# Patient Record
Sex: MALE | Race: WHITE | Employment: OTHER | URBAN - METROPOLITAN AREA
[De-identification: names, ages, dates, MRNs, and addresses within clinical notes are randomized per-mention and may not be internally consistent; named-entity substitution may affect disease eponyms.]

---

## 2018-03-12 ENCOUNTER — HOSPITAL ENCOUNTER (EMERGENCY)
Age: 68
Discharge: HOME | End: 2018-03-12
Payer: MEDICARE

## 2018-03-12 VITALS
SYSTOLIC BLOOD PRESSURE: 145 MMHG | OXYGEN SATURATION: 95 % | RESPIRATION RATE: 18 BRPM | HEART RATE: 82 BPM | BODY MASS INDEX: 32.08 KG/M2 | DIASTOLIC BLOOD PRESSURE: 88 MMHG | WEIGHT: 230 LBS | TEMPERATURE: 98.6 F

## 2018-03-12 DIAGNOSIS — R06.00 DYSPNEA, UNSPECIFIED TYPE: ICD-10-CM

## 2018-03-12 DIAGNOSIS — M79.89 LEG SWELLING: Primary | ICD-10-CM

## 2018-03-12 PROCEDURE — 99212 OFFICE O/P EST SF 10 MIN: CPT

## 2018-03-12 ASSESSMENT — PAIN SCALES - GENERAL: PAINLEVEL_OUTOF10: 8

## 2018-03-12 ASSESSMENT — PAIN DESCRIPTION - DESCRIPTORS: DESCRIPTORS: CONSTANT

## 2018-03-12 ASSESSMENT — PAIN DESCRIPTION - LOCATION: LOCATION: ABDOMEN

## 2018-03-12 NOTE — ED PROVIDER NOTES
reports that he does not use drugs. Family History: family history is not on file. The patients home medications have been reviewed. Allergies: Desyrel [trazodone]    -------------------------------------------------- RESULTS -------------------------------------------------  All laboratory and radiology results have been personally reviewed by myself   LABS:  No results found for this visit on 03/12/18. RADIOLOGY:  Interpreted by Radiologist.  No orders to display       ------------------------- NURSING NOTES AND VITALS REVIEWED ---------------------------   The nursing notes within the ED encounter and vital signs as below have been reviewed. BP (!) 145/88   Pulse 82   Temp 98.6 °F (37 °C) (Oral)   Resp 18   Wt 230 lb (104.3 kg)   SpO2 95%   BMI 32.08 kg/m²   Oxygen Saturation Interpretation: Normal      ---------------------------------------------------PHYSICAL EXAM--------------------------------------      Constitutional/General: Alert and oriented x3, well appearing, non toxic in NAD  Head: NC/AT  Eyes: PERRL, EOMI  Mouth: Oropharynx clear, handling secretions, no trismus  Neck: Supple, full ROM, no meningeal signs  Pulmonary: Lungs decreased with bilateral expiratory wheezing. No rales, or rhonchi. Not in respiratory distress  Cardiovascular:  Regular rate and rhythm, no murmurs, gallops, or rubs. 2+ distal pulses  Abdomen: Soft, + BS. No distension. Nontender. No palpable rigidity, rebound or guarding  Extremities: Moves all extremities x 4. Bilateral pitting edema. Warm and well perfused  Skin: warm and dry without rash  Neurologic: GCS 15,  Intact. No focal deficits  Psych: Normal Affect      ------------------------------ ED COURSE/MEDICAL DECISION MAKING----------------------  Medications - No data to display      Medical Decision Making:    Neurologic Luis Fernando pitting edema abdominal bloating and shortness of breath.  Patient was advised that he needed to go to the emergency

## 2018-04-25 ENCOUNTER — HOSPITAL ENCOUNTER (OUTPATIENT)
Age: 68
Setting detail: SPECIMEN
Discharge: HOME | End: 2018-04-25
Payer: MEDICARE

## 2018-04-25 PROCEDURE — 87045 FECES CULTURE AEROBIC BACT: CPT

## 2018-04-25 PROCEDURE — 89055 LEUKOCYTE ASSESSMENT FECAL: CPT

## 2018-04-25 PROCEDURE — G0328 FECAL BLOOD SCRN IMMUNOASSAY: HCPCS

## 2018-04-25 PROCEDURE — 82705 FATS/LIPIDS FECES QUAL: CPT

## 2018-04-25 PROCEDURE — 87329 GIARDIA AG IA: CPT

## 2018-04-26 LAB
GIARDIA ANTIGEN STOOL: NORMAL
OCCULT BLOOD SCREENING: NORMAL
WHITE BLOOD CELLS (WBC), STOOL: NORMAL

## 2018-04-27 LAB — CULTURE, STOOL: NORMAL

## 2018-04-29 LAB
FECAL NEUTRAL FAT: NORMAL
FECAL SPLIT FATS: ABNORMAL

## 2019-03-13 ENCOUNTER — ANESTHESIA EVENT (OUTPATIENT)
Dept: OPERATING ROOM | Age: 69
End: 2019-03-13
Payer: MEDICARE

## 2019-03-14 ENCOUNTER — ANESTHESIA (OUTPATIENT)
Dept: OPERATING ROOM | Age: 69
End: 2019-03-14
Payer: MEDICARE

## 2019-03-14 ENCOUNTER — HOSPITAL ENCOUNTER (OUTPATIENT)
Age: 69
Setting detail: OUTPATIENT SURGERY
Discharge: HOME | End: 2019-03-14
Attending: PODIATRIST | Admitting: PODIATRIST
Payer: MEDICARE

## 2019-03-14 VITALS
SYSTOLIC BLOOD PRESSURE: 92 MMHG | TEMPERATURE: 98.6 F | RESPIRATION RATE: 11 BRPM | DIASTOLIC BLOOD PRESSURE: 58 MMHG | OXYGEN SATURATION: 97 %

## 2019-03-14 VITALS
HEIGHT: 70 IN | DIASTOLIC BLOOD PRESSURE: 71 MMHG | WEIGHT: 228 LBS | TEMPERATURE: 98 F | RESPIRATION RATE: 16 BRPM | SYSTOLIC BLOOD PRESSURE: 137 MMHG | BODY MASS INDEX: 32.64 KG/M2 | OXYGEN SATURATION: 98 % | HEART RATE: 50 BPM

## 2019-03-14 DIAGNOSIS — M20.21 HALLUX RIGIDUS OF RIGHT FOOT: Primary | ICD-10-CM

## 2019-03-14 DIAGNOSIS — R52 PAIN: ICD-10-CM

## 2019-03-14 PROCEDURE — C1776 JOINT DEVICE (IMPLANTABLE): HCPCS | Performed by: PODIATRIST

## 2019-03-14 PROCEDURE — 2709999900 HC NON-CHARGEABLE SUPPLY: Performed by: PODIATRIST

## 2019-03-14 PROCEDURE — 2720000010 HC SURG SUPPLY STERILE: Performed by: PODIATRIST

## 2019-03-14 PROCEDURE — 3600000004 HC SURGERY LEVEL 4 BASE: Performed by: PODIATRIST

## 2019-03-14 PROCEDURE — 7100000011 HC PHASE II RECOVERY - ADDTL 15 MIN: Performed by: PODIATRIST

## 2019-03-14 PROCEDURE — 2500000003 HC RX 250 WO HCPCS: Performed by: NURSE ANESTHETIST, CERTIFIED REGISTERED

## 2019-03-14 PROCEDURE — 3600000014 HC SURGERY LEVEL 4 ADDTL 15MIN: Performed by: PODIATRIST

## 2019-03-14 PROCEDURE — 6360000002 HC RX W HCPCS: Performed by: PODIATRIST

## 2019-03-14 PROCEDURE — 3700000000 HC ANESTHESIA ATTENDED CARE: Performed by: PODIATRIST

## 2019-03-14 PROCEDURE — 2580000003 HC RX 258: Performed by: ANESTHESIOLOGY

## 2019-03-14 PROCEDURE — 2580000003 HC RX 258: Performed by: PODIATRIST

## 2019-03-14 PROCEDURE — 3700000001 HC ADD 15 MINUTES (ANESTHESIA): Performed by: PODIATRIST

## 2019-03-14 PROCEDURE — 2500000003 HC RX 250 WO HCPCS: Performed by: PODIATRIST

## 2019-03-14 PROCEDURE — 6360000002 HC RX W HCPCS: Performed by: NURSE ANESTHETIST, CERTIFIED REGISTERED

## 2019-03-14 PROCEDURE — 7100000010 HC PHASE II RECOVERY - FIRST 15 MIN: Performed by: PODIATRIST

## 2019-03-14 DEVICE — IMPLANTABLE DEVICE
Type: IMPLANTABLE DEVICE | Status: FUNCTIONAL
Brand: LPT

## 2019-03-14 RX ORDER — FENTANYL CITRATE 50 UG/ML
INJECTION, SOLUTION INTRAMUSCULAR; INTRAVENOUS PRN
Status: DISCONTINUED | OUTPATIENT
Start: 2019-03-14 | End: 2019-03-14 | Stop reason: SDUPTHER

## 2019-03-14 RX ORDER — OXYCODONE HYDROCHLORIDE AND ACETAMINOPHEN 5; 325 MG/1; MG/1
1 TABLET ORAL EVERY 4 HOURS PRN
Qty: 40 TABLET | Refills: 0 | Status: SHIPPED | OUTPATIENT
Start: 2019-03-14 | End: 2019-03-21

## 2019-03-14 RX ORDER — SODIUM CHLORIDE, SODIUM LACTATE, POTASSIUM CHLORIDE, CALCIUM CHLORIDE 600; 310; 30; 20 MG/100ML; MG/100ML; MG/100ML; MG/100ML
INJECTION, SOLUTION INTRAVENOUS CONTINUOUS
Status: DISCONTINUED | OUTPATIENT
Start: 2019-03-14 | End: 2019-03-14 | Stop reason: HOSPADM

## 2019-03-14 RX ORDER — PROPOFOL 10 MG/ML
INJECTION, EMULSION INTRAVENOUS CONTINUOUS PRN
Status: DISCONTINUED | OUTPATIENT
Start: 2019-03-14 | End: 2019-03-14 | Stop reason: SDUPTHER

## 2019-03-14 RX ORDER — MIDAZOLAM HYDROCHLORIDE 1 MG/ML
INJECTION INTRAMUSCULAR; INTRAVENOUS PRN
Status: DISCONTINUED | OUTPATIENT
Start: 2019-03-14 | End: 2019-03-14 | Stop reason: SDUPTHER

## 2019-03-14 RX ORDER — LIDOCAINE HYDROCHLORIDE 20 MG/ML
INJECTION, SOLUTION INFILTRATION; PERINEURAL PRN
Status: DISCONTINUED | OUTPATIENT
Start: 2019-03-14 | End: 2019-03-14 | Stop reason: SDUPTHER

## 2019-03-14 RX ADMIN — LIDOCAINE HYDROCHLORIDE 25 MG: 20 INJECTION, SOLUTION INFILTRATION; PERINEURAL at 11:19

## 2019-03-14 RX ADMIN — FENTANYL CITRATE 50 MCG: 50 INJECTION, SOLUTION INTRAMUSCULAR; INTRAVENOUS at 11:19

## 2019-03-14 RX ADMIN — PROPOFOL 125 MCG/KG/MIN: 10 INJECTION, EMULSION INTRAVENOUS at 11:19

## 2019-03-14 RX ADMIN — FENTANYL CITRATE 50 MCG: 50 INJECTION, SOLUTION INTRAMUSCULAR; INTRAVENOUS at 11:22

## 2019-03-14 RX ADMIN — CEFAZOLIN 2 G: 1 INJECTION, POWDER, FOR SOLUTION INTRAMUSCULAR; INTRAVENOUS at 11:12

## 2019-03-14 RX ADMIN — MIDAZOLAM 2 MG: 1 INJECTION INTRAMUSCULAR; INTRAVENOUS at 11:16

## 2019-03-14 RX ADMIN — SODIUM CHLORIDE, POTASSIUM CHLORIDE, SODIUM LACTATE AND CALCIUM CHLORIDE: 600; 310; 30; 20 INJECTION, SOLUTION INTRAVENOUS at 10:50

## 2019-03-14 ASSESSMENT — PULMONARY FUNCTION TESTS
PIF_VALUE: 0

## 2019-03-14 ASSESSMENT — PAIN SCALES - GENERAL
PAINLEVEL_OUTOF10: 0

## 2019-03-14 ASSESSMENT — PAIN - FUNCTIONAL ASSESSMENT: PAIN_FUNCTIONAL_ASSESSMENT: 0-10

## 2022-06-09 NOTE — PROGRESS NOTES
Name_______________________________________Printed:____________________  Date and time of surgery__6/29 0730______________________Arrival Time:_0600_______________   1. The instructions given regarding when and if a patient needs to stop oral intake prior to surgery varies. Follow the specific instructions you were given                  __x_Nothing to eat or to drink after Midnight the night before.                   ____Carbo loading or ERAS instructions will be given to select patients-if you have been given those instructions -please do the following                           The evening before your surgery after dinner before midnight drink 40 ounces of gatorade. If you are diabetic use sugar free. The morning of surgery drink 40 ounces of water. This needs to be finished 3 hours prior to your surgery start time. 2. Take the following pills with a small sip of water on the morning of surgery__ methadone prilosec_________________________________________________                  Do not take blood pressure medications ending in pril or sartan the faiza prior to surgery or the morning of surgery_   3. Aspirin, Ibuprofen, Advil, Naproxen, Vitamin E and other Anti-inflammatory products and supplements should be stopped for 5 -7days before surgery or as directed by your physician. 4. Check with your Doctor regarding stopping Plavix, Coumadin,Eliquis, Lovenox,Effient,Pradaxa,Xarelto, Fragmin or other blood thinners and follow their instructions. 5. Do not smoke, and do not drink any alcoholic beverages 24 hours prior to surgery. This includes NA Beer. Refrain from the usage of any recreational drugs. 6. You may brush your teeth and gargle the morning of surgery. DO NOT SWALLOW WATER   7. You MUST make arrangements for a responsible adult to stay on site while you are here and take you home after your surgery. You will not be allowed to leave alone or drive yourself home.   It is strongly suggested someone stay with you the first 24 hrs. Your surgery will be cancelled if you do not have a ride home. 8. A parent/legal guardian must accompany a child scheduled for surgery and plan to stay at the hospital until the child is discharged. Please do not bring other children with you. 9. Please wear simple, loose fitting clothing to the hospital.  Kimberley Stearns not bring valuables (money, credit cards, checkbooks, etc.) Do not wear any makeup (including no eye makeup) or nail polish on your fingers or toes. 10. DO NOT wear any jewelry or piercings on day of surgery. All body piercing jewelry must be removed. 11. If you have ___dentures, they will be removed before going to the OR; we will provide you a container. If you wear ___contact lenses or ___glasses, they will be removed; please bring a case for them. 12. Please see your family doctor/pediatrician for a history & physical and/or concerning medications. Bring any test results/reports from your physician's office. PCP__________________Phone___________H&P Appt. Date________             13 If you  have a Living Will and Durable Power of  for Healthcare, please bring in a copy. 15. Notify your Surgeon if you develop any illness between now and surgery  time, cough, cold, fever, sore throat, nausea, vomiting, etc.  Please notify your surgeon if you experience dizziness, shortness of breath or blurred vision between now & the time of your surgery             15. DO NOT shave your operative site 96 hours prior to surgery. For face & neck surgery, men may use an electric razor 48 hours prior to surgery. 16. Shower the night before or morning of surgery using an antibacterial soap or as you have been instructed. 17. To provide excellent care visitors will be limited to one in the room at any given time. 18.  Please bring picture ID and insurance card.              19.  Visit our web site for additional information:  [email or web address]              20.During flu season no children under the age of 15 are permitted in the hospital for the safety of all patients. 21. If you take a long acting insulin in the evening only  take half of your usual  dose the night  before your procedure              22. If you use a c-pap please bring DOS if staying overnight,             23.For your convenience [address] [place] [address] has a pharmacy on site to fill your prescriptions. 24. If you use oxygen and have a portable tank please bring it  with you the DOS             25. Bring a complete list of all your medications with name and dose include any supplements. 26. Other__________________________________________   *Please call pre admission testing if you any further questions   [address] Air  [phone]   [address] [place] [address]       VISITOR POLICY(subject to change)    Current policy is 2 visitors per patient. No children. A mask is required. Visiting hours are 8a-8p. Overnight visitors will be at the discretion of the nurse. All above information reviewed with patient in person or by phone. Patient verbalizes understanding. All questions and concerns addressed.                                                                                                  Patient/Rep____________________                                                                                                                   Per phone/pt                 PRE OP INSTRUCTIONS

## 2022-06-09 NOTE — PROGRESS NOTES
Patient called in- I started to go over pre-op instructions and get his medical history. Patient became increasingly agitated and belligerent and accusatory. I asked the patient to please calm down I was only trying to help him get ready for surgery He continued and I told him unless he could speak to me in a civil tone I was going to hang up and contact DR Degroot office.  I contacted Dr Alesha Redding office and let them know what had taken place -she states she will contact DR Castrejon and inform him-I asked her to call me back and let me know how to proceed

## 2022-06-09 NOTE — PROGRESS NOTES
Sent virtual class email to [email or web address]. Asked by Chris Ventura to call patient to answer questions about upcoming surgery. Called and left message to return call.       Left contact information    Doreen Klein  Orthopedic Nurse Navigator   [phone]

## 2022-06-14 NOTE — PROGRESS NOTES
Spoke with patient about upcoming surgery. Discussed virtual video requirement. Patient inquired about ecf placement after surgery. Patient lives in [facility]. Son lives nearby but has a multilevel home and is unable to stay with him. Patient has insurance coverage thru [facility] and [place]. Mr. Deepti Casey, reports no preference to staying in [place] or [facility] but would like to stay in a quality facility. List of local facilities covered by [facility] has been sent to patients email. [email or web address] My contact information given. Jakob Llamas  Orthopedic Nurse Navigator  [phone]  [email or web address]

## 2022-06-20 NOTE — PROGRESS NOTES
Patient sent link to watch Joint Education Class  Emailed joint education materials to patient about medication education, helpful tips, pre-surgical checklist, and pre-op showering instructions. Received an e-mail confirmation of patient viewing video. Pre-test and post-test done. Patient is aware that may contact me for any questions. My work email address and phone number given. Pre-test:5/5  Post-test:5/5    Sent patient answer key and explanation of correct answers. DOS: 6/20  Dr Ruiz Shafer Nurse Navigator  [phone]  [email or web address]

## 2022-06-28 ENCOUNTER — ANESTHESIA EVENT (OUTPATIENT)
Dept: OPERATING ROOM | Age: 72
DRG: 470 | End: 2022-06-28
Payer: MEDICARE

## 2022-06-28 NOTE — PROGRESS NOTES
Resent [facility] medicare insurance list to patient's email per patient request.  [email or web address]

## 2022-06-28 NOTE — PROGRESS NOTES
ORTHOPAEDIC NURSE NAVIGATOR SUMMARY NOTE    Surgery Details  Anticipated Date of Surgery: 7/29/2022  Surgery: right knee  Surgeon:  Daksha Honeycutt,  [place] Pre-Op Education: yes Virtual complete  If pt did not complete either, why not? N/A    PCP  PCP: No primary care provider on file. Phone #: None    Date of PCP Visit for H&P: 4/26 by Dr. Daksha Honeycutt  Any Noted Concerns from PCP prior to surgery:  no  If Yes, what concerns?:    Review of Past Medical History Reveals History of:    Patient Medical Hx:  Past Medical History:   Diagnosis Date    Anxiety     Depression     GERD (gastroesophageal reflux disease)     PTSD (post-traumatic stress disorder)      Patients Surgical Hx:   Past Surgical History:   Procedure Laterality Date    ARTHRODESIS Right 3/14/2019    RIGHT MPJ IMPLANT, RIGHT FOOT performed by Jen Mclain DPM at [address]      x5    KNEE SURGERY Bilateral     x4    OTHER SURGICAL HISTORY Right 03/14/2019    MPJ implant     ROTATOR CUFF REPAIR        Patients Social Hx:  Social History     Tobacco History     Smoking Status  Current Every Day Smoker Smoking Frequency  1 pack/day for 48 years (50 pk yrs) Smoking Tobacco Type  Cigarettes    Smokeless Tobacco Use  Never Used    Tobacco Comment  quit [id number]          Alcohol History     Alcohol Use Status  Yes Comment  social          Drug Use     Drug Use Status  No          Sexual Activity     Sexually Active  Not Asked              Alcohol use:     Alcohol Use:     Frequency of Alcohol Consumption: Not on file    Average Number of Drinks: Not on file    Frequency of Binge Drinking: Not on file     Home Medications:  Prior to Admission medications    Medication Sig Start Date End Date Taking?  Authorizing Provider   diclofenac sodium 1 % GEL Apply 4 g topically 2 times daily as needed     Historical Provider, MD   omeprazole (PRILOSEC) 20 MG delayed release capsule Take 20 mg by mouth Daily     Historical Provider, MD methadone (DOLOPHINE) 10 MG tablet Take 10 mg by mouth daily. Historical Provider, MD   vitamin D (CHOLECALCIFEROL) 1000 units TABS tablet Take 1,000 Units by mouth 3 times daily    Historical Provider, MD   ascorbic acid (VITAMIN C) 500 MG tablet Take 500 mg by mouth daily    Historical Provider, MD        Medication Contract and Consent for Opioid Use Documents Filed      No documents found               Pain Management Program:  yes    Lab Values:   Hgb/Hct:   Hemoglobin (g/dL)   Date Value   07/28/2017 14.0   /  Hematocrit (%)   Date Value   07/28/2017 42.6      HgbA1C:    No results found for: LABA1C   Albumin:    Lab Results   Component Value Date    LABALBU 4.2 07/28/2017      BUN/Cr:   BUN (mg/dL)   Date Value   07/28/2017 11   /  CREATININE (mg/dL)   Date Value   07/28/2017 0.7       Coronary Artery Disease/HTN/CHF History: No      Diabetes History: No   Most Recent HgbA1C:   No results found for: LABA1C     Pulmonary: COPD/emphysema/Asthma  Use of home oxygen: no    Tobacco Use      Smoking status: Current Every Day Smoker        Packs/day: 1.00        Years: 48.00        Pack years: 48        Types: Cigarettes      Smokeless tobacco: Never Used      Tobacco comment: quit [id number]    Referred for Smoking Cessation: No, quit 6 months ago  STOP-BANG SCREEN RISK FACTORS:Age > 48  Male      DVT Risk Stratification:  Unknown   History of Blood Clots:no      BMI Greater than 40 at time of scheduling?: No   BMI:    BMI Readings from Last 1 Encounters:   06/09/22 30.85 kg/m²       Pre-HAB  Prehab Ordered: NA  Attended Pre-Hab Program: NA      Discharge Disposition Information:   Patient insurance on file: Medicare, [facility], [facility]   Anticipated Discharge Disposition:  SNF    Who will be with patient at home following discharge? Son lives near by but unable to care for him. Pt agrees to SAME DAY SURGERY: No   L[facility], desires to go to SNF. No preference for [place] or [place] referrals.     Social Determinants of Health     Tobacco Use:     Smoking Tobacco Use: Not on file    Smokeless Tobacco Use: Not on file   Alcohol Use:     Frequency of Alcohol Consumption: Not on file    Average Number of Drinks: Not on file    Frequency of Binge Drinking: Not on file   Financial Resource Strain:     Difficulty of Paying Living Expenses: Not on file   Food Insecurity:     Worried About Running Out of Food in the Last Year: Not on file    [place]eto of Food in the Last Year: Not on file   Transportation Needs:     Lack of Transportation (Medical): Not on file    Lack of Transportation (Non-Medical): Not on file   Physical Activity:     Days of Exercise per Week: Not on file    Minutes of Exercise per Session: Not on file   Stress:     Feeling of Stress : Not on file   Social Connections:     Frequency of Communication with Friends and Family: Not on file    Frequency of Social Gatherings with Friends and Family: Not on file    Attends Alevism Services: Not on file    Active Member of [address] [place] [address] or Organizations: Not on file    Attends Club or Organization Meetings: Not on file    Marital Status: Not on file   Intimate Partner Violence:     Fear of Current or Ex-Partner: Not on file    Emotionally Abused: Not on file    Physically Abused: Not on file    Sexually Abused: Not on file   Depression:     PHQ-2 Score: Not on file   Housing Stability:     Unable to Pay for Housing in the Last Year: Not on file    Number of [place] in the Last Year: Not on file    Unstable Housing in the Last Year: Not on file          Zaria Mejia RN   6/28/2022  Orthopedic Nurse [address] [place] [address]  [phone]  [email or web address]

## 2022-06-29 ENCOUNTER — APPOINTMENT (OUTPATIENT)
Dept: GENERAL RADIOLOGY | Age: 72
DRG: 470 | End: 2022-06-29
Attending: ORTHOPAEDIC SURGERY
Payer: MEDICARE

## 2022-06-29 ENCOUNTER — ANESTHESIA (OUTPATIENT)
Dept: OPERATING ROOM | Age: 72
DRG: 470 | End: 2022-06-29
Payer: MEDICARE

## 2022-06-29 ENCOUNTER — HOSPITAL ENCOUNTER (INPATIENT)
Age: 72
LOS: 2 days | Discharge: NURSING, REHAB OR LONG-TERM CARE | DRG: 470 | End: 2022-07-01
Attending: ORTHOPAEDIC SURGERY | Admitting: ORTHOPAEDIC SURGERY
Payer: MEDICARE

## 2022-06-29 PROBLEM — M54.9 CHRONIC BACK PAIN: Status: ACTIVE | Noted: 2022-06-29

## 2022-06-29 PROBLEM — F43.10 POST TRAUMATIC STRESS DISORDER (PTSD): Status: ACTIVE | Noted: 2017-04-06

## 2022-06-29 PROBLEM — E66.3 OVERWEIGHT: Status: ACTIVE | Noted: 2022-06-29

## 2022-06-29 PROBLEM — D48.5 NEOPLASM OF UNCERTAIN BEHAVIOR OF SKIN: Status: ACTIVE | Noted: 2022-06-29

## 2022-06-29 PROBLEM — S90.129A CONTUSION OF TOE: Status: ACTIVE | Noted: 2022-06-29

## 2022-06-29 PROBLEM — R31.9 HEMATURIA SYNDROME: Status: ACTIVE | Noted: 2022-06-29

## 2022-06-29 PROBLEM — R49.0 DYSPHONIA: Status: ACTIVE | Noted: 2022-06-29

## 2022-06-29 PROBLEM — E55.9 VITAMIN D DEFICIENCY: Status: ACTIVE | Noted: 2022-06-29

## 2022-06-29 PROBLEM — R10.13 EPIGASTRIC PAIN: Status: ACTIVE | Noted: 2022-06-29

## 2022-06-29 PROBLEM — J44.9 CHRONIC OBSTRUCTIVE PULMONARY DISEASE (HCC): Status: ACTIVE | Noted: 2022-06-29

## 2022-06-29 PROBLEM — M25.519 ARTHRALGIA OF SHOULDER: Status: ACTIVE | Noted: 2022-06-29

## 2022-06-29 PROBLEM — R06.00 DYSPNEA, UNSPECIFIED: Status: ACTIVE | Noted: 2022-06-29

## 2022-06-29 PROBLEM — K02.7 DENTAL ROOT CARIES: Status: ACTIVE | Noted: 2022-06-29

## 2022-06-29 PROBLEM — M17.11 PRIMARY OSTEOARTHRITIS OF RIGHT KNEE: Status: ACTIVE | Noted: 2022-06-29

## 2022-06-29 PROBLEM — K59.03 THERAPEUTIC OPIOID INDUCED CONSTIPATION: Status: ACTIVE | Noted: 2022-06-29

## 2022-06-29 PROBLEM — T40.2X5A THERAPEUTIC OPIOID INDUCED CONSTIPATION: Status: ACTIVE | Noted: 2022-06-29

## 2022-06-29 PROBLEM — K02.53 DENTAL CARIES ON PIT AND FISSURE SURFACE PENETRATING INTO PULP: Status: ACTIVE | Noted: 2022-06-29

## 2022-06-29 PROBLEM — F11.20 OPIOID DEPENDENCE (HCC): Status: ACTIVE | Noted: 2022-06-29

## 2022-06-29 PROBLEM — F60.3 BORDERLINE PERSONALITY DISORDER (HCC): Status: ACTIVE | Noted: 2022-06-29

## 2022-06-29 PROBLEM — F17.200 NICOTINE DEPENDENCE: Status: ACTIVE | Noted: 2022-06-29

## 2022-06-29 PROBLEM — M17.10 PRIMARY LOCALIZED OSTEOARTHROSIS, LOWER LEG: Status: ACTIVE | Noted: 2022-06-29

## 2022-06-29 PROBLEM — R52 PAIN, UNSPECIFIED: Status: ACTIVE | Noted: 2022-06-29

## 2022-06-29 PROBLEM — F31.60 BIPOLAR AFFECTIVE DISORDER, CURRENT EPISODE MIXED (HCC): Status: ACTIVE | Noted: 2022-06-29

## 2022-06-29 PROBLEM — I25.9 CHRONIC ISCHEMIC HEART DISEASE: Status: ACTIVE | Noted: 2022-06-29

## 2022-06-29 PROBLEM — M62.81 MUSCLE WEAKNESS (GENERALIZED): Status: ACTIVE | Noted: 2022-06-29

## 2022-06-29 PROBLEM — M75.100 ROTATOR CUFF SYNDROME: Status: ACTIVE | Noted: 2022-06-29

## 2022-06-29 PROBLEM — K21.9 GASTROESOPHAGEAL REFLUX DISEASE: Status: ACTIVE | Noted: 2022-06-29

## 2022-06-29 PROBLEM — R45.851 SUICIDAL THOUGHTS: Status: ACTIVE | Noted: 2022-06-29

## 2022-06-29 PROBLEM — K58.9 IRRITABLE BOWEL SYNDROME: Status: ACTIVE | Noted: 2022-06-29

## 2022-06-29 PROBLEM — F10.11 NONDEPENDENT ALCOHOL ABUSE, IN REMISSION: Status: ACTIVE | Noted: 2022-06-29

## 2022-06-29 PROBLEM — F33.1 MODERATE EPISODE OF RECURRENT MAJOR DEPRESSIVE DISORDER (HCC): Status: ACTIVE | Noted: 2022-06-29

## 2022-06-29 PROBLEM — M54.50 LOW BACK PAIN: Status: ACTIVE | Noted: 2022-06-29

## 2022-06-29 PROBLEM — G89.29 CHRONIC BACK PAIN: Status: ACTIVE | Noted: 2022-06-29

## 2022-06-29 PROBLEM — M25.569 PAIN IN UNSPECIFIED KNEE: Status: ACTIVE | Noted: 2022-06-29

## 2022-06-29 PROBLEM — F33.9 RECURRENT MAJOR DEPRESSIVE DISORDER (HCC): Status: ACTIVE | Noted: 2022-06-29

## 2022-06-29 PROBLEM — M15.9 GENERALIZED OSTEOARTHRITIS: Status: ACTIVE | Noted: 2022-06-29

## 2022-06-29 PROBLEM — F17.200 TOBACCO USE DISORDER: Status: ACTIVE | Noted: 2022-06-29

## 2022-06-29 PROBLEM — M54.2 NECK PAIN: Status: ACTIVE | Noted: 2022-06-29

## 2022-06-29 PROBLEM — E78.5 OTHER AND UNSPECIFIED HYPERLIPIDEMIA: Status: ACTIVE | Noted: 2022-06-29

## 2022-06-29 PROBLEM — G56.01 CARPAL TUNNEL SYNDROME, RIGHT UPPER LIMB: Status: ACTIVE | Noted: 2022-06-29

## 2022-06-29 PROBLEM — Z59.00 HOMELESS SINGLE PERSON: Status: ACTIVE | Noted: 2022-06-29

## 2022-06-29 PROBLEM — L25.9 CONTACT DERMATITIS AND OTHER ECZEMA: Status: ACTIVE | Noted: 2022-06-29

## 2022-06-29 PROBLEM — K02.63 DENTAL CARIES ON SMOOTH SURFACE PENETRATING INTO PULP: Status: ACTIVE | Noted: 2022-06-29

## 2022-06-29 PROBLEM — S06.9XAA INTRACRANIAL INJURY OF OTHER AND UNSPECIFIED NATURE: Status: ACTIVE | Noted: 2022-06-29

## 2022-06-29 PROBLEM — H91.8X9 OTHER SPECIFIED FORMS OF HEARING LOSS: Status: ACTIVE | Noted: 2022-06-29

## 2022-06-29 PROBLEM — K80.20 CHOLELITHIASIS: Status: ACTIVE | Noted: 2022-06-29

## 2022-06-29 PROBLEM — F13.20 SEDATIVE HYPNOTIC OR ANXIOLYTIC DEPENDENCE (HCC): Status: ACTIVE | Noted: 2022-06-29

## 2022-06-29 PROBLEM — R94.31 PROLONGED QT INTERVAL: Status: ACTIVE | Noted: 2022-06-29

## 2022-06-29 PROBLEM — H90.3 SENSORINEURAL HEARING LOSS, BILATERAL: Status: ACTIVE | Noted: 2022-06-29

## 2022-06-29 PROBLEM — M17.0 PRIMARY OSTEOARTHRITIS OF BOTH KNEES: Status: ACTIVE | Noted: 2017-04-06

## 2022-06-29 PROBLEM — R69 OTHER ILL-DEFINED AND UNKNOWN CAUSES OF MORBIDITY AND MORTALITY: Status: ACTIVE | Noted: 2022-06-29

## 2022-06-29 PROBLEM — N20.0 CALCULUS OF KIDNEY: Status: ACTIVE | Noted: 2022-06-29

## 2022-06-29 PROBLEM — F43.10 POSTTRAUMATIC STRESS DISORDER: Status: ACTIVE | Noted: 2022-06-29

## 2022-06-29 PROBLEM — I25.10 ARTERIOSCLEROSIS OF CORONARY ARTERY: Status: ACTIVE | Noted: 2022-06-29

## 2022-06-29 PROBLEM — F41.8 DEPRESSION WITH ANXIETY: Status: ACTIVE | Noted: 2022-06-29

## 2022-06-29 PROBLEM — J18.9 PNEUMONIA, UNSPECIFIED ORGANISM: Status: ACTIVE | Noted: 2022-06-29

## 2022-06-29 LAB
ABO/RH: NORMAL
ABO/RH: NORMAL
ANTIBODY SCREEN: NORMAL
GLUCOSE BLD-MCNC: 103 MG/DL (ref 70–99)
GLUCOSE BLD-MCNC: 135 MG/DL (ref 70–99)
PERFORMED ON: ABNORMAL
PERFORMED ON: ABNORMAL

## 2022-06-29 PROCEDURE — 2500000003 HC RX 250 WO HCPCS: Performed by: NURSE ANESTHETIST, CERTIFIED REGISTERED

## 2022-06-29 PROCEDURE — 2500000003 HC RX 250 WO HCPCS: Performed by: ANESTHESIOLOGY

## 2022-06-29 PROCEDURE — 0SRC0J9 REPLACEMENT OF RIGHT KNEE JOINT WITH SYNTHETIC SUBSTITUTE, CEMENTED, OPEN APPROACH: ICD-10-PCS | Performed by: ORTHOPAEDIC SURGERY

## 2022-06-29 PROCEDURE — 6360000002 HC RX W HCPCS: Performed by: ORTHOPAEDIC SURGERY

## 2022-06-29 PROCEDURE — 2580000003 HC RX 258: Performed by: ORTHOPAEDIC SURGERY

## 2022-06-29 PROCEDURE — 97165 OT EVAL LOW COMPLEX 30 MIN: CPT

## 2022-06-29 PROCEDURE — 97161 PT EVAL LOW COMPLEX 20 MIN: CPT

## 2022-06-29 PROCEDURE — 76942 ECHO GUIDE FOR BIOPSY: CPT | Performed by: ANESTHESIOLOGY

## 2022-06-29 PROCEDURE — C1776 JOINT DEVICE (IMPLANTABLE): HCPCS | Performed by: ORTHOPAEDIC SURGERY

## 2022-06-29 PROCEDURE — 6370000000 HC RX 637 (ALT 250 FOR IP): Performed by: ORTHOPAEDIC SURGERY

## 2022-06-29 PROCEDURE — 6370000000 HC RX 637 (ALT 250 FOR IP): Performed by: ANESTHESIOLOGY

## 2022-06-29 PROCEDURE — 7100000000 HC PACU RECOVERY - FIRST 15 MIN: Performed by: ORTHOPAEDIC SURGERY

## 2022-06-29 PROCEDURE — 3700000001 HC ADD 15 MINUTES (ANESTHESIA): Performed by: ORTHOPAEDIC SURGERY

## 2022-06-29 PROCEDURE — 2720000010 HC SURG SUPPLY STERILE: Performed by: ORTHOPAEDIC SURGERY

## 2022-06-29 PROCEDURE — 3600000005 HC SURGERY LEVEL 5 BASE: Performed by: ORTHOPAEDIC SURGERY

## 2022-06-29 PROCEDURE — 86900 BLOOD TYPING SEROLOGIC ABO: CPT

## 2022-06-29 PROCEDURE — 73560 X-RAY EXAM OF KNEE 1 OR 2: CPT

## 2022-06-29 PROCEDURE — C1713 ANCHOR/SCREW BN/BN,TIS/BN: HCPCS | Performed by: ORTHOPAEDIC SURGERY

## 2022-06-29 PROCEDURE — 7100000001 HC PACU RECOVERY - ADDTL 15 MIN: Performed by: ORTHOPAEDIC SURGERY

## 2022-06-29 PROCEDURE — 8E0Y0CZ ROBOTIC ASSISTED PROCEDURE OF LOWER EXTREMITY, OPEN APPROACH: ICD-10-PCS | Performed by: ORTHOPAEDIC SURGERY

## 2022-06-29 PROCEDURE — 3700000000 HC ANESTHESIA ATTENDED CARE: Performed by: ORTHOPAEDIC SURGERY

## 2022-06-29 PROCEDURE — 6360000002 HC RX W HCPCS: Performed by: ANESTHESIOLOGY

## 2022-06-29 PROCEDURE — 2500000003 HC RX 250 WO HCPCS: Performed by: ORTHOPAEDIC SURGERY

## 2022-06-29 PROCEDURE — 94150 VITAL CAPACITY TEST: CPT

## 2022-06-29 PROCEDURE — 6360000002 HC RX W HCPCS: Performed by: NURSE ANESTHETIST, CERTIFIED REGISTERED

## 2022-06-29 PROCEDURE — 3600000015 HC SURGERY LEVEL 5 ADDTL 15MIN: Performed by: ORTHOPAEDIC SURGERY

## 2022-06-29 PROCEDURE — A4217 STERILE WATER/SALINE, 500 ML: HCPCS | Performed by: ORTHOPAEDIC SURGERY

## 2022-06-29 PROCEDURE — 2709999900 HC NON-CHARGEABLE SUPPLY: Performed by: ORTHOPAEDIC SURGERY

## 2022-06-29 PROCEDURE — 86850 RBC ANTIBODY SCREEN: CPT

## 2022-06-29 PROCEDURE — 97530 THERAPEUTIC ACTIVITIES: CPT

## 2022-06-29 PROCEDURE — 64447 NJX AA&/STRD FEMORAL NRV IMG: CPT | Performed by: ANESTHESIOLOGY

## 2022-06-29 PROCEDURE — 86901 BLOOD TYPING SEROLOGIC RH(D): CPT

## 2022-06-29 PROCEDURE — 1200000000 HC SEMI PRIVATE

## 2022-06-29 DEVICE — CEMENT BNE 40GM HI VISC RADPQ FOR REV SURG: Type: IMPLANTABLE DEVICE | Site: KNEE | Status: FUNCTIONAL

## 2022-06-29 DEVICE — JOURNEY II BCS FEMORAL OXINIUM                                    RIGHT SIZE 8
Type: IMPLANTABLE DEVICE | Site: KNEE | Status: FUNCTIONAL
Brand: JOURNEY

## 2022-06-29 DEVICE — JOURNEY TIBIAL BASEPLATE NONPOROUS                                    RIGHT SIZE 7
Type: IMPLANTABLE DEVICE | Site: KNEE | Status: FUNCTIONAL
Brand: JOURNEY

## 2022-06-29 DEVICE — JOURNEY II BCS XLPE ARTICULAR                                    INSERT SIZE 7-8 RIGHT 9MM
Type: IMPLANTABLE DEVICE | Site: KNEE | Status: FUNCTIONAL
Brand: JOURNEY

## 2022-06-29 DEVICE — JOURNEY BCS PATELLA  RESURFACING                                    ROUND 35 MM  STD
Type: IMPLANTABLE DEVICE | Site: PATELLA | Status: FUNCTIONAL
Brand: JOURNEY

## 2022-06-29 RX ORDER — SODIUM CHLORIDE 9 MG/ML
INJECTION, SOLUTION INTRAVENOUS PRN
Status: DISCONTINUED | OUTPATIENT
Start: 2022-06-29 | End: 2022-06-29

## 2022-06-29 RX ORDER — MEPERIDINE HYDROCHLORIDE 25 MG/ML
12.5 INJECTION INTRAMUSCULAR; INTRAVENOUS; SUBCUTANEOUS EVERY 5 MIN PRN
Status: DISCONTINUED | OUTPATIENT
Start: 2022-06-29 | End: 2022-07-01 | Stop reason: HOSPADM

## 2022-06-29 RX ORDER — SODIUM CHLORIDE, SODIUM LACTATE, POTASSIUM CHLORIDE, CALCIUM CHLORIDE 600; 310; 30; 20 MG/100ML; MG/100ML; MG/100ML; MG/100ML
INJECTION, SOLUTION INTRAVENOUS CONTINUOUS
Status: DISCONTINUED | OUTPATIENT
Start: 2022-06-29 | End: 2022-06-29

## 2022-06-29 RX ORDER — VITAMIN B COMPLEX
1000 TABLET ORAL 3 TIMES DAILY
Status: DISCONTINUED | OUTPATIENT
Start: 2022-06-29 | End: 2022-07-01 | Stop reason: HOSPADM

## 2022-06-29 RX ORDER — HYDROMORPHONE HCL 110MG/55ML
PATIENT CONTROLLED ANALGESIA SYRINGE INTRAVENOUS PRN
Status: DISCONTINUED | OUTPATIENT
Start: 2022-06-29 | End: 2022-06-29 | Stop reason: SDUPTHER

## 2022-06-29 RX ORDER — ACETAMINOPHEN 325 MG/1
650 TABLET ORAL EVERY 6 HOURS SCHEDULED
Status: DISCONTINUED | OUTPATIENT
Start: 2022-06-29 | End: 2022-07-01 | Stop reason: HOSPADM

## 2022-06-29 RX ORDER — BUPIVACAINE HYDROCHLORIDE AND EPINEPHRINE 2.5; 5 MG/ML; UG/ML
INJECTION, SOLUTION EPIDURAL; INFILTRATION; INTRACAUDAL; PERINEURAL
Status: COMPLETED | OUTPATIENT
Start: 2022-06-29 | End: 2022-06-29

## 2022-06-29 RX ORDER — DEXMEDETOMIDINE HYDROCHLORIDE 100 UG/ML
INJECTION, SOLUTION INTRAVENOUS PRN
Status: DISCONTINUED | OUTPATIENT
Start: 2022-06-29 | End: 2022-06-29 | Stop reason: SDUPTHER

## 2022-06-29 RX ORDER — BUPIVACAINE HYDROCHLORIDE 2.5 MG/ML
INJECTION, SOLUTION INFILTRATION; PERINEURAL
Status: COMPLETED | OUTPATIENT
Start: 2022-06-29 | End: 2022-06-29

## 2022-06-29 RX ORDER — ONDANSETRON 2 MG/ML
4 INJECTION INTRAMUSCULAR; INTRAVENOUS
Status: ACTIVE | OUTPATIENT
Start: 2022-06-29 | End: 2022-06-29

## 2022-06-29 RX ORDER — LIDOCAINE HYDROCHLORIDE 20 MG/ML
INJECTION, SOLUTION EPIDURAL; INFILTRATION; INTRACAUDAL; PERINEURAL PRN
Status: DISCONTINUED | OUTPATIENT
Start: 2022-06-29 | End: 2022-06-29 | Stop reason: SDUPTHER

## 2022-06-29 RX ORDER — KETAMINE HCL IN NACL, ISO-OSM 100MG/10ML
SYRINGE (ML) INJECTION PRN
Status: DISCONTINUED | OUTPATIENT
Start: 2022-06-29 | End: 2022-06-29 | Stop reason: SDUPTHER

## 2022-06-29 RX ORDER — SODIUM PHOSPHATE, DIBASIC AND SODIUM PHOSPHATE, MONOBASIC 7; 19 G/133ML; G/133ML
1 ENEMA RECTAL DAILY PRN
Status: DISCONTINUED | OUTPATIENT
Start: 2022-06-29 | End: 2022-07-01 | Stop reason: HOSPADM

## 2022-06-29 RX ORDER — SUCCINYLCHOLINE CHLORIDE 20 MG/ML
INJECTION INTRAMUSCULAR; INTRAVENOUS PRN
Status: DISCONTINUED | OUTPATIENT
Start: 2022-06-29 | End: 2022-06-29 | Stop reason: SDUPTHER

## 2022-06-29 RX ORDER — SODIUM CHLORIDE 0.9 % (FLUSH) 0.9 %
5-40 SYRINGE (ML) INJECTION EVERY 12 HOURS SCHEDULED
Status: DISCONTINUED | OUTPATIENT
Start: 2022-06-29 | End: 2022-07-01 | Stop reason: HOSPADM

## 2022-06-29 RX ORDER — ONDANSETRON 2 MG/ML
4 INJECTION INTRAMUSCULAR; INTRAVENOUS EVERY 6 HOURS PRN
Status: DISCONTINUED | OUTPATIENT
Start: 2022-06-29 | End: 2022-07-01 | Stop reason: HOSPADM

## 2022-06-29 RX ORDER — OMEPRAZOLE 10 MG/1
20 CAPSULE, DELAYED RELEASE ORAL DAILY
Status: DISCONTINUED | OUTPATIENT
Start: 2022-06-29 | End: 2022-06-29 | Stop reason: CLARIF

## 2022-06-29 RX ORDER — HYDROMORPHONE HYDROCHLORIDE 1 MG/ML
0.5 INJECTION, SOLUTION INTRAMUSCULAR; INTRAVENOUS; SUBCUTANEOUS
Status: DISCONTINUED | OUTPATIENT
Start: 2022-06-29 | End: 2022-07-01 | Stop reason: HOSPADM

## 2022-06-29 RX ORDER — HYDRALAZINE HYDROCHLORIDE 20 MG/ML
10 INJECTION INTRAMUSCULAR; INTRAVENOUS
Status: DISCONTINUED | OUTPATIENT
Start: 2022-06-29 | End: 2022-07-01 | Stop reason: HOSPADM

## 2022-06-29 RX ORDER — SODIUM CHLORIDE 0.9 % (FLUSH) 0.9 %
5-40 SYRINGE (ML) INJECTION EVERY 12 HOURS SCHEDULED
Status: DISCONTINUED | OUTPATIENT
Start: 2022-06-29 | End: 2022-06-29

## 2022-06-29 RX ORDER — SODIUM CHLORIDE 0.9 % (FLUSH) 0.9 %
5-40 SYRINGE (ML) INJECTION PRN
Status: DISCONTINUED | OUTPATIENT
Start: 2022-06-29 | End: 2022-06-29

## 2022-06-29 RX ORDER — LIDOCAINE HYDROCHLORIDE 10 MG/ML
1 INJECTION, SOLUTION EPIDURAL; INFILTRATION; INTRACAUDAL; PERINEURAL
Status: DISCONTINUED | OUTPATIENT
Start: 2022-06-29 | End: 2022-06-29

## 2022-06-29 RX ORDER — HYDROMORPHONE HYDROCHLORIDE 2 MG/1
1 TABLET ORAL EVERY 4 HOURS PRN
Status: DISCONTINUED | OUTPATIENT
Start: 2022-06-29 | End: 2022-07-01 | Stop reason: HOSPADM

## 2022-06-29 RX ORDER — HYDROMORPHONE HYDROCHLORIDE 1 MG/ML
0.25 INJECTION, SOLUTION INTRAMUSCULAR; INTRAVENOUS; SUBCUTANEOUS
Status: DISCONTINUED | OUTPATIENT
Start: 2022-06-29 | End: 2022-07-01 | Stop reason: HOSPADM

## 2022-06-29 RX ORDER — DEXAMETHASONE SODIUM PHOSPHATE 4 MG/ML
INJECTION, SOLUTION INTRA-ARTICULAR; INTRALESIONAL; INTRAMUSCULAR; INTRAVENOUS; SOFT TISSUE PRN
Status: DISCONTINUED | OUTPATIENT
Start: 2022-06-29 | End: 2022-06-29 | Stop reason: SDUPTHER

## 2022-06-29 RX ORDER — CELECOXIB 200 MG/1
400 CAPSULE ORAL ONCE
Status: COMPLETED | OUTPATIENT
Start: 2022-06-29 | End: 2022-06-29

## 2022-06-29 RX ORDER — ROCURONIUM BROMIDE 10 MG/ML
INJECTION, SOLUTION INTRAVENOUS PRN
Status: DISCONTINUED | OUTPATIENT
Start: 2022-06-29 | End: 2022-06-29 | Stop reason: SDUPTHER

## 2022-06-29 RX ORDER — MAGNESIUM SULFATE HEPTAHYDRATE 500 MG/ML
INJECTION, SOLUTION INTRAMUSCULAR; INTRAVENOUS PRN
Status: DISCONTINUED | OUTPATIENT
Start: 2022-06-29 | End: 2022-06-29 | Stop reason: SDUPTHER

## 2022-06-29 RX ORDER — LIDOCAINE HYDROCHLORIDE 10 MG/ML
0.5 INJECTION, SOLUTION EPIDURAL; INFILTRATION; INTRACAUDAL; PERINEURAL ONCE
Status: DISCONTINUED | OUTPATIENT
Start: 2022-06-29 | End: 2022-06-29

## 2022-06-29 RX ORDER — BUPIVACAINE HYDROCHLORIDE 5 MG/ML
INJECTION, SOLUTION EPIDURAL; INTRACAUDAL
Status: COMPLETED | OUTPATIENT
Start: 2022-06-29 | End: 2022-06-29

## 2022-06-29 RX ORDER — FENTANYL CITRATE 50 UG/ML
INJECTION, SOLUTION INTRAMUSCULAR; INTRAVENOUS PRN
Status: DISCONTINUED | OUTPATIENT
Start: 2022-06-29 | End: 2022-06-29 | Stop reason: SDUPTHER

## 2022-06-29 RX ORDER — EPHEDRINE SULFATE/0.9% NACL/PF 50 MG/5 ML
SYRINGE (ML) INTRAVENOUS PRN
Status: DISCONTINUED | OUTPATIENT
Start: 2022-06-29 | End: 2022-06-29 | Stop reason: SDUPTHER

## 2022-06-29 RX ORDER — HYDROMORPHONE HYDROCHLORIDE 2 MG/1
2 TABLET ORAL EVERY 4 HOURS PRN
Status: DISCONTINUED | OUTPATIENT
Start: 2022-06-29 | End: 2022-07-01 | Stop reason: HOSPADM

## 2022-06-29 RX ORDER — MIDAZOLAM HYDROCHLORIDE 1 MG/ML
INJECTION INTRAMUSCULAR; INTRAVENOUS PRN
Status: DISCONTINUED | OUTPATIENT
Start: 2022-06-29 | End: 2022-06-29 | Stop reason: SDUPTHER

## 2022-06-29 RX ORDER — PANTOPRAZOLE SODIUM 20 MG/1
20 TABLET, DELAYED RELEASE ORAL DAILY
COMMUNITY

## 2022-06-29 RX ORDER — PROPOFOL 10 MG/ML
INJECTION, EMULSION INTRAVENOUS PRN
Status: DISCONTINUED | OUTPATIENT
Start: 2022-06-29 | End: 2022-06-29 | Stop reason: SDUPTHER

## 2022-06-29 RX ORDER — METHADONE HYDROCHLORIDE 10 MG/1
20 TABLET ORAL DAILY
Status: DISCONTINUED | OUTPATIENT
Start: 2022-06-29 | End: 2022-07-01 | Stop reason: HOSPADM

## 2022-06-29 RX ORDER — ENOXAPARIN SODIUM 100 MG/ML
40 INJECTION SUBCUTANEOUS DAILY
Status: DISCONTINUED | OUTPATIENT
Start: 2022-06-30 | End: 2022-07-01 | Stop reason: HOSPADM

## 2022-06-29 RX ORDER — MELOXICAM 7.5 MG/1
3.75 TABLET ORAL DAILY
Status: DISCONTINUED | OUTPATIENT
Start: 2022-06-29 | End: 2022-06-30

## 2022-06-29 RX ORDER — SODIUM CHLORIDE 0.9 % (FLUSH) 0.9 %
5-40 SYRINGE (ML) INJECTION PRN
Status: DISCONTINUED | OUTPATIENT
Start: 2022-06-29 | End: 2022-07-01 | Stop reason: HOSPADM

## 2022-06-29 RX ORDER — HYDROMORPHONE HCL 110MG/55ML
0.5 PATIENT CONTROLLED ANALGESIA SYRINGE INTRAVENOUS EVERY 5 MIN PRN
Status: DISCONTINUED | OUTPATIENT
Start: 2022-06-29 | End: 2022-07-01 | Stop reason: HOSPADM

## 2022-06-29 RX ORDER — CELECOXIB 200 MG/1
200 CAPSULE ORAL ONCE
Status: DISCONTINUED | OUTPATIENT
Start: 2022-06-29 | End: 2022-06-29

## 2022-06-29 RX ORDER — ONDANSETRON 4 MG/1
4 TABLET, ORALLY DISINTEGRATING ORAL EVERY 8 HOURS PRN
Status: DISCONTINUED | OUTPATIENT
Start: 2022-06-29 | End: 2022-07-01 | Stop reason: HOSPADM

## 2022-06-29 RX ORDER — ACETAMINOPHEN 325 MG/1
650 TABLET ORAL ONCE
Status: COMPLETED | OUTPATIENT
Start: 2022-06-29 | End: 2022-06-29

## 2022-06-29 RX ORDER — SODIUM CHLORIDE 9 MG/ML
INJECTION, SOLUTION INTRAVENOUS PRN
Status: DISCONTINUED | OUTPATIENT
Start: 2022-06-29 | End: 2022-07-01 | Stop reason: HOSPADM

## 2022-06-29 RX ORDER — ONDANSETRON 2 MG/ML
INJECTION INTRAMUSCULAR; INTRAVENOUS PRN
Status: DISCONTINUED | OUTPATIENT
Start: 2022-06-29 | End: 2022-06-29 | Stop reason: SDUPTHER

## 2022-06-29 RX ORDER — PANTOPRAZOLE SODIUM 40 MG/1
40 TABLET, DELAYED RELEASE ORAL
Status: DISCONTINUED | OUTPATIENT
Start: 2022-06-30 | End: 2022-07-01 | Stop reason: HOSPADM

## 2022-06-29 RX ORDER — LABETALOL HYDROCHLORIDE 5 MG/ML
10 INJECTION, SOLUTION INTRAVENOUS
Status: DISCONTINUED | OUTPATIENT
Start: 2022-06-29 | End: 2022-07-01 | Stop reason: HOSPADM

## 2022-06-29 RX ORDER — ASCORBIC ACID 500 MG
500 TABLET ORAL DAILY
Status: DISCONTINUED | OUTPATIENT
Start: 2022-06-30 | End: 2022-07-01 | Stop reason: HOSPADM

## 2022-06-29 RX ORDER — HYDROXYZINE HYDROCHLORIDE 10 MG/1
10 TABLET, FILM COATED ORAL EVERY 8 HOURS PRN
Status: DISCONTINUED | OUTPATIENT
Start: 2022-06-29 | End: 2022-07-01 | Stop reason: HOSPADM

## 2022-06-29 RX ORDER — OXYCODONE HYDROCHLORIDE 5 MG/1
5 TABLET ORAL
Status: COMPLETED | OUTPATIENT
Start: 2022-06-29 | End: 2022-06-29

## 2022-06-29 RX ORDER — SODIUM CHLORIDE 9 MG/ML
INJECTION, SOLUTION INTRAVENOUS CONTINUOUS
Status: DISCONTINUED | OUTPATIENT
Start: 2022-06-29 | End: 2022-06-30

## 2022-06-29 RX ADMIN — SODIUM CHLORIDE: 9 INJECTION, SOLUTION INTRAVENOUS at 13:04

## 2022-06-29 RX ADMIN — HYDROMORPHONE HYDROCHLORIDE 0.2 MG: 2 INJECTION, SOLUTION INTRAMUSCULAR; INTRAVENOUS; SUBCUTANEOUS at 09:31

## 2022-06-29 RX ADMIN — TRANEXAMIC ACID 1000 MG: 1 INJECTION, SOLUTION INTRAVENOUS at 07:15

## 2022-06-29 RX ADMIN — Medication 10 MG: at 07:54

## 2022-06-29 RX ADMIN — DEXAMETHASONE SODIUM PHOSPHATE 10 MG: 4 INJECTION, SOLUTION INTRAMUSCULAR; INTRAVENOUS at 07:41

## 2022-06-29 RX ADMIN — ACETAMINOPHEN 650 MG: 325 TABLET ORAL at 17:26

## 2022-06-29 RX ADMIN — FENTANYL CITRATE 50 MCG: 50 INJECTION, SOLUTION INTRAMUSCULAR; INTRAVENOUS at 07:34

## 2022-06-29 RX ADMIN — DEXMEDETOMIDINE HYDROCHLORIDE 5 MCG: 100 INJECTION, SOLUTION INTRAVENOUS at 10:02

## 2022-06-29 RX ADMIN — SODIUM CHLORIDE: 9 INJECTION, SOLUTION INTRAVENOUS at 21:09

## 2022-06-29 RX ADMIN — OXYCODONE 5 MG: 5 TABLET ORAL at 11:09

## 2022-06-29 RX ADMIN — HYDROMORPHONE HYDROCHLORIDE 0.5 MG: 2 INJECTION, SOLUTION INTRAMUSCULAR; INTRAVENOUS; SUBCUTANEOUS at 10:52

## 2022-06-29 RX ADMIN — ROCURONIUM BROMIDE 10 MG: 10 INJECTION, SOLUTION INTRAVENOUS at 07:37

## 2022-06-29 RX ADMIN — CEFAZOLIN 2000 MG: 2 INJECTION, POWDER, FOR SOLUTION INTRAMUSCULAR; INTRAVENOUS at 23:09

## 2022-06-29 RX ADMIN — PROPOFOL 200 MG: 10 INJECTION, EMULSION INTRAVENOUS at 07:37

## 2022-06-29 RX ADMIN — ROCURONIUM BROMIDE 40 MG: 10 INJECTION, SOLUTION INTRAVENOUS at 07:54

## 2022-06-29 RX ADMIN — Medication 10 MG: at 08:47

## 2022-06-29 RX ADMIN — SUGAMMADEX 200 MG: 100 INJECTION, SOLUTION INTRAVENOUS at 10:04

## 2022-06-29 RX ADMIN — SODIUM CHLORIDE, POTASSIUM CHLORIDE, SODIUM LACTATE AND CALCIUM CHLORIDE: 600; 310; 30; 20 INJECTION, SOLUTION INTRAVENOUS at 07:11

## 2022-06-29 RX ADMIN — HYDROMORPHONE HYDROCHLORIDE 0.5 MG: 2 INJECTION, SOLUTION INTRAMUSCULAR; INTRAVENOUS; SUBCUTANEOUS at 17:25

## 2022-06-29 RX ADMIN — ACETAMINOPHEN 650 MG: 325 TABLET ORAL at 12:49

## 2022-06-29 RX ADMIN — FENTANYL CITRATE 50 MCG: 50 INJECTION, SOLUTION INTRAMUSCULAR; INTRAVENOUS at 09:01

## 2022-06-29 RX ADMIN — MAGNESIUM SULFATE HEPTAHYDRATE 1 G: 500 INJECTION, SOLUTION INTRAMUSCULAR; INTRAVENOUS at 07:34

## 2022-06-29 RX ADMIN — Medication 1000 UNITS: at 20:48

## 2022-06-29 RX ADMIN — LIDOCAINE HYDROCHLORIDE 100 MG: 20 INJECTION, SOLUTION EPIDURAL; INFILTRATION; INTRACAUDAL; PERINEURAL at 07:36

## 2022-06-29 RX ADMIN — DEXMEDETOMIDINE HYDROCHLORIDE 5 MCG: 100 INJECTION, SOLUTION INTRAVENOUS at 08:14

## 2022-06-29 RX ADMIN — Medication 1000 UNITS: at 12:49

## 2022-06-29 RX ADMIN — Medication 10 MG: at 07:48

## 2022-06-29 RX ADMIN — ONDANSETRON 4 MG: 2 INJECTION INTRAMUSCULAR; INTRAVENOUS at 07:41

## 2022-06-29 RX ADMIN — BISACODYL 5 MG: 5 TABLET, COATED ORAL at 12:49

## 2022-06-29 RX ADMIN — CEFAZOLIN 2000 MG: 2 INJECTION, POWDER, FOR SOLUTION INTRAMUSCULAR; INTRAVENOUS at 14:27

## 2022-06-29 RX ADMIN — MELOXICAM 3.75 MG: 7.5 TABLET ORAL at 12:49

## 2022-06-29 RX ADMIN — HYDROMORPHONE HYDROCHLORIDE 0.5 MG: 2 INJECTION, SOLUTION INTRAMUSCULAR; INTRAVENOUS; SUBCUTANEOUS at 10:59

## 2022-06-29 RX ADMIN — Medication 30 MG: at 07:46

## 2022-06-29 RX ADMIN — DEXMEDETOMIDINE HYDROCHLORIDE 5 MCG: 100 INJECTION, SOLUTION INTRAVENOUS at 09:09

## 2022-06-29 RX ADMIN — METHADONE HYDROCHLORIDE 20 MG: 10 TABLET ORAL at 16:31

## 2022-06-29 RX ADMIN — Medication 10 MG: at 09:44

## 2022-06-29 RX ADMIN — BUPIVACAINE HYDROCHLORIDE 20 ML: 5 INJECTION, SOLUTION EPIDURAL; INTRACAUDAL at 07:07

## 2022-06-29 RX ADMIN — MIDAZOLAM 2 MG: 1 INJECTION INTRAMUSCULAR; INTRAVENOUS at 07:31

## 2022-06-29 RX ADMIN — HYDROMORPHONE HYDROCHLORIDE 0.2 MG: 2 INJECTION, SOLUTION INTRAMUSCULAR; INTRAVENOUS; SUBCUTANEOUS at 09:37

## 2022-06-29 RX ADMIN — TRANEXAMIC ACID 1000 MG: 1 INJECTION, SOLUTION INTRAVENOUS at 09:49

## 2022-06-29 RX ADMIN — SUCCINYLCHOLINE CHLORIDE 140 MG: 20 INJECTION, SOLUTION INTRAMUSCULAR; INTRAVENOUS at 07:37

## 2022-06-29 RX ADMIN — CEFAZOLIN 2000 MG: 2 INJECTION, POWDER, FOR SOLUTION INTRAMUSCULAR; INTRAVENOUS at 07:29

## 2022-06-29 RX ADMIN — ACETAMINOPHEN 650 MG: 325 TABLET ORAL at 06:54

## 2022-06-29 RX ADMIN — BUPIVACAINE HYDROCHLORIDE 20 ML: 2.5 INJECTION, SOLUTION INFILTRATION; PERINEURAL at 07:04

## 2022-06-29 RX ADMIN — HYDROMORPHONE HYDROCHLORIDE 2 MG: 2 TABLET ORAL at 21:08

## 2022-06-29 RX ADMIN — CELECOXIB 400 MG: 200 CAPSULE ORAL at 06:54

## 2022-06-29 ASSESSMENT — PAIN SCALES - WONG BAKER
WONGBAKER_NUMERICALRESPONSE: 0
WONGBAKER_NUMERICALRESPONSE: 0

## 2022-06-29 ASSESSMENT — PAIN - FUNCTIONAL ASSESSMENT
PAIN_FUNCTIONAL_ASSESSMENT: ACTIVITIES ARE NOT PREVENTED
PAIN_FUNCTIONAL_ASSESSMENT: 0-10

## 2022-06-29 ASSESSMENT — PAIN SCALES - GENERAL
PAINLEVEL_OUTOF10: 7
PAINLEVEL_OUTOF10: 5
PAINLEVEL_OUTOF10: 10
PAINLEVEL_OUTOF10: 8
PAINLEVEL_OUTOF10: 7
PAINLEVEL_OUTOF10: 10
PAINLEVEL_OUTOF10: 8
PAINLEVEL_OUTOF10: 7

## 2022-06-29 ASSESSMENT — PAIN DESCRIPTION - LOCATION
LOCATION: KNEE
LOCATION: KNEE
LOCATION: LEG
LOCATION: KNEE

## 2022-06-29 ASSESSMENT — PAIN DESCRIPTION - ORIENTATION
ORIENTATION: RIGHT

## 2022-06-29 ASSESSMENT — PAIN DESCRIPTION - PAIN TYPE: TYPE: SURGICAL PAIN

## 2022-06-29 ASSESSMENT — PAIN DESCRIPTION - DESCRIPTORS
DESCRIPTORS: PATIENT UNABLE TO DESCRIBE
DESCRIPTORS: ACHING
DESCRIPTORS: ACHING
DESCRIPTORS: PATIENT UNABLE TO DESCRIBE
DESCRIPTORS: ACHING
DESCRIPTORS: PATIENT UNABLE TO DESCRIBE

## 2022-06-29 ASSESSMENT — PAIN DESCRIPTION - ONSET: ONSET: ON-GOING

## 2022-06-29 ASSESSMENT — LIFESTYLE VARIABLES: SMOKING_STATUS: 1

## 2022-06-29 ASSESSMENT — PAIN DESCRIPTION - FREQUENCY: FREQUENCY: CONTINUOUS

## 2022-06-29 NOTE — ANESTHESIA PRE PROCEDURE
Department of Anesthesiology  Preprocedure Note       Name:  Weston Mustafa   Age:  67 y.o.  :  1950                                          MRN:  [id number]         Date:  2022      Surgeon: Summer Sherman):  Karthikeyan Frankel MD    Procedure: Procedure(s):  RIGHT ROBOTIC ASSISTED TOTAL KNEE ARTHROPLASTY -SMITH & NEPHEW STANDARD/ADVANCED    Medications prior to admission:   Prior to Admission medications    Medication Sig Start Date End Date Taking? Authorizing Provider   diclofenac sodium 1 % GEL Apply 4 g topically 2 times daily as needed     Historical Provider, MD   omeprazole (PRILOSEC) 20 MG delayed release capsule Take 20 mg by mouth Daily     Historical Provider, MD   methadone (DOLOPHINE) 10 MG tablet Take 10 mg by mouth daily.      Historical Provider, MD   vitamin D (CHOLECALCIFEROL) 1000 units TABS tablet Take 1,000 Units by mouth 3 times daily    Historical Provider, MD   ascorbic acid (VITAMIN C) 500 MG tablet Take 500 mg by mouth daily    Historical Provider, MD       Current medications:    Current Facility-Administered Medications   Medication Dose Route Frequency Provider Last Rate Last Admin    lactated ringers infusion   IntraVENous Continuous Karthikeyan Frankel MD        lidocaine PF 1 % injection 0.5 mL  0.5 mL IntraDERmal Once Karthikeyan Frankel MD        ceFAZolin (ANCEF) 2,000 mg in dextrose 5 % 50 mL IVPB (mini-bag)  2,000 mg IntraVENous On Call to [facility] [address], MD        celecoxib (CELEBREX) capsule 400 mg  400 mg Oral Once Karthikeyan Frankel MD        ortho mix injection   Injection On Call Karthikeyan Frankel MD        tranexamic acid (CYKLOKAPRON) 1,000 mg in sodium chloride 0.9 % 60 mL IVPB  1,000 mg IntraVENous On Call to [address] [place] [address] [address], MD        tranexamic acid (CYKLOKAPRON) 1,000 mg in sodium chloride 0.9 % 60 mL IVPB  1,000 mg IntraVENous Once Karthikeyan Frankel MD        celecoxib (CELEBREX) capsule 200 mg  200 mg Oral Once Lashawn Bush MD        acetaminophen (TYLENOL) tablet 650 mg  650 mg Oral Once Lashawn Bush MD           Allergies:  No Known Allergies    Problem List:    Patient Active Problem List   Diagnosis Code    Hallux rigidus of right foot M20.21       Past Medical History:        Diagnosis Date    Anxiety     Depression     GERD (gastroesophageal reflux disease)     PTSD (post-traumatic stress disorder)        Past Surgical History:        Procedure Laterality Date    ARTHRODESIS Right 3/14/2019    RIGHT MPJ IMPLANT, RIGHT FOOT performed by Dhaval Us DPM at [address]      x5    KNEE SURGERY Bilateral     x4    OTHER SURGICAL HISTORY Right 03/14/2019    MPJ implant     ROTATOR CUFF REPAIR         Social History:    Social History     Tobacco Use    Smoking status: Current Every Day Smoker     Packs/day: 1.00     Years: 48.00     Pack years: 48.00     Types: Cigarettes    Smokeless tobacco: Never Used    Tobacco comment: quit [id number]   Substance Use Topics    Alcohol use: Yes     Comment: social                                Ready to quit: Not Answered  Counseling given: Not Answered  Comment: quit [id number]      Vital Signs (Current):   Vitals:    06/09/22 1250   Weight: 215 lb (97.5 kg)   Height: 5' 10\" (1.778 m)                                              BP Readings from Last 3 Encounters:   03/14/19 (!) 92/58   03/14/19 137/71   03/12/18 (!) 145/88       NPO Status: Time of last liquid consumption: 2200                        Time of last solid consumption: 2200                        Date of last liquid consumption: 06/28/22                        Date of last solid food consumption: 06/28/22    BMI:   Wt Readings from Last 3 Encounters:   06/09/22 215 lb (97.5 kg)   03/14/19 228 lb (103.4 kg)   03/12/18 230 lb (104.3 kg)     Body mass index is 30.85 kg/m².     CBC:   Lab Results   Component Value Date    WBC 7.0 07/28/2017    RBC 4.66 07/28/2017    HGB 14.0 07/28/2017    HCT 42.6 07/28/2017    MCV 91.4 07/28/2017    RDW 15.3 07/28/2017     07/28/2017       CMP:   Lab Results   Component Value Date     07/28/2017    K 4.2 07/28/2017     07/28/2017    CO2 21 07/28/2017    BUN 11 07/28/2017    CREATININE 0.7 07/28/2017    GFRAA >60 07/28/2017    LABGLOM >60 07/28/2017    GLUCOSE 99 07/28/2017    PROT 7.1 07/28/2017    CALCIUM 9.0 07/28/2017    BILITOT 0.6 07/28/2017    ALKPHOS 100 07/28/2017    AST 21 07/28/2017    ALT 23 07/28/2017       POC Tests: No results for input(s): POCGLU, POCNA, POCK, POCCL, POCBUN, POCHEMO, POCHCT in the last 72 hours.     Coags: No results found for: PROTIME, INR, APTT    HCG (If Applicable): No results found for: PREGTESTUR, PREGSERUM, HCG, HCGQUANT     ABGs: No results found for: PHART, PO2ART, NWF7LMR, URM5ZYX, BEART, R3OBMVXZ     Type & Screen (If Applicable):  No results found for: LABABO, LABRH    Drug/Infectious Status (If Applicable):  No results found for: HIV, HEPCAB    COVID-19 Screening (If Applicable): No results found for: COVID19        Anesthesia Evaluation  Patient summary reviewed and Nursing notes reviewed no history of anesthetic complications:   Airway: Mallampati: III  TM distance: >3 FB   Neck ROM: full  Mouth opening: > = 3 FB   Dental:          Pulmonary:normal exam  breath sounds clear to auscultation  (+) sleep apnea: on noncompliant,  current smoker    (-) COPD and asthma                           Cardiovascular:    (+) CAD (neg stress nml EF 2020; no recent issues/symptoms/ntg use):, CABG/stent (stent 2008):,     (-) hypertension, past MI, dysrhythmias,  angina and  CHF (echo 2022 EF 60-65 gr 1 dd)    ECG reviewed  Rhythm: regular  Rate: normal  Echocardiogram reviewed  Stress test reviewed                Neuro/Psych:   (+) neuromuscular disease (chronic pain syndrome, chronic narc use, lumbar ddd):, psychiatric history (PTSD):   (-) seizures, TIA and CVA           GI/Hepatic/Renal:   (+) GERD: well controlled,      (-) liver disease and no renal disease       Endo/Other:    (+) : arthritis: OA., .    (-) diabetes mellitus, hypothyroidism, hyperthyroidism               Abdominal:   (+) obese,           Vascular:   + PVD, aortic or cerebral (3/22: Mild dilatation of the aortic root and ascending thoracic aorta measuring up to 4.5 cm transverse), DVT (h/o, no current ac req), . Other Findings:           Anesthesia Plan      general and regional     ASA 3     (Consented for GA and IPACK/adductor canal block, risks/benefits discussed including nerve damage, bleeding, infection, and local anesthetic toxicity, patient understanding and wishes to proceed)  Induction: intravenous. MIPS: Postoperative opioids intended and Prophylactic antiemetics administered. Anesthetic plan and risks discussed with patient. Plan discussed with CRNA.                     Will Marques MD   6/29/2022

## 2022-06-29 NOTE — PROGRESS NOTES
4:06 PM  Patient refused this RN to take his VS. This RN left the patient room immediately he started to be very loud. The Charge nurse stepped in.

## 2022-06-29 NOTE — ANESTHESIA POSTPROCEDURE EVALUATION
Department of Anesthesiology  Postprocedure Note    Patient: Tyesha Topete  MRN: [id number]  YOB: 1950  Date of evaluation: 6/29/2022      Procedure Summary     Date: 06/29/22 Room / Location: [address]    Anesthesia Start: 0730 Anesthesia Stop: 1033    Procedure: RIGHT ROBOTIC ASSISTED TOTAL KNEE ARTHROPLASTY -Rojas Morris & NEPHEW STANDARD/ADVANCED (Right Knee) Diagnosis:       Osteoarthritis of right knee, unspecified osteoarthritis type      (Osteoarthritis of right knee, unspecified osteoarthritis type [M17.11])    Surgeons: Thai Altamirano MD Responsible Provider: Linda Renteria MD    Anesthesia Type: general, regional ASA Status: 3          Anesthesia Type: No value filed.     Billie Phase I: Billie Score: 7    Billie Phase II:        Anesthesia Post Evaluation    Patient location during evaluation: PACU  Patient participation: complete - patient participated  Level of consciousness: awake and alert  Airway patency: patent  Nausea & Vomiting: no vomiting and no nausea  Complications: no  Cardiovascular status: hemodynamically stable  Respiratory status: acceptable  Hydration status: stable

## 2022-06-29 NOTE — PROGRESS NOTES
Pt arrived from OR to PACU bay 10. Reported received from [facility] [address] RN/CRNA staff. Pt  arousable to voice. Surgical incisions dressings in place to right knee, dressing in place with knee immobilizer. Pt on 6L simple mask, NSR, and VSS. Will continue to monitor.

## 2022-06-29 NOTE — PROGRESS NOTES
Admission and shift assessment completed. Neuro WNL. Reports pain 7/10 to RLE. Meds administered. The care plan and education has been reviewed and mutually agreed upon with the patient. Standard safety measures in place. Will continue to monitor patient.

## 2022-06-29 NOTE — PROGRESS NOTES
Department of Orthopedic Surgery  Joint Service  Attending Progress Note        Subjective:  No complaints but just waking up from surgery in the PACU    Vitals  VITALS:  BP (!) 153/87   Pulse 61   Temp 98.2 °F (36.8 °C) (Oral)   Resp 12   Ht 5' 9.5\" (1.765 m)   Wt 221 lb 12.8 oz (100.6 kg)   SpO2 92%   BMI 32.28 kg/m²     PHYSICAL EXAM:    Orientation:  Awakens to voice, NAD and aware his surgery is complete    Right Lower Extremity    Incision:  dressing in place, clean, dry and intact    Lower Extremity Motor :   Quadriceps:  2/5  Extensor hallucis:  4/5  Dorsiflexion:  4/5  Plantarflexion:  4/5    Lower Extremity Sensory:  Tibial Nerve:  intact  Superficial Peroneal Nerve:  intact  Deep Peroneal Nerve:  intact    Pulses:    Dorsalis Pedis:  2    Brace:  Knee immobilizer in place    LABS:    HgB:    Lab Results   Component Value Date    HGB 14.0 07/28/2017     ASSESSMENT AND PLAN:    Post operative day 0 status post right total knee arthroplasty with robotic assistance    1:  Weight bearing as tolerated  2:  Deep venous thrombosis prophylaxis - Lovenox to start in am  3:  physical therapy / OT  4:  D/C Plan:   Will benefit from acute rehab due to lack of social support  5:  Pain Control:  Resume his methadone, dilaudid for breakthrough pain

## 2022-06-29 NOTE — ANESTHESIA PROCEDURE NOTES
Peripheral Block    Patient location during procedure: pre-op  Reason for block: post-op pain management and at surgeon's request  Start time: 6/29/2022 7:04 AM  End time: 6/29/2022 7:05 AM  Staffing  Performed: anesthesiologist   Anesthesiologist: Lucia Hoover MD  Preanesthetic Checklist  Completed: patient identified, IV checked, site marked, risks and benefits discussed, surgical/procedural consents, equipment checked, pre-op evaluation, timeout performed, anesthesia consent given, oxygen available and monitors applied/VS acknowledged  Peripheral Block   Patient position: supine  Prep: ChloraPrep  Provider prep: mask and sterile gloves  Patient monitoring: cardiac monitor, continuous pulse ox, frequent blood pressure checks, IV access, oxygen and responsive to questions  Block type: iPacks  Laterality: right  Injection technique: single-shot  Guidance: ultrasound guided  Local infiltration: lidocaine  Infiltration strength: 1 %  Local infiltration: lidocaine  Dose: 1 mL    Needle   Needle type: insulated echogenic nerve stimulator needle   Needle gauge: 20 G  Needle localization: ultrasound guidance and anatomical landmarks  Needle length: 10 cm  Assessment   Injection assessment: negative aspiration for heme, no paresthesia on injection, local visualized surrounding nerve on ultrasound and no intravascular symptoms  Paresthesia pain: none  Slow fractionated injection: yes  Hemodynamics: stable  Real-time US image taken/store: yes  Outcomes: patient tolerated procedure well and uncomplicated    Additional Notes  U/S [id number].  (1) Under ultrasound guidance, a 20 gauge needle was inserted and placed in close proximity to the [facility] AVILA nerve.  (2) Ultrasound was also used to visualize the spread of the anesthetic in close proximity to the nerve being blocked.  (3) The nerve appeared anatomically normal, and (4 there were no apparent abnormal pathological findings on the image that were readily visible and related to the nerve being blocked. (5) A permanent ultrasound image was saved in the patient's record.           Medications Administered  Bupivacaine (MARCAINE) injection 0.25%, 20 mL

## 2022-06-29 NOTE — PROGRESS NOTES
This RN, Dr. Uziel Louis anesthesia tech at patient bedside. Pt timeout performed. Pt placed on pulse oximeter and supp O2 at 6L via nasal cannula for comfort.

## 2022-06-29 NOTE — CONSULTS
Hospital Medicine  Consult History & Physical        Chief Complaint:   Right knee pain    Date of Service: Pt seen/examined in consultation on 6/29/2022    History Of Present Illness:      67 y.o. male who we are asked to see/evaluate by Margie Martin MD for medical management of methadone dependent, PTSD, GERD, coronary disease response angioplasty knee arthritis came in for elective right knee arthroplasty. Postoperatively patient is reasonably doing well. Has some pain at the surgical site. No chest pain or shortness of breath or nausea or vomiting. Past Medical History:        Diagnosis Date    Anxiety     CAD (coronary artery disease)     Degenerative arthritis of knee, bilateral     Depression     GERD (gastroesophageal reflux disease)     Opioid dependence with opioid-induced mood disorder (HCC)     PTSD (post-traumatic stress disorder)        Past Surgical History:        Procedure Laterality Date    ARTHRODESIS Right 3/14/2019    RIGHT MPJ IMPLANT, RIGHT FOOT performed by Scott Ash DPM at [address]      x5    KNEE SURGERY Bilateral     x4    OTHER SURGICAL HISTORY Right 03/14/2019    MPJ implant     ROTATOR CUFF REPAIR      TOTAL KNEE ARTHROPLASTY Right 6/29/2022    RIGHT ROBOTIC ASSISTED TOTAL KNEE ARTHROPLASTY -[employer] & NEPHEW STANDARD/ADVANCED performed by Margie Martin MD at \[facility]\""       Medications Prior to Admission:    Prior to Admission medications    Medication Sig Start Date End Date Taking? Authorizing Provider   pantoprazole (PROTONIX) 20 MG tablet Take 20 mg by mouth daily Pt unsure of dose   Yes Historical Provider, MD   diclofenac sodium 1 % GEL Apply 4 g topically 2 times daily as needed     Historical Provider, MD   omeprazole (PRILOSEC) 20 MG delayed release capsule Take 20 mg by mouth Daily     Historical Provider, MD   methadone (DOLOPHINE) 10 MG tablet Take 10 mg by mouth daily.      Historical Provider, MD vitamin D (CHOLECALCIFEROL) 1000 units TABS tablet Take 1,000 Units by mouth 3 times daily    Historical Provider, MD   ascorbic acid (VITAMIN C) 500 MG tablet Take 500 mg by mouth daily    Historical Provider, MD       Allergies:  Patient has no known allergies. Social History:      The patient currently lives at home    TOBACCO:   reports that he has been smoking cigarettes. He has a 48.00 pack-year smoking history. He has never used smokeless tobacco.  ETOH:   reports current alcohol use. Family History:      Reviewed in detail and negative for DM, CAD, Cancer, CVA. Positive as follows:    History reviewed. No pertinent family history. REVIEW OF SYSTEMS COMPLETED:   Pertinent positives as noted in the HPI. All other systems reviewed and negative. PHYSICAL EXAM PERFORMED:  BP (!) 153/87   Pulse 61   Temp 98.2 °F (36.8 °C) (Oral)   Resp 12   Ht 5' 9.5\" (1.765 m)   Wt 221 lb 12.8 oz (100.6 kg)   SpO2 92%   BMI 32.28 kg/m²   General appearance: No apparent distress, appears stated age and cooperative. HEENT: Normal cephalic, atraumatic without obvious deformity. Pupils equal, round, and reactive to light. Extra ocular muscles intact. Conjunctivae/corneas clear. Neck: Supple, with full range of motion. No jugular venous distention. Trachea midline. Respiratory:  Normal respiratory effort. Clear to auscultation, bilaterally without Rales/Wheezes/Rhonchi. Cardiovascular: Regular rate and rhythm with normal S1/S2 without murmurs, rubs or gallops. Abdomen: Soft, non-tender, non-distended with normal bowel sounds. Musculoskeletal: Right knee surgical site noted. Skin: Skin color, texture, turgor normal.  No rashes or lesions. Neurologic:  Neurovascularly intact without any focal sensory/motor deficits.  Cranial nerves: II-XII intact, grossly non-focal.  Psychiatric: Alert and oriented, thought content appropriate, normal insight  Capillary Refill: Brisk,3 seconds, normal   Peripheral Pulses: +2 palpable, equal bilaterally     Labs:     No results for input(s): WBC, HGB, HCT, PLT in the last 72 hours. No results for input(s): NA, K, CL, CO2, BUN, CREATININE, CALCIUM, PHOS in the last 72 hours. Invalid input(s): MAGNES  No results for input(s): AST, ALT, BILIDIR, BILITOT, ALKPHOS in the last 72 hours. No results for input(s): INR in the last 72 hours. No results for input(s): Cherre Gash in the last 72 hours. Urinalysis:    Lab Results   Component Value Date    NITRU Negative 07/28/2017    BLOODU Negative 07/28/2017    SPECGRAV 1.020 07/28/2017    GLUCOSEU Negative 07/28/2017       Radiology: I have reviewed the radiology reports with the following interpretation:     XR KNEE RIGHT (1-2 VIEWS)   Preliminary Result   Status post right knee arthroplasty. EKG:  I have reviewed the EKG with the following interpretation:    @ekgread@      ASSESSMENT:    Active Hospital Problems    Diagnosis Date Noted    Primary osteoarthritis of right knee [M17.11] 06/29/2022     Priority: Medium       PLAN:    Right knee osteoarthritis status post right knee arthroplasty: Continue postop care per Ortho    GERD: PPI    Chronic methadone dependence: Continue methadone    CAD:?  Aspirin? Metoprolol? Statin. will check with the patient tomorrow. DVT Prophylaxis: Lovenox  Diet: ADULT DIET;  Regular  Code Status: Full Code    PT/OT Eval Status: Active and ongoing    Dispo -per primary    Thank you for the consultation, will follow up as needed    Electronically signed by Tita Knapp MD on 6/29/22 at 2:50 PM EDT

## 2022-06-29 NOTE — PROGRESS NOTES
Pt stable and able to be transferred from PACU to room 4467. A&O , VSS. [id number] called and notified that pt is being transferred out of PACU and to room.

## 2022-06-29 NOTE — PROGRESS NOTES
Pt transferred to room 4462 at this time. A&O with no signs of distress. . Report given to [facility]. V/u and denies questions or further needs at this time.

## 2022-06-29 NOTE — BRIEF OP NOTE
Brief Postoperative Note      Patient: Lynnette Maradiaga  YOB: 1950  MRN: [id number]    Date of Procedure: 6/29/2022    Pre-Op Diagnosis: Osteoarthritis of right knee, unspecified osteoarthritis type [M17.11]    Post-Op Diagnosis: Same       Procedure(s):  RIGHT ROBOTIC ASSISTED TOTAL KNEE ARTHROPLASTY -[facility] & NEPH STANDARD/ADVANCED    Surgeon(s):  Brenton Guerra MD    Assistant:  Surgical Assistant: Yecenia Figueroa    Anesthesia: General    Estimated Blood Loss (mL): 218 mL    Complications: None    Specimens:   * No specimens in log *    Implants:  Implant Name Type Inv. Item Serial No.  Lot No. LRB No. Used Action   CEMENT BNE 40GM HI VISC RADPQ FOR REV SURG - [id number]  CEMENT BNE 40GM HI VISC RADPQ FOR REV SURG  [employer]- [id number] Right 1 Implanted   CEMENT BNE 40GM HI VISC RADPQ FOR REV SURG - [id number]  CEMENT BNE 40GM HI VISC RADPQ FOR REV SURG  [employer]- [id number] Right 1 Implanted   COMPONENT PAT [id number] THK9MM STD KNEE RND NP AIRAM BICRUCIATE - [id number]  COMPONENT PAT [id number] THK9MM STD KNEE RND NP AIRAM BICRUCIATE  [facility] AND NEPH ORTHOPAEDICS- [id number] Right 1 Implanted   COMPONENT FEM SZ 8 R KNEE OXINIUM BI CRUCE STBL JOURBuford II - [id number]  COMPONENT FEM SZ 8 R KNEE OXINIUM BI CRUCE STBL Blase [facility] AND NEPH ORTHOPAEDICS- [id number] Right 1 Implanted   BASEPLATE TIB SZ 7 [id number] ML81MM R KNEE NP AIRAM CHINEDUNEY - [id number]  BASEPLATE TIB SZ 7 [id number] ML81MM R KNEE NP [facility] AND NEPH Genaro Hwang [id number] Right 1 Implanted         Drains: * No LDAs found *    Findings: severe osteoarthritis with 15 degree flexion deformity and 5 degrees of varus corrected with navio robotic assistance.     Electronically signed by Chastity Jain MD on 6/29/2022 at 10:13 AM

## 2022-06-29 NOTE — PROGRESS NOTES
Incentive Spirometry education and demonstration completed by Respiratory Therapy Yes      Response to education: Excellent     Teaching Time: 15 minutes    Minimum Predicted Vital Capacity - 730 mL. Patient's Actual Vital Capacity - 1200 mL. Turning over to Nursing for routine follow-up Yes.     Comments: IS goal met    Electronically signed by Luc Cueto RCP on 6/29/2022 at 4:02 PM

## 2022-06-29 NOTE — CARE COORDINATION
Discharge Planning Assessment  Risk of Readmission Score: 4%    RN discharge planner met with patient to discuss reason for admission, current living situation, and potential needs at the time of discharge. Demographics/Insurance verified Yes    Current type of dwelling: The patient resides in a two story home with four uneven steps to enter. The bedroom is located on the second floor. The patient states he is unable to stay on the first floor. Patient from F/ confirmed with: N/A    Living arrangements: Son    Level of function/Support: independent    PCP: none    Last Visit to PCP: N/A    DME: tub/shower    Active with any community resources/agencies/skilled home care: N/A    Medication compliance issues: The patient states he is compliant with his medications but has no PCP. Financial issues that could impact healthcare: none    Tentative discharge plan: PT/OT is recommending ARU, ARU vs SNF, discharge plan TBD.     Transportation at the time of discharge: TBD    FRANKLIN Villalba RN    [facility]  Phone: [phone]

## 2022-06-29 NOTE — PROGRESS NOTES
[facility]   Phone: [phone]    Physical Therapy    [x] Initial Evaluation            [] Daily Treatment Note         [] Discharge Summary      Patient: Telma Power   : 1950   MRN: [id number]   Date of Service:  2022  Admitting Diagnosis: Primary osteoarthritis of right knee  Current Admission Summary: RIGHT ROBOTIC ASSISTED TOTAL KNEE ARTHROPLASTY   Past Medical History:  has a past medical history of Anxiety, CAD (coronary artery disease), Degenerative arthritis of knee, bilateral, Depression, GERD (gastroesophageal reflux disease), Opioid dependence with opioid-induced mood disorder ([facility] [address] and PTSD (post-traumatic stress disorder). Past Surgical History:  has a past surgical history that includes knee surgery (Bilateral); back surgery; Rotator cuff repair; other surgical history (Right, 2019); arthrodesis (Right, 3/14/2019); and Total knee arthroplasty (Right, 2022). Discharge Recommendations: Telma Power scored a 18/24 on the AM-PAC short mobility form. Current research shows that an AM-PAC score of 17 or less is typically not associated with a discharge to the patient's home setting. Based on the patient's AM-PAC score and their current functional mobility deficits, it is recommended that the patient have 5-7 sessions per week of Physical Therapy at d/c to increase the patient's independence. At this time, this patient demonstrates complex nursing, medical, and rehabilitative needs, and would benefit from intensive rehabilitation services upon discharge from the Inpatient setting. This patient demonstrates the ability to participate in and benefit from an intensive therapy program with a coordinated interdisciplinary team approach to foster frequent, structured, and documented communication among disciplines, who will work together to establish, prioritize, and achieve treatment goals.  Please see assessment section for further patient specific details. If patient discharges prior to next session this note will serve as a discharge summary. Please see below for the latest assessment towards goals. DME Required For Discharge: rolling walker  Precautions/Restrictions: high fall risk  Weight Bearing Restrictions: weight bearing as tolerated   [x] Right Lower Extremity   Required Braces/Orthotics: no braces required  - able perform SLR without difficulty - knee immobilizer discontinued   [] Right  [] Left  Positional Restrictions:no positional restrictions    Pre-Admission Information   Lives With: son (states recently moved to son's house, reports he was living in [place] recently)   Type of Home: house  Home Layout: two level, bedroom/bathroom upstairs - states he has been crawling up/down steps. Home Access: 4 step to enter without rails    Bathroom Layout: tub/shower unit  - has a board over tub so he can sit  Toilet Height: standard height  Bathroom Equipment: none  Home Equipment: single point cane  Transfer Assistance: Independent without use of device  Ambulation Assistance:Independent without use of device  ADL Assistance: independent with all ADL's  IADL Assistance: independent with homemaking tasks  Active :        [x] Yes  [] No  Hand Dominance: [] Left  [] Right  Current Employment: retired. Hobbies:   Recent Falls: reports 2 \"bad\" falls in the last 6 months, states fell down steps to gain entrance into home - states 1 fall resulted in torn rotator cuff. States he is unable to go to son's home as they have 8-12 inch steps without rails to gain entrance into home. Also reports poor relationship with daughter-in-law, stating \"I would rather go to hotel\"     -Examination   Vision: '  Vision Gross Assessment: WFL  Hearing:   [place] [facility] [place]  Sensation:   reports numbness and tingling in (R) UE - R hand - states sensation never came back following carpal tunnel surgery.  LE WNL  ROM:   (B) LE AROM WFL, R knee 0-90 degrees  Strength:   (B) LE strength grossly WFL  Decision Making: low complexity  Clinical Presentation: stable      Subjective  General: PT supine in bed upon arrival. Doesn't state any pain at rest, agreeable to PT/OT. Pain: Patient does not rate upon questioning  Pain Interventions: not applicable       Functional Mobility  Bed Mobility  Supine to Sit: stand by assistance  Sit to Supine: stand by assistance  Comments: good control of RLE, increased VC  Transfers  Sit to stand transfer: contact guard assistance  Stand to sit transfer: contact guard assistance  Stand pivot transfer: contact guard assistance - transferred chair to bed without use of RW despite multiple cues stating \" can get myself over there\"  Comments: very impulsive to get up, requires increased VC   Ambulation  Surface:level surface  Assistive Device: rolling walker  Assistance: stand by assistance  Distance: 15' in room  Gait Mechanics: wide JORDYN, decreased step length, poor control of RW  Comments:    Stair Mobility  Stair mobility not completed on this date. Comments:  Wheelchair Mobility:  No w/c mobility completed on this date.   Comments:  Balance  Static Sitting Balance: good: independent with functional balance in unsupported position  Dynamic Sitting Balance: fair (+): maintains balance at SBA/supervision without use of UE support  Static Standing Balance: fair (+): maintains balance at SBA/supervision without use of UE support  Dynamic Standing Balance: fair (-): maintains balance at RW with use of UE support  Comments:    Other Therapeutic Interventions  ADLS completed - see OT note   Functional Outcomes  AM-PAC Inpatient Mobility Raw Score : 18              Cognition  Overall Cognitive Status: Impaired  Following Commands: follows one step commands with repetition, follows one step commands with increased time  Attention Span: difficulty dividing attention  Safety Judgement: decreased awareness of need for assistance, decreased awareness of need for safety  Problem Solving: able to problem solve independently  Insights: decreased awareness of deficits  Initiation: requires cues for some  Sequencing: requires cues for some  Orientation:    alert and oriented x 4  Command Following:   [facility]  Barriers To Learning: cognition  Patient Education: patient educated on goals, PT role and benefits, plan of care, precautions, weight-bearing education, HEP, general safety, functional mobility training, discharge recommendations, max education provided regarding importance of not propping pillow under knee  Learning Assessment:  patient will require reinforcement due to cognitive deficits    Assessment  Activity Tolerance: Pt limited due to self limiting beliefs. Difficult with divided attention, decreased safety awareness, decreased strength   Impairments Requiring Therapeutic Intervention: decreased functional mobility, decreased ROM, decreased strength, decreased safety awareness, decreased cognition, decreased endurance, decreased balance, increased pain  Prognosis: fair  Clinical Assessment: Pt presents post R TKA. Pt with decreased attention to tasks and easily distracted. Pt is very talkative and quick to get agitated. Due to impulsivity, out of room functional mobility held. Pt has decreased strength, ROM, and balance and will benefit from continued skilled therapy to improve these deficits.    Safety Interventions: patient left in bed, bed alarm in place, call light within reach, gait belt, patient at risk for falls and nurse notified    Plan  Frequency: 7 x/week BID tx  Current Treatment Recommendations: strengthening, balance training, functional mobility training, transfer training, gait training, stair training, endurance training and patient/caregiver education    Goals  Patient Goals: to return home   Short Term Goals:  Time Frame: by dc  Patient will complete bed mobility at modified independent   Patient will complete transfers at modified independent   Patient will ambulate 100 ft with use of rolling walker at stand by assistance  Patient will ascend/descend 3 stairs with (B) handrail at contact guard assistance    Therapy Session Time     Second Session Therapy Time:   Individual Concurrent Group Co-treatment   Time In       1333   Time Out       1426   Minutes       53     Timed Code Treatment Minutes:  38    Total Treatment Minutes:  [address], [facility]   Electronically Signed By: Irineo Connolly PT  Therapist observed and directed the above evaluation.  Thanks, Yesenia Lawson, DPT [id number]

## 2022-06-29 NOTE — OP NOTE
the office notes. He has been informed of the risks / benefits / rehab and potential complications of total knee arthroplasty. He is prepared to proceed and consent verified. All of his questions were answered to his satisfaction    Description:   Adolphus Oppenheim was identified in the preoperative holding area and the correct site of his right knee was marked. Consent was verified and all questions answered to the his and present family's satisfaction. He was brought to the operating room, spinal administered and he was placed on the operative table in supine position. A non-sterile tourniquet was applied to the right thigh and set for 300 mmHg. Surgical time-out was called to verify necessary data and the extremity was prepped and draped in standard sterile fashion. An Esmarch bandage was used to exsanguinate the limb and the tourniquet was inflated. A midline incision was created with a #10 blade. Subcutaneous tissue is dissected with electrocautery and the extensor retinaculum was identified. A median parapatellar arthrotomy was performed. Appropriate soft tissue releases were then completed and osteophytes along the border of the tibia and  femoral condyle were removed. A portion of the synovium was also excised as well as the anterior portions of the menisci. A portion of the fat pad was also removed anteriorly with electrocautery. The patella was everted and its thickness measured 26 mm. A freehand resection was completed with 15 mm of residual bone. Femoral and tibial tracking pins were placed with the trackers for the computer guidance using the [facility] robotic platform. Joint range of motion, hip center of rotation and registration for alignment and mapping of the articular surface were completed. Using the computerized planning software, positioning of the components were then optimized.   Soft tissue releases and joint laxity were completed to develop the best possible plan for the patient and the best sizing/positioning of the implant. The surgical plan was then finalized. The knee was brought into flexion and retractors positioned to protect the collateral ligaments. The distal femoral resection was then burred as per technique and the alignment holes for the 5 in 1 cutting block were verified and placed. The holes for the tibial guide were also burred using the computer guidance system with the robotic handpiece. Femoral 5 and 1 cutting block was placed with its orientation verified using the computer guidance angela wing and also visually verified. The 5-in-1 cuts were completed. Cut surfaces were then removed using a curved osteotome. The remaining meniscal remnants were also excised. Retractors were positioned including a posterior retractor to protect the neurovascular structures. The tibial cutting guide was applied to the proximal tibia and pinned in position using the assistance of the navigational software for verification of alignment. The resection depth and alignment were verified visually. The proximal tibial resection was completed. Osteotome and a clamp were used to remove the proximal tibial articular surface. The cuts were then checked with a flexion and extension gap block showing good balance and stability. The distal femoral trial was placed and the posterior stabilize portion was milled as per technique. The tibia was sized with a tibial baseplate to a size 7 and a trial articulating polyethylene surface measuring 9 mm thick was inserted. The patella was trialed for a size 35 mm button and the drill holes were made for the pegs. The trial patella was placed and showed excellent tracking.   Measurements with neutral as well as varus and valgus stress were checked using the [address] showing excellent restoration of limb alignment and balance of the flexion and extension gaps throughout range of motion with less than 1 to 2 mm of laxity throughout the range. The tibial component rotation was marked. The distal femoral trial was removed and the tibial baseplate was then pinned into position and drilled and punched as per technique. 60 mL of a mixture of orthopaedic analgesic mixture diluted in saline was carefully infiltrated into the posterior capsule and medial / lateral gutter regions with a 22 gauge needle. Care was taken to ensure removal of posterior femoral osteophytes as well. The cut surfaces of the bone were cleansed first with dilute chlorhexidine followed by pulsatile lavage. The cement was mixed on the back table as per technique. Final components were then cemented in position in a stepwise fashion with excess cement removed at each step. Knee was held in full extension with the trial polyethylene inserted and the patella was clamped into position. The cement was allowed to cure. A solution of dilute chlorhexidine was placed in the wound and allowed to sit for 2-3 minutes to aid in bacterial control. It was then removed with suction and the entire joint was again cleansed with pulsatile lavage. The knee was then taken out of extension position and any excess cement was carefully removed with a quarter-inch osteotome. The knee continued show excellent tracking with good range of motion and stability to varus and valgus stress. The trial polyethylene was removed. Care was taken to ensure all posterior cement was removed and the knee was thoroughly irrigated with pulsatile lavage. The final 9 mm polyethylene surface was then snapped into position without difficulty and again showed similar stability, range of motion, and patellar tracking as trials. The tourniquet was then deflated and hemostasis was achieved. 30 mL of orthopedic analgesia mixture diluted in saline was carefully infiltrated into the distal femur periosteum and anterior capsular tissue. The joint was closed with running #2 Stratfix.   Skin was closed with interrupted 2-0 Vicryl and running 3-0 Monocryl. Dermabond and Prineo dressing were applied and allowed to dry. The dressing was covered with silver impregnated Therabond. The limb was wrapped with sterile cast padding at the knee and from the foot to the thigh with an Ace bandage. A cryocuff was also applied over the Ace wrap. The limb was placed in an immobilizer to be utilized with ambulation until adequate quad function returns. All needle and sponge counts matched the initial count per circulating and scrub personnel x2 prior to ending the case and the patient was awakened and taken to the recovery room in stable condition with brisk capillary refill to the operative limb and having tolerated the procedure well. Electronically signed by:      Santos Castrejon MD, [address] [place] [address]  Board Certified Orthopaedic Surgeon    [facility]    [address]   [address]    [phone]    Fax [phone]    22  10:15 AM      Electronically signed by Latia Polanco MD on 2022 at 10:14 AM

## 2022-06-29 NOTE — ANESTHESIA PROCEDURE NOTES
Peripheral Block    Patient location during procedure: pre-op  Reason for block: post-op pain management and at surgeon's request  Start time: 6/29/2022 7:07 AM  End time: 6/29/2022 7:09 AM  Staffing  Performed: anesthesiologist   Anesthesiologist: Jose L Krishna MD  Preanesthetic Checklist  Completed: patient identified, IV checked, site marked, risks and benefits discussed, surgical/procedural consents, equipment checked, pre-op evaluation, timeout performed, anesthesia consent given, oxygen available and monitors applied/VS acknowledged  Peripheral Block   Patient position: left lateral decubitus  Prep: ChloraPrep  Provider prep: mask and sterile gloves  Patient monitoring: cardiac monitor, continuous pulse ox, frequent blood pressure checks, IV access, responsive to questions and oxygen  Block type: Femoral  Adductor canal  Laterality: right  Injection technique: single-shot  Guidance: ultrasound guided  Local infiltration: lidocaine  Infiltration strength: 1 %  Local infiltration: lidocaine  Dose: 1 mL    Needle   Needle type: insulated echogenic nerve stimulator needle   Needle gauge: 20 G  Needle localization: ultrasound guidance and anatomical landmarks  Needle length: 10 cm  Assessment   Injection assessment: negative aspiration for heme, no paresthesia on injection, local visualized surrounding nerve on ultrasound, no intravascular symptoms and low pressure verified by pressure monitor  Paresthesia pain: none  Slow fractionated injection: yes  Hemodynamics: stable  Real-time US image taken/store: yes  Outcomes: uncomplicated and patient tolerated procedure well    Additional Notes  U/S 17648.  (1) Under ultrasound guidance, a 20 gauge needle was inserted and placed in close proximity to the [facility] nerve.  (2) Ultrasound was also used to visualize the spread of the anesthetic in close proximity to the nerve being blocked.  (3) The nerve appeared anatomically normal, and (4 there were no apparent abnormal

## 2022-06-29 NOTE — PROGRESS NOTES
[facility] Department   Phone: [phone]    Occupational Therapy    [x] Initial Evaluation            [] Daily Treatment Note         [] Discharge Summary      Patient: Trae Foote   : 1950   MRN: [id number]   Date of Service:  2022    Admitting Diagnosis:  Primary osteoarthritis of right knee  Current Admission Summary: RIGHT ROBOTIC ASSISTED TOTAL KNEE ARTHROPLASTY   Past Medical History:  has a past medical history of Anxiety, CAD (coronary artery disease), Degenerative arthritis of knee, bilateral, Depression, GERD (gastroesophageal reflux disease), Opioid dependence with opioid-induced mood disorder ([facility] [address] and PTSD (post-traumatic stress disorder). Past Surgical History:  has a past surgical history that includes knee surgery (Bilateral); back surgery; Rotator cuff repair; other surgical history (Right, 2019); arthrodesis (Right, 3/14/2019); and Total knee arthroplasty (Right, 2022). Discharge Recommendations: Trae Foote scored a 17/24 on the AM-PAC ADL Inpatient form. Current research shows that an AM-PAC score of 17 or less is typically not associated with a discharge to the patient's home setting. Based on the patient's AM-PAC score and their current ADL deficits, it is recommended that the patient have 5-7 sessions per week of Occupational Therapy at d/c to increase the patient's independence. At this time, this patient demonstrates complex nursing, medical, and rehabilitative needs, and would benefit from intensive rehabilitation services upon discharge from the Inpatient setting. This patient demonstrates the ability to participate in and benefit from an intensive therapy program with a coordinated interdisciplinary team approach to foster frequent, structured, and documented communication among disciplines, who will work together to establish, prioritize, and achieve treatment goals.  Please see assessment section for further -3    Decision Making: medium complexity  Clinical Presentation: stable      Subjective  General: Pt presenting supine in bed upon arrival. He is verbose, and misogynistic toward female staff. Pt perseverates on his complicated medical/surgical history and requires maximum redirection. Pt also requires maximum education for surgical protocols and positioning and remains resistive. Additionally, he is extremely impulsive with poor safety awareness, and has poor insight into his strengths and limitations. Pain: Patient does not rate upon questioning  Pain Interventions: not applicable        Activities of Daily Living  Basic Activities of Daily Living  General Comments: Pt declines ADLs this session  Instrumental Activities of Daily Living  No IADL completed on this date. Functional Mobility  Bed Mobility  Supine to Sit: stand by assistance  Sit to Supine: stand by assistance  Comments:  Transfers  Sit to stand transfer:contact guard assistance  Stand to sit transfer: contact guard assistance  Stand step transfer: contact guard assistance  Toilet transfer: contact guard assistance. Mobility technique: ambulating. Equipment utilized: standard toilet, grab bars  Comments: poor safety awareness, impulsive and requires max cues for redirection to tasks with increased education in need for RW during all mobility  Functional Mobility:  Standing Balance: contact guard assistance. Functional Mobility: rolling walker. contact guard assistance, . Comment Pt ambulated 15ft in room (5ft initially with no DME) to/from bathroom and to recliner. Please see PT notes for further details regarding gait quality. .  Activity: to/from bathroom    Other Therapeutic Interventions    Functional Outcomes  AM-PAC Inpatient Daily Activity Raw Score: 17    Cognition  WFL   --please see subjective section for details regarding poor safety awareness. Suspect this is personality/behaviorally driven.    Orientation:    alert and oriented x 4  Command Following:   [facility]     Education  Barriers To Learning: none  Patient Education: patient educated on goals, OT role and benefits, plan of care, precautions, proper use of assistive device/equipment, transfer training, discharge recommendations, Increased time required for all education secondary to pt argumentative tendencies with need for constant redirection to tasks/activities. Learning Assessment:  patient resistive towards education topics within session, would benefit from continued education    Assessment  Activity Tolerance: Pt tolerated treatment well  Impairments Requiring Therapeutic Intervention: decreased functional mobility, decreased ADL status, decreased ROM, decreased strength, decreased safety awareness, decreased endurance, decreased balance, increased pain  Prognosis: good  Clinical Assessment: Pt presenting below his functional baseline with the above deficits associated with R TKR. He is limited by weakness, decreased balance/stability, and decreased endurance. Additionally, pt with difficult behaviors and poor insight into his functional deficits. Furthermore, he is resistant to therapy education topics. He also reports he \"has nowhere else to go\" after discharge since his son's home is not conducive to his needs. He states, \"I would rather just go to a hotel\". Continued OT indicated in order to maximize safety and independence with all occupational pursuits.    Safety Interventions: patient left in bed, bed alarm in place, call light within reach, gait belt and nurse notified    Plan  Frequency: 7 x/week  Current Treatment Recommendations: strengthening, ROM, balance training, functional mobility training, transfer training, gait training, stair training, endurance training, modalities, ADL/self-care training, home management training and safety education    Goals  Patient Goals: \"I want to get both my legs back so I can be 25years old again\"   Short Term Goals:  Time Frame: Discharge  Patient will complete upper body ADL at Independent   Patient will complete lower body ADL at Independent   Patient will complete toileting at Independent   Patient will complete grooming at Independent   Patient will complete functional transfers at [facility]   Time In    1333   Time Out    1426   Minutes    53       Timed Code Treatment Minutes: 38 Minutes  Total Treatment Minutes:  53 minutes       Electronically Signed By: DALE Rouse OTR/L  [id number]

## 2022-06-29 NOTE — H&P
Department of Orthopedic Surgery  Attending Pre-operative History and Physical        DIAGNOSIS:  Right knee osteoarthritis    INDICATION:  Severe right knee pain with activity    PROCEDURE:  Right total knee arthroplasty    CHIEF COMPLAINT:  Right knee pain    History Obtained From:  patient, electronic medical record    HISTORY OF PRESENT ILLNESS:      The patient is a 67 y.o. male who presents with severe bilateral knee arthritis. He presents for elective right knee arthroplasty after several years of attempted conservative treatment at the [facility]. Right knee pain is 10/10 despite methadone for his back pain. His walking distance is limited and he uses a walker at times. He occasionally falls due to his knee pain and at times he is crawling at home. He was medically evaluated at the [facility] including a cardiac evaluation and deemed fit for elective right knee arthroplasty. Past Medical History:        Diagnosis Date    Anxiety     CAD (coronary artery disease)     Degenerative arthritis of knee, bilateral     Depression     GERD (gastroesophageal reflux disease)     Opioid dependence with opioid-induced mood disorder (HCC)     PTSD (post-traumatic stress disorder)      Past Surgical History:        Procedure Laterality Date    ARTHRODESIS Right 3/14/2019    RIGHT MPJ IMPLANT, RIGHT FOOT performed by Michelle Garcia DPM at [address]      x5    KNEE SURGERY Bilateral     x4    OTHER SURGICAL HISTORY Right 03/14/2019    MPJ implant     ROTATOR CUFF REPAIR       Medications Prior to Admission:   Medications Prior to Admission: pantoprazole (PROTONIX) 20 MG tablet, Take 20 mg by mouth daily Pt unsure of dose  diclofenac sodium 1 % GEL, Apply 4 g topically 2 times daily as needed   omeprazole (PRILOSEC) 20 MG delayed release capsule, Take 20 mg by mouth Daily   methadone (DOLOPHINE) 10 MG tablet, Take 10 mg by mouth daily.    vitamin D (CHOLECALCIFEROL) 1000 units TABS tablet, Take 1,000 Units by mouth 3 times daily  ascorbic acid (VITAMIN C) 500 MG tablet, Take 500 mg by mouth daily  Allergies:  Patient has no known allergies. History of allergic reaction to anesthesia:  No    Social History:   TOBACCO:   reports that he has been smoking cigarettes. He has a 48.00 pack-year smoking history. He has never used smokeless tobacco.  ETOH:   reports current alcohol use. DRUGS:   reports no history of drug use. Family History:   History reviewed. No pertinent family history. REVIEW OF SYSTEMS:  Denies HA, fever, chills, CP, SOA, GI or  issues. +PTSD and + chronic opioid dependency      PHYSICAL EXAM:     VITALS:  BP (!) 152/92   Pulse 54   Temp 96.8 °F (36 °C) (Temporal)   Resp 14   Ht 5' 9.5\" (1.765 m)   Wt 221 lb 12.8 oz (100.6 kg)   SpO2 96%   BMI 32.28 kg/m²     Eyes:  lids and lashes normal, extra-ocular muscles intact and sclera clear    Head/ENT:  normocepalic, without obvious abnormality, atraumatic    Neck:  supple, symmetrical, trachea midline and skin normal    Heart:  regular rate and rhythm and normal S1 and S2    Lungs:  no increased work of breathing, good air exchange and clear to auscultation    Abdomen:  soft, non-distended and non-tender    Extremities:  No clubbing, cyanosis, or edema    Right knee: Mild effusion and moderate varus deformity, healed scar from prior open meniscectomy medially. Pain and crepitation with ROM 8 - 110. No gross ligamentous instability. ASSESSMENT AND PLAN:    1. Patient is a 67 y.o. male with above specified procedure planned with anesthesia    2. Procedure options, risks and benefits reviewed with patient. Patient expresses understanding. We discussed the option of proceeding onto elective right total knee arthroplasty with robotic assistance. I reviewed with him the nature the procedure as well as the risks and advantages of joint replacement.   We reviewed the risks of surgery and he understands that they include but are not

## 2022-06-30 LAB
ANION GAP SERPL CALCULATED.3IONS-SCNC: 7 MMOL/L (ref 3–16)
BUN BLDV-MCNC: 16 MG/DL (ref 7–20)
CALCIUM SERPL-MCNC: 8.1 MG/DL (ref 8.3–10.6)
CHLORIDE BLD-SCNC: 103 MMOL/L (ref 99–110)
CO2: 24 MMOL/L (ref 21–32)
CREAT SERPL-MCNC: 0.6 MG/DL (ref 0.8–1.3)
GFR AFRICAN AMERICAN: >60
GFR NON-AFRICAN AMERICAN: >60
GLUCOSE BLD-MCNC: 92 MG/DL (ref 70–99)
HCT VFR BLD CALC: 28.4 % (ref 40.5–52.5)
HEMOGLOBIN: 9.2 G/DL (ref 13.5–17.5)
MCH RBC QN AUTO: 28.6 PG (ref 26–34)
MCHC RBC AUTO-ENTMCNC: 32.5 G/DL (ref 31–36)
MCV RBC AUTO: 88.1 FL (ref 80–100)
PDW BLD-RTO: 15.9 % (ref 12.4–15.4)
PLATELET # BLD: 169 K/UL (ref 135–450)
PMV BLD AUTO: 9 FL (ref 5–10.5)
POTASSIUM SERPL-SCNC: 4.3 MMOL/L (ref 3.5–5.1)
RBC # BLD: 3.22 M/UL (ref 4.2–5.9)
SODIUM BLD-SCNC: 134 MMOL/L (ref 136–145)
WBC # BLD: 6.7 K/UL (ref 4–11)

## 2022-06-30 PROCEDURE — 6360000002 HC RX W HCPCS: Performed by: ORTHOPAEDIC SURGERY

## 2022-06-30 PROCEDURE — 97116 GAIT TRAINING THERAPY: CPT

## 2022-06-30 PROCEDURE — 85027 COMPLETE CBC AUTOMATED: CPT

## 2022-06-30 PROCEDURE — 6370000000 HC RX 637 (ALT 250 FOR IP): Performed by: ORTHOPAEDIC SURGERY

## 2022-06-30 PROCEDURE — 1200000000 HC SEMI PRIVATE

## 2022-06-30 PROCEDURE — 80048 BASIC METABOLIC PNL TOTAL CA: CPT

## 2022-06-30 PROCEDURE — 97110 THERAPEUTIC EXERCISES: CPT

## 2022-06-30 PROCEDURE — 36415 COLL VENOUS BLD VENIPUNCTURE: CPT

## 2022-06-30 PROCEDURE — 2580000003 HC RX 258: Performed by: ORTHOPAEDIC SURGERY

## 2022-06-30 RX ORDER — HYDROXYZINE HYDROCHLORIDE 10 MG/1
10 TABLET, FILM COATED ORAL EVERY 8 HOURS PRN
Status: CANCELLED | OUTPATIENT
Start: 2022-06-30

## 2022-06-30 RX ORDER — ONDANSETRON 4 MG/1
4 TABLET, ORALLY DISINTEGRATING ORAL EVERY 8 HOURS PRN
Status: CANCELLED | OUTPATIENT
Start: 2022-06-30

## 2022-06-30 RX ORDER — HYDRALAZINE HYDROCHLORIDE 25 MG/1
50 TABLET, FILM COATED ORAL EVERY 8 HOURS PRN
Status: CANCELLED | OUTPATIENT
Start: 2022-06-30

## 2022-06-30 RX ORDER — SODIUM CHLORIDE 0.9 % (FLUSH) 0.9 %
5-40 SYRINGE (ML) INJECTION EVERY 12 HOURS SCHEDULED
Status: CANCELLED | OUTPATIENT
Start: 2022-06-30

## 2022-06-30 RX ORDER — TRAZODONE HYDROCHLORIDE 50 MG/1
50 TABLET ORAL NIGHTLY PRN
Status: CANCELLED | OUTPATIENT
Start: 2022-06-30

## 2022-06-30 RX ORDER — HYDROMORPHONE HYDROCHLORIDE 2 MG/1
2 TABLET ORAL EVERY 4 HOURS PRN
Status: CANCELLED | OUTPATIENT
Start: 2022-06-30

## 2022-06-30 RX ORDER — KETOROLAC TROMETHAMINE 15 MG/ML
15 INJECTION, SOLUTION INTRAMUSCULAR; INTRAVENOUS 3 TIMES DAILY
Status: DISCONTINUED | OUTPATIENT
Start: 2022-06-30 | End: 2022-07-01 | Stop reason: HOSPADM

## 2022-06-30 RX ORDER — VITAMIN B COMPLEX
1000 TABLET ORAL 3 TIMES DAILY
Status: CANCELLED | OUTPATIENT
Start: 2022-06-30

## 2022-06-30 RX ORDER — HYDROMORPHONE HYDROCHLORIDE 2 MG/1
1 TABLET ORAL EVERY 4 HOURS PRN
Status: CANCELLED | OUTPATIENT
Start: 2022-06-30

## 2022-06-30 RX ORDER — SODIUM CHLORIDE 9 MG/ML
INJECTION, SOLUTION INTRAVENOUS PRN
Status: CANCELLED | OUTPATIENT
Start: 2022-06-30

## 2022-06-30 RX ORDER — METHADONE HYDROCHLORIDE 10 MG/1
20 TABLET ORAL DAILY
Status: CANCELLED | OUTPATIENT
Start: 2022-06-30

## 2022-06-30 RX ORDER — PANTOPRAZOLE SODIUM 40 MG/1
40 TABLET, DELAYED RELEASE ORAL
Status: CANCELLED | OUTPATIENT
Start: 2022-07-01

## 2022-06-30 RX ORDER — ASCORBIC ACID 500 MG
500 TABLET ORAL DAILY
Status: CANCELLED | OUTPATIENT
Start: 2022-06-30

## 2022-06-30 RX ORDER — SODIUM PHOSPHATE, DIBASIC AND SODIUM PHOSPHATE, MONOBASIC 7; 19 G/133ML; G/133ML
1 ENEMA RECTAL DAILY PRN
Status: CANCELLED | OUTPATIENT
Start: 2022-06-30

## 2022-06-30 RX ORDER — SODIUM CHLORIDE 0.9 % (FLUSH) 0.9 %
5-40 SYRINGE (ML) INJECTION PRN
Status: CANCELLED | OUTPATIENT
Start: 2022-06-30

## 2022-06-30 RX ORDER — KETOROLAC TROMETHAMINE 15 MG/ML
15 INJECTION, SOLUTION INTRAMUSCULAR; INTRAVENOUS 3 TIMES DAILY
Status: CANCELLED | OUTPATIENT
Start: 2022-06-30 | End: 2022-07-02

## 2022-06-30 RX ORDER — ACETAMINOPHEN 325 MG/1
650 TABLET ORAL EVERY 6 HOURS SCHEDULED
Status: CANCELLED | OUTPATIENT
Start: 2022-06-30

## 2022-06-30 RX ORDER — ENOXAPARIN SODIUM 100 MG/ML
40 INJECTION SUBCUTANEOUS DAILY
Status: CANCELLED | OUTPATIENT
Start: 2022-06-30

## 2022-06-30 RX ADMIN — Medication 1000 UNITS: at 08:09

## 2022-06-30 RX ADMIN — KETOROLAC TROMETHAMINE 15 MG: 15 INJECTION, SOLUTION INTRAMUSCULAR; INTRAVENOUS at 21:47

## 2022-06-30 RX ADMIN — ENOXAPARIN SODIUM 40 MG: 100 INJECTION SUBCUTANEOUS at 08:10

## 2022-06-30 RX ADMIN — BISACODYL 5 MG: 5 TABLET, COATED ORAL at 08:10

## 2022-06-30 RX ADMIN — HYDROMORPHONE HYDROCHLORIDE 2 MG: 2 TABLET ORAL at 12:41

## 2022-06-30 RX ADMIN — METHADONE HYDROCHLORIDE 20 MG: 10 TABLET ORAL at 08:10

## 2022-06-30 RX ADMIN — HYDROMORPHONE HYDROCHLORIDE 2 MG: 2 TABLET ORAL at 21:48

## 2022-06-30 RX ADMIN — ACETAMINOPHEN 650 MG: 325 TABLET ORAL at 12:42

## 2022-06-30 RX ADMIN — Medication 1000 UNITS: at 13:57

## 2022-06-30 RX ADMIN — HYDROXYZINE HYDROCHLORIDE 10 MG: 10 TABLET ORAL at 21:48

## 2022-06-30 RX ADMIN — PANTOPRAZOLE SODIUM 40 MG: 40 TABLET, DELAYED RELEASE ORAL at 05:38

## 2022-06-30 RX ADMIN — SODIUM CHLORIDE, PRESERVATIVE FREE 10 ML: 5 INJECTION INTRAVENOUS at 21:47

## 2022-06-30 RX ADMIN — KETOROLAC TROMETHAMINE 15 MG: 15 INJECTION, SOLUTION INTRAMUSCULAR; INTRAVENOUS at 13:57

## 2022-06-30 RX ADMIN — KETOROLAC TROMETHAMINE 15 MG: 15 INJECTION, SOLUTION INTRAMUSCULAR; INTRAVENOUS at 08:18

## 2022-06-30 RX ADMIN — HYDROMORPHONE HYDROCHLORIDE 2 MG: 2 TABLET ORAL at 05:38

## 2022-06-30 RX ADMIN — SODIUM CHLORIDE: 9 INJECTION, SOLUTION INTRAVENOUS at 05:39

## 2022-06-30 RX ADMIN — ACETAMINOPHEN 650 MG: 325 TABLET ORAL at 05:38

## 2022-06-30 RX ADMIN — OXYCODONE HYDROCHLORIDE AND ACETAMINOPHEN 500 MG: 500 TABLET ORAL at 08:10

## 2022-06-30 RX ADMIN — Medication 1000 UNITS: at 21:47

## 2022-06-30 RX ADMIN — ACETAMINOPHEN 650 MG: 325 TABLET ORAL at 17:42

## 2022-06-30 ASSESSMENT — PAIN SCALES - GENERAL
PAINLEVEL_OUTOF10: 7
PAINLEVEL_OUTOF10: 7
PAINLEVEL_OUTOF10: 6
PAINLEVEL_OUTOF10: 8
PAINLEVEL_OUTOF10: 9
PAINLEVEL_OUTOF10: 1
PAINLEVEL_OUTOF10: 6
PAINLEVEL_OUTOF10: 9
PAINLEVEL_OUTOF10: 0

## 2022-06-30 ASSESSMENT — PAIN DESCRIPTION - DESCRIPTORS
DESCRIPTORS: ACHING
DESCRIPTORS: SORE
DESCRIPTORS: ACHING
DESCRIPTORS: SORE
DESCRIPTORS: ACHING
DESCRIPTORS: ACHING

## 2022-06-30 ASSESSMENT — PAIN DESCRIPTION - LOCATION
LOCATION: KNEE;LEG
LOCATION: KNEE
LOCATION: KNEE;LEG
LOCATION: KNEE
LOCATION: KNEE;LEG
LOCATION: KNEE;LEG
LOCATION: KNEE

## 2022-06-30 ASSESSMENT — PAIN DESCRIPTION - ORIENTATION
ORIENTATION: RIGHT

## 2022-06-30 ASSESSMENT — PAIN DESCRIPTION - PAIN TYPE: TYPE: CHRONIC PAIN

## 2022-06-30 NOTE — PROGRESS NOTES
[facility]   Phone: [phone]    Physical Therapy    [] Initial Evaluation            [x] Daily Treatment Note         [] Discharge Summary      Patient: Fanta Rahman   : 1950   MRN: [id number]   Date of Service:  2022  Admitting Diagnosis: Primary osteoarthritis of right knee  Current Admission Summary: RIGHT ROBOTIC ASSISTED TOTAL KNEE ARTHROPLASTY   Past Medical History:  has a past medical history of Anxiety, CAD (coronary artery disease), Degenerative arthritis of knee, bilateral, Depression, GERD (gastroesophageal reflux disease), Opioid dependence with opioid-induced mood disorder ([facility] [address] and PTSD (post-traumatic stress disorder). Past Surgical History:  has a past surgical history that includes knee surgery (Bilateral); back surgery; Rotator cuff repair; other surgical history (Right, 2019); arthrodesis (Right, 3/14/2019); and Total knee arthroplasty (Right, 2022). Discharge Recommendations: Fanta Rahman scored a 19/24 on the AM-PAC short mobility form. Current research shows that an AM-PAC score of 17 or less is typically not associated with a discharge to the patient's home setting. Based on the patient's AM-PAC score and their current functional mobility deficits, it is recommended that the patient have 5-7 sessions per week of Physical Therapy at d/c to increase the patient's independence. At this time, this patient demonstrates complex nursing, medical, and rehabilitative needs, and would benefit from intensive rehabilitation services upon discharge from the Inpatient setting. This patient demonstrates the ability to participate in and benefit from an intensive therapy program with a coordinated interdisciplinary team approach to foster frequent, structured, and documented communication among disciplines, who will work together to establish, prioritize, and achieve treatment goals.  Please see assessment section for further patient specific details. If patient discharges prior to next session this note will serve as a discharge summary. Please see below for the latest assessment towards goals. DME Required For Discharge: rolling walker  Precautions/Restrictions: high fall risk  Weight Bearing Restrictions: weight bearing as tolerated   [x] Right Lower Extremity   Required Braces/Orthotics: no braces required  - able perform SLR without difficulty - knee immobilizer discontinued   [] Right  [] Left  Positional Restrictions:no positional restrictions    Pre-Admission Information   Lives With: son (states recently moved to son's house, reports he was living in [place] recently)   Type of Home: house  Home Layout: two level, bedroom/bathroom upstairs - states he has been crawling up/down steps  Home Access: 4 step to enter without rails    Bathroom Layout: tub/shower unit  - has a board over tub so he can sit  Toilet Height: standard height  Bathroom Equipment: none  Home Equipment: single point cane  Transfer Assistance: Independent without use of device  Ambulation Assistance:Independent without use of device  ADL Assistance: independent with all ADL's  IADL Assistance: independent with homemaking tasks  Active :        [x] Yes  [] No  Hand Dominance: [] Left  [] Right  Current Employment: retired. Hobbies:   Recent Falls: reports 2 \"bad\" falls in the last 6 months, states fell down steps to gain entrance into home - states 1 fall resulted in torn rotator cuff. States he is unable to go to son's home as they have 8-12 inch steps without rails to gain entrance into home. Also reports poor relationship with daughter-in-law, stating \"I would rather go to hotel\"      Subjective  General: Pt seated EOB on arrival, agreeable to participate in PT treatment session. Pain: 7/10.   Location: R knee  Pain Interventions: pain medication in place prior to arrival     Functional Mobility  Bed Mobility  Sit to Supine: stand by assistance  Comments: good control of RLE  Transfers  Sit to stand transfer: stand by assistance  Stand to sit transfer: stand by assistance   Comments: RW utilized for external support, decreased eccentric control on stand to sit transfer, occasional cues required for hand placement  Ambulation  Surface:level surface  Assistive Device: rolling walker  Assistance: stand by assistance  Distance: 100'  Gait Mechanics: increased JORDYN, decreased stance time on R LE leading to shortened L LE step length, antalgic gait, increased reliance on the RW  Comments: Further ambulation deferred this date due to increasing pain in pts R knee   Stair Mobility  Stair mobility not completed on this date. Comments:  Wheelchair Mobility:  No w/c mobility completed on this date. Comments:  Balance  Static Sitting Balance: good: independent with functional balance in unsupported position  Dynamic Sitting Balance: fair (+): maintains balance at SBA/supervision without use of UE support  Static Standing Balance: fair (+): maintains balance at SBA/supervision without use of UE support  Dynamic Standing Balance: fair (-): maintains balance at SBA with use of UE support  Comments:    Other Therapeutic Interventions  Seated heel slides (1 x 10 R with 5 second holds at end ROM)  Supine quad sets (1 x 20 R)    Functional Outcomes  AM-PAC Inpatient Mobility Raw Score : 19               Second Session:  Pt seated EOB on arrival, agreeable to participate in PT treatment session. Pt reports 6/10 pain however pain medication in place prior to PT arrival. Sit <=> stand transfers completed throughout session at [facility]. Pt ambulates 100' + 21' within session using RW at [facility] with same mechanics as first session. Pt asc/dec 4 steps (6 inch) with B handrails, step-to pattern, and SBA. Pt required verbal cueing for which LE to lead with on the steps. Pt also asc/dec a curb step (4 inch) 2x with R hand held assist and SBA.  Pt demonstrated significantly impaired safety awareness this session as noted by declining use of  socks and gait belt. Pt also ambulates without his RW at times and requires verbal cueing to use the RW. Pt returned to his room and was left in his recliner with an ice bag applied to his R knee, call light within reach, and chair alarm on. Cognition  Overall Cognitive Status: Impaired  Following Commands: follows one step commands consistently  Attention Span: difficulty dividing attention  Safety Judgement: decreased awareness of need for assistance, decreased awareness of need for safety  Problem Solving: able to problem solve independently  Insights: decreased awareness of deficits  Initiation: requires cues for some  Sequencing: requires cues for some  Orientation:    alert and oriented x 4  Command Following:   [facility]    Education  Barriers To Learning: cognition  Patient Education: patient educated on goals, PT role and benefits, plan of care, precautions, general safety, functional mobility training  Learning Assessment:  patient verbalizes understanding, would benefit from continued reinforcement    Assessment  Activity Tolerance: Pt tolerated the PT treatment session well however reported increased pain with ambulation, pain medication in place prior to PT arrival  Impairments Requiring Therapeutic Intervention: decreased functional mobility, decreased ROM, decreased strength, decreased safety awareness, decreased cognition, decreased endurance, decreased balance, increased pain  Prognosis: fair  Clinical Assessment: Pt presents today with good response to therapy services. Pt able to increase ambulation distance today without any physical assistance however was limited by R knee pain. Pt continues to demonstrate impaired safety awareness and requires verbal cues for completing safe transfers and ambulation. Due to pts lack of social support, rehab in an inpatient setting is recommended.  Pt will continue to benefit from skilled PT to facilitate return to PLOF and to promote independence.    Safety Interventions: patient left in bed, bed alarm in place, call light within reach and nurse notified- pt declined the use of the gait belt, stating \"that's not necessary\"    Plan  Frequency: 7 x/week BID tx  Current Treatment Recommendations: strengthening, balance training, functional mobility training, transfer training, gait training, stair training, endurance training and patient/caregiver education    Goals  Patient Goals: to return home   Short Term Goals:  Time Frame: by dc  Patient will complete bed mobility at modified independent   Patient will complete transfers at modified independent   Patient will ambulate 100 ft with use of rolling walker at stand by assistance- met 06/30/22  Patient will ascend/descend 3 stairs with (B) handrail at contact guard assistance  Patient will ambulate 100 ft with use of rolling walker at modified independent  *One goal met 6/30/22, ambulation goal updated    Therapy Session Time     First Session Therapy Time:   Individual Concurrent Group Co-treatment   Time In 0853         Time Out 0928         Minutes 35           Timed Code Treatment Minutes: 35 Minutes     Second Session Therapy Time:   Individual Concurrent Group Co-treatment   Time In 1419         Time Out 1454         Minutes 35           Timed Code Treatment Minutes: 35 + 35 Minutes  Total Treatment Minutes: 70 Minutes      Electronically Signed By: Aayush Bustillos, PT  Zofia Friend PT, DPT [id number]

## 2022-06-30 NOTE — DISCHARGE INSTRUCTIONS
medicine wears off. It will eventually subside. Take your prescribed pain medication as directed. If you need more pain medicine, it is your responsibility to call the office during office hours and request them. Use your ice pack for at least 20 minutes every hour after exercise, for swelling and for pain relief.  Your pain pills must last the length of the prescription, typically seven days. Please take the lowest dose possible to manage your pain.  Never drive while taking pain medicine.  Do not take sleep aids while taking narcotic pain medication    Call the doctor for - signs of infection: fever above 101.0 degrees, redness or drainage from the incision. You should look at your incision daily. Pain unrelieved by pain medications, calf pain, tenderness or swelling, questions or problems. Adverse reaction to medication, temperature above 101.0 degrees x2, 3-4 hours apart - any questions. Exercises: Continue your exercises as instructed by physical therapy. You will need to set up an appointment with physical therapy as specified by your doctor. Use your ice pack for 20 minutes after exercise or for additional pain relief. Wear the white EDNA hose for 2 weeks. May remove at night. If they become soiled, wash with soap and water and let them air dry. You will need to set up an appointment with physical therapy as specified by your doctor. If you have required the use of insulin to control your blood sugar after surgery, follow up with your family doctor. Try to walk 5-10 minutes every hour you are awake. Ambulation is very important for your recovery and helps prevent pneumonia, blood clots and joint stiffness/pain. Regarding Blood sugars if Diabetic:  Injection of steroids may cause a temporary increase in your blood sugar. Monitor frequently over the next few days. Contact the physician who manages your diabetes if there is a significant increase above your normal range. No Smoking:  Information regarding smoking cessation and/or risk factors associated with tobacco products given. Smoking cessation assistance can be obtained from your family doctor or by calling [place] @ [phone]    Additional Instructions: Inform your dentist that you had a joint replacement. You may need to be on an antibiotic before any dental work. Continue using your Incentive Spirometer 10 times every hour while awake for the next couple of days after surgery. Constipation: is the most frequent and most uncomfortable side effect of the use of pain medications. If untreated, constipation can lead to a bowel obstruction and pain. With the onset therapy, laxatives, stool softeners and fluids should be taken to avoid constipation. The following protocol may be used to prevent or treat constipation. · Be sure to drink plenty of fluids. Increase your fiber intake. Increase your activity. · Begin taking a stool softener or gentle laxative such as Colace (Ducosate Sodium), Senokot (Senna Concentrate) or Anna-Colace (Casanthranol) 2-3 times per day. · If you have no bowel movement within 48 hours, take 2 to 4 tablespoons of magnesium citrate 1-2 times per day OR 2 to 3 tablets of Dulcolax (Bisacodyl) 3 times per day OR a nighttime dose of 2 to 3 tablets of Senokot. · If you still have no bowel movement by 72 hours, check for impaction. An impaction may be cleared manually after softening with a glycerin suppository or oil-retention enema, followed by enemas until clear. · If no results from above, call your physician. Nerve Block: Protect extremity after nerve block administration. Do not try to use extremity while nerve block is working as you may damage your extremity or cause further injuries. Caution when ambulating (walking). Do not bend over. Your  extremity will have weakness/numbness/tingling until the nerve block has completely worn off.  On average, nerve blocks last approximately 18 hours, however it is different for everyone and may wear off in the middle of the night. Therefore, it is recommended that you take pain medication (with a small snack and full glass of water) prior to going to bed the first night  after your procedure- before the nerve block can potentially wear off. As always, take medications as prescribed      Anesthesia:  · If you had anesthesia today, do not drive, do not sign legal documents or make important decisions. · Start with a light meal and advanced as tolerated. Sometimes people get nauseous on the day of surgery, so please avoid heavy, spicy, or greasy foods. · If vomiting becomes persistent, please call your surgeon's office. · It is normal to experience a sore throat, dry mouth, muscle aches, dizziness, and sleepiness. · For sore throats, we recommend salt water gargles or lozenges. This should improve with time. · A responsible adult should be with you for the next 24-48 hours. · Do Not Drink Alcohol while taking your blood thinner or narcotic pain medications  · Wear your seatbelt home. · Expect 2-4 weeks of driving restrictions    You will be getting a survey in the mail or email about your hospital stay. We want to encourage you to fill it out and let us know how we provided you excellent care! Please contact Mirian Villanueva or the Orthopaedic office with any questions or concerns after your discharge. If you have any issues or concerns after hours please call your orthopaedic office to reach the Surgeon on call first. Please contact your pharmacy for a prescription refill request first. It may take 1-2 business days to process. Heber Rand Nurse Navigator: Mon-Fri [address] [place] [address]    [phone]  [facility] Patient Hotline            [phone]      If you have a medical emergency please call 911 or seek immediate medical help.   [facility]  [address] [facility] [address] [facility], [address] [place] [address]  [phone]

## 2022-06-30 NOTE — CONSULTS
Patient: Jerolyn Fleischer  [id number]  Date: 6/30/2022      Chief Complaint: Right knee pain    History of Present Illness/Hospital Course:  70-year-old male with a history of CAD, depression, PTSD, chronic pain on methadone, and osteoarthritis who was admitted on 6/29 for a right total knee arthroplasty. This procedure was uncomplicated. His postoperative course has been remarkable for significant pain. He was evaluated by therapy and suggested to continue in an inpatient setting prior to returning home. He states he lives with his son in a two-level home with no ability to live on the first floor. He is strongly interested in recovering to be able to have his left knee replaced in the near future. Prior Level of Function:  Independent    Current Level of Function:  CGA     has a past medical history of Anxiety, CAD (coronary artery disease), Degenerative arthritis of knee, bilateral, Depression, GERD (gastroesophageal reflux disease), Opioid dependence with opioid-induced mood disorder ([facility] [address] and PTSD (post-traumatic stress disorder). has a past surgical history that includes knee surgery (Bilateral); back surgery; Rotator cuff repair; other surgical history (Right, 03/14/2019); arthrodesis (Right, 3/14/2019); and Total knee arthroplasty (Right, 6/29/2022). reports that he has been smoking cigarettes. He has a 48.00 pack-year smoking history. He has never used smokeless tobacco. He reports current alcohol use. He reports that he does not use drugs. family history is not contributory      REVIEW OF SYSTEMS:   CONSTITUTIONAL: negative for fevers, chills, diaphoresis, appetite change, night sweats and unexpected weight change. HEENT: negative for hearing loss, tinnitus, ear drainage, sinus pressure, nasal congestion, epistaxis and snoring. RESPIRATORY: Negative for hemoptysis, cough, sputum production.    CARDIOVASCULAR: negative for chest pain, palpitations, exertional chest pressure/discomfort, edema, syncope. GASTROINTESTINAL: negative for nausea, vomiting, diarrhea, constipation, blood in stool and abdominal pain. GENITOURINARY: negative for frequency, dysuria, urinary incontinence, decreased urine volume, and hematuria. HEMATOLOGIC/LYMPHATIC: negative for easy bruising, bleeding and lymphadenopathy. ALLERGIC/IMMUNOLOGIC: negative for recurrent infections, angioedema, anaphylaxis and drug reactions. ENDOCRINE: negative for weight changes and diabetic symptoms including polyuria, polydipsia and polyphagia. MUSCULOSKELETAL: negative for pain, joint swelling, decreased range of motion and muscle weakness. NEUROLOGICAL: negative for headaches, slurred speech, unilateral weakness. PSYCHIATRIC/BEHAVIORAL: negative for hallucinations, behavioral problems, confusion and agitation. All pertinent positives are noted in the HPI. Physical Examination:  Vitals: Patient Vitals for the past 24 hrs:   BP Temp Temp src Pulse Resp SpO2   06/30/22 0800 (!) 148/78 97.6 °F (36.4 °C) Oral 57 16 96 %   06/30/22 0358 127/62 98.2 °F (36.8 °C) Oral 62 16 97 %   06/30/22 0002 138/69 98.6 °F (37 °C) Oral 63 16 96 %   06/29/22 2030 (!) 154/78 97.7 °F (36.5 °C) Oral 65 16 94 %   06/29/22 1725 -- -- -- -- 18 --   06/29/22 1631 (!) 148/74 -- -- 57 16 --   06/29/22 1315 (!) 155/87 98 °F (36.7 °C) Oral 69 16 95 %   06/29/22 1247 (!) 153/87 98 °F (36.7 °C) Oral 61 14 92 %   06/29/22 1215 (!) 150/82 97.5 °F (36.4 °C) Oral 68 12 95 %     Psych: Stable mood, normal judgement, normal affect. Const: No distress  Eyes: Conjunctiva noninjected, no icterus noted; pupils equal, round. HENT: Atraumatic, normocephalic; Oral mucosa moist  Neck: Trachea midline, neck supple. No thyromegaly noted. CV: No audible murmurs  Resp: No increased WOB, no audible wheezing   GI: Nondistended   Neuro: Alert, oriented, appropriate. Skin: No visible abnormalities  MSK: Right knee in Ace dressing  Ext: No significant edema appreciated. No varicosities. Lab Results   Component Value Date    WBC 7.0 07/28/2017    HGB 14.0 07/28/2017    HCT 42.6 07/28/2017    MCV 91.4 07/28/2017     (L) 07/28/2017     No results found for: INR, PROTIME  Lab Results   Component Value Date    CREATININE 0.7 07/28/2017    BUN 11 07/28/2017     07/28/2017    K 4.2 07/28/2017     07/28/2017    CO2 21 (L) 07/28/2017     Lab Results   Component Value Date    ALT 23 07/28/2017    AST 21 07/28/2017    ALKPHOS 100 07/28/2017    BILITOT 0.6 07/28/2017       Most recent imaging studies revealed      Reason for exam:->Assess implants. Reason for Exam: Assess implants.       FINDINGS:   Perioperative examination of the right knee has been obtained in this patient   status post right knee arthroplasty.  As visualized, orthopedic hardware is   in good position.  Focal lucencies about the distal femur and proximal tibia   likely related to tracks associated with orthopedic device utilized for the   procedure which has subsequently been removed. Lum Forte is identified about the   knee related to the procedure.  Arterial vascular calcification is identified   about the lower extremity.           Impression   Status post right knee arthroplasty.             The above laboratory data have been reviewed. The above imaging data have been reviewed. The above medical testing have been reviewed. Body mass index is 32.28 kg/m². Assessment and Plan:  Right knee osteoarthritis: S/p TKA on 6/29 with Dr. Sarah Juarez. WBAT. Pain control. PT/OT  Chronic pain: On methadone, Dilaudid added for breakthrough pain. CAD  PTSD  Anx/DEP    Dispo: Patient is an appropriate ARU candidate. Able to tolerate the required 3 hours of therapy in an ARU setting. Able to admit today. Orders placed for transfer. Thank you for the consultation. Edda Hayes MD 6/30/2022, 11:55 AM     * This document was created using dictation software.   While all precautions were taken to ensure accuracy, errors may have occurred. Please disregard any typographical errors.

## 2022-06-30 NOTE — DISCHARGE INSTR - COC
disorder ([facility] [address] F60.3    Low back pain M54.50    Chronic obstructive pulmonary disease (HCC) J44.9    Nicotine dependence F17.200    Opioid dependence ([facility]) F11.20    Nondependent alcohol abuse, in remission F10.11    Other and unspecified hyperlipidemia E78.5    Therapeutic opioid induced constipation K59.03, T40.2X5A    Vitamin D deficiency E55.9    Calculus of kidney N20.0    Carpal tunnel syndrome, right upper limb G56.01    Cholelithiasis K80.20    Chronic ischemic heart disease I25.9    Contact dermatitis and other eczema L25.9    Contusion of toe S90.129A    Dental caries on pit and fissure surface penetrating into pulp K02.53    Dental caries on smooth surface penetrating into pulp K02.63    Dental root caries K02.7    Dysphonia R49.0    Dyspnea, unspecified R06.00    Epigastric pain R10.13    Gastroesophageal reflux disease K21.9    Generalized osteoarthritis M15.9    Hematuria syndrome R31.9    Homeless single person Z59.00    Intracranial injury of other and unspecified nature S06. 9X9A    Pain in unspecified knee M25.569    Moderate episode of recurrent major depressive disorder (HCC) F33.1    Muscle weakness (generalized) M62.81    Neck pain M54.2    Neoplasm of uncertain behavior of skin D48.5    Other ill-defined and unknown causes of morbidity and mortality R69    Osteoarthrosis involving lower leg [id number]    Other specified forms of hearing loss H91.8X9    Overweight E66.3    Pain, unspecified R52    Pneumonia, unspecified organism J18.9    Primary localized osteoarthrosis, lower leg M17.10    Primary osteoarthritis of both knees M17.0    Prolonged QT interval R94.31    Recurrent major depressive disorder (HCC) F33.9    Sensorineural hearing loss, bilateral H90.3    Tobacco use disorder F17.200    Suicidal thoughts R45.851    Posttraumatic stress disorder F43.10    Primary osteoarthritis of right knee M17.11       Isolation/Infection:   Isolation            No Isolation          Patient Infection unknown    Intake/Output Summary (Last 24 hours) at 6/30/2022 0838  Last data filed at 6/30/2022 0002  Gross per 24 hour   Intake 1320 ml   Output 1050 ml   Net 270 ml     I/O last 3 completed shifts: In: 1320 [P.O.:120; I.V.:1200]  Out: 1050 [Urine:1000; Blood:50]    Safety Concerns: At Risk for Falls    Impairments/Disabilities:      None    Nutrition Therapy:  Current Nutrition Therapy:   - Oral Diet:  General    Routes of Feeding: Oral  Liquids: No Restrictions  Daily Fluid Restriction: no  Last Modified Barium Swallow with Video (Video Swallowing Test): not done    Treatments at the Time of Hospital Discharge:   Respiratory Treatments: incentive spirometer -10x every hour while awake for 5 days post-op  Oxygen Therapy:  is not on home oxygen therapy. Ventilator:    - No ventilator support    Rehab Therapies: Physical Therapy and Occupational Therapy  Weight Bearing Status/Restrictions: No weight bearing restrictions  Other Medical Equipment (for information only, NOT a DME order):  walker  Other Treatments: Wound Care:   - Remove ace wrap and cotton roll POD #1  - Only change dressing if over 75% saturated with drainage. Use extra dressing that was given to you. - Keep incision dry. Leave the tegederm and gauze dressing in place until it comes off - usually around 12-14 days. You may shower with the tegederm. Once the dressing is off, you may continue to shower as long as there is no drainage. Most likely you will have a tissue adhesive surgical glue over the incision - Dermabond Primeo (dermabond mesh) -  After 3 weeks vaseline to be applied to surgical site to remove dermabond tape. This is done by rolling the tape top to bottom or bottom to top-- NOT side to side. Call the office with questions  - KEEP PETS AWAY FROM YOUR INCISION. - Use incentive spirometer for the next 5 days, 10x every hour while awake. - Please set up to see your physical therapist to be seen within 1-2 business days. Therapy is very important to your success after surgery. - Continue use of cooling pad/ice pack as needed for pain. - Wear compression hose during the day until your first post op office appointment. - Do only those exercises prescribed by your therapist while in the hospital.    - Prevent constipation by drinking plenty of water and using stool softeners/laxatives as needed. Start DVT prophylaxis tonight. Make follow up appt in 2 weeks. Patient's personal belongings (please select all that are sent with patient):  None    RN SIGNATURE:  Electronically signed by Prachi Ryan RN on 6/30/22 at 8:40 AM EDT    CASE MANAGEMENT/SOCIAL WORK SECTION    Inpatient Status Date: 6/29/2022    Readmission Risk Assessment Score:  Readmission Risk              Risk of Unplanned Readmission:  8           Discharging to Facility/ Agency   Name: [facility] [address] [place] [address]  Address: Brian [address] [place] [address]  Phone: [phone]  Fax: [phone]      / signature: Electronically signed by Prachi Ryan RN on 6/30/22 at 8:41 AM EDT    PHYSICIAN SECTION    Prognosis: Good    Condition at Discharge: Stable    Rehab Potential (if transferring to Rehab): Good    Recommended Labs or Other Treatments After Discharge: Physical and occupational therapy    Physician Certification: I certify the above information and transfer of Rodolfo Bai  is necessary for the continuing treatment of the diagnosis listed and that he requires Acute Rehab for less 30 days.      Update Admission H&P: No change in H&P    PHYSICIAN SIGNATURE: Dr. Kate Castrejon 6/30/2022

## 2022-06-30 NOTE — PLAN OF CARE
Problem: Pain  Goal: Verbalizes/displays adequate comfort level or baseline comfort level  Outcome: Progressing  Problem: Discharge Planning  Goal: Discharge to home or other facility with appropriate resources  Outcome: Progressing

## 2022-06-30 NOTE — CARE COORDINATION
Patient was approved for admission to [address] [place] [address]. Patient to be transferred to ARU later today.      Discharge Plan:  [address] [place] [address]    Electronically signed by KARSTEN Chavez, PAYTON on 6/30/2022 at 12:09 PM
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
## 2022-06-30 NOTE — PROGRESS NOTES
Department of Orthopedic Surgery  Joint Service  Attending Progress Note        Subjective:  No complaints this morning, reviewed staff notes and he remains impulsive and does get loud / aggressive with staff at times. He is pleased with his surgical results thus far and did not focus on pain during discussion he is hopeful for ARU discharge due to social situation and need to be independent with ADLs rapidly. Vitals  VITALS:  /62   Pulse 62   Temp 98.2 °F (36.8 °C) (Oral)   Resp 16   Ht 5' 9.5\" (1.765 m)   Wt 221 lb 12.8 oz (100.6 kg)   SpO2 97%   BMI 32.28 kg/m²     PHYSICAL EXAM:    Orientation: alert and needing to get out of bed for the bathroom, NAD    Respirations: even and unlabored    Right Lower Extremity    Incision:  dressing in place, clean, dry and intact    Lower Extremity Motor :   Quadriceps:  4/5  Extensor hallucis:  5/5  Dorsiflexion:  5/5  Plantarflexion:  5/5    Lower Extremity Sensory:  Tibial Nerve:  intact  Superficial Peroneal Nerve:  intact  Deep Peroneal Nerve:  intact    Pulses:    Dorsalis Pedis:  2      LABS:    Pending    ASSESSMENT AND PLAN:    Post operative day 1 status post right total knee arthroplasty with robotic assistance    1:  Weight bearing as tolerated  2:  Deep venous thrombosis prophylaxis - Lovenox to start this am  3:  physical therapy / OT. I reinforced the issues of fall risk and the need for assistance with ambulation. Also reminded the patient that the staff is here to help him and he should do his best to treat them with respect and courtesy. 4:  D/C Plan: Will benefit from acute rehab due to lack of social support  5:  Pain Control:  Resume his methadone, dilaudid for breakthrough pain. Will add toradol scheduled today as his blocks will be wearing off.

## 2022-06-30 NOTE — PROGRESS NOTES
Shift assessment completed, pt is alert and oriented, VSS, fall precautions in place, call light within reach, neuro checks WNL, see flowsheets and MAR, will monitor pt. The care plan and education has been reviewed and mutually agreed upon with the patient. 0410: Pt is sitting up in chair, sitting at the edge of the chair, refusing to sit all the way up. Pt is educated regarding fall risk. Pt still refusing and getting agitated, charge RN is notified, chair alarm on, call light within reach, will monitor pt.    0600: Pt is still sitting at the edge of the bed, refusing to sit all the way up in chair or return to bed. Chair alarm on, call light within reach, will monitor pt.

## 2022-06-30 NOTE — CARE COORDINATION
Patient has been accepted to ARU. Dr. Jen Perea notified for discharge orders.   Patient and POA (SonLauryn) notified [phone]
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
## 2022-06-30 NOTE — PROGRESS NOTES
Hospitalist Progress Note      PCP: No primary care provider on file. Date of Admission: 6/29/2022    Chief Complaint: Knee pain    Hospital Course: Admitted to the knee pain    Subjective: The pain is well controlled. Patient does not believe he has coronary artery disease even the stent was placed at [address] [place] [address].  Takes aspirin intermittently      Medications:  Reviewed    Infusion Medications    sodium chloride       Scheduled Medications    ketorolac  15 mg IntraVENous TID    ascorbic acid  500 mg Oral Daily    methadone  20 mg Oral Daily    Vitamin D  1,000 Units Oral TID    sodium chloride flush  5-40 mL IntraVENous 2 times per day    acetaminophen  650 mg Oral 4 times per day    bisacodyl  5 mg Oral Daily    enoxaparin  40 mg SubCUTAneous Daily    pantoprazole  40 mg Oral QAM AC     PRN Meds: meperidine, HYDROmorphone, labetalol **OR** hydrALAZINE, sodium chloride flush, sodium chloride, HYDROmorphone **OR** HYDROmorphone, HYDROmorphone **OR** HYDROmorphone, hydrOXYzine HCl, magnesium hydroxide, fleet, ondansetron **OR** ondansetron      Intake/Output Summary (Last 24 hours) at 6/30/2022 1749  Last data filed at 6/30/2022 0002  Gross per 24 hour   Intake --   Output 1000 ml   Net -1000 ml       Physical Exam Performed:    BP (!) 149/79   Pulse 54   Temp 98.8 °F (37.1 °C) (Oral)   Resp 16   Ht 5' 9.5\" (1.765 m)   Wt 221 lb 12.8 oz (100.6 kg)   SpO2 96%   BMI 32.28 kg/m²     General appearance: No apparent distress, appears stated age and cooperative. HEENT: Pupils equal, round, and reactive to light. Conjunctivae/corneas clear. Neck: Supple, with full range of motion. No jugular venous distention. Trachea midline. Respiratory:  Normal respiratory effort. Clear to auscultation, bilaterally without Rales/Wheezes/Rhonchi. Cardiovascular: Regular rate and rhythm with normal S1/S2 without murmurs, rubs or gallops.   Abdomen: Soft, non-tender, non-distended with normal bowel sounds. Musculoskeletal: Right knee surgical site noted. Skin: Skin color, texture, turgor normal.  No rashes or lesions. Neurologic:  Neurovascularly intact without any focal sensory/motor deficits. Cranial nerves: II-XII intact, grossly non-focal.  Psychiatric: Alert and oriented, thought content appropriate, normal insight  Capillary Refill: Brisk,3 seconds, normal   Peripheral Pulses: +2 palpable, equal bilaterally       Labs:   Recent Labs     06/30/22  1209   WBC 6.7   HGB 9.2*   HCT 28.4*        Recent Labs     06/30/22  1209   *   K 4.3      CO2 24   BUN 16   CREATININE 0.6*   CALCIUM 8.1*     No results for input(s): AST, ALT, BILIDIR, BILITOT, ALKPHOS in the last 72 hours. No results for input(s): INR in the last 72 hours. No results for input(s): [facility] in the last 72 hours. Urinalysis:      Lab Results   Component Value Date/Time    NITRU Negative 07/28/2017 01:15 PM    BLOODU Negative 07/28/2017 01:15 PM    SPECGRAV 1.020 07/28/2017 01:15 PM    GLUCOSEU Negative 07/28/2017 01:15 PM       Radiology:  XR KNEE RIGHT (1-2 VIEWS)   Preliminary Result   Status post right knee arthroplasty. Assessment/Plan:    Active Hospital Problems    Diagnosis     Primary osteoarthritis of right knee [M17.11]      Priority: Medium        Right knee osteoarthritis status post right knee arthroplasty: Continue postop care per Ortho     GERD: PPI     Chronic methadone dependence: Continue methadone     CAD status post stenting: Patient does not believe he had a coronary artery disease and does not take aspirin regularly.        DVT Prophylaxis: Lovenox   diet: ADULT DIET; Regular  Code Status: Full Code    PT/OT Eval Status: Nursing facility    Dispo -Per primary team.  Will sign off.     Tye Lopes MD

## 2022-06-30 NOTE — PROGRESS NOTES
Dr. Rajiv Ramos in to see patient at this time. This RN inquired about safety precautions and use of alarms for patient as patient very upset with lack of independence and use of alarms. Per Dr. Rajiv Ramos, \"It's okay for patient not to have alarms in place. \"

## 2022-07-01 ENCOUNTER — CARE COORDINATION (OUTPATIENT)
Dept: CASE MANAGEMENT | Age: 72
End: 2022-07-01

## 2022-07-01 VITALS
DIASTOLIC BLOOD PRESSURE: 84 MMHG | TEMPERATURE: 98 F | SYSTOLIC BLOOD PRESSURE: 144 MMHG | BODY MASS INDEX: 31.75 KG/M2 | RESPIRATION RATE: 18 BRPM | HEIGHT: 70 IN | HEART RATE: 56 BPM | WEIGHT: 221.8 LBS | OXYGEN SATURATION: 96 %

## 2022-07-01 PROCEDURE — 6360000002 HC RX W HCPCS: Performed by: ORTHOPAEDIC SURGERY

## 2022-07-01 PROCEDURE — 97110 THERAPEUTIC EXERCISES: CPT

## 2022-07-01 PROCEDURE — 97116 GAIT TRAINING THERAPY: CPT

## 2022-07-01 PROCEDURE — 94760 N-INVAS EAR/PLS OXIMETRY 1: CPT

## 2022-07-01 PROCEDURE — 97530 THERAPEUTIC ACTIVITIES: CPT

## 2022-07-01 PROCEDURE — 2580000003 HC RX 258: Performed by: ORTHOPAEDIC SURGERY

## 2022-07-01 PROCEDURE — 6370000000 HC RX 637 (ALT 250 FOR IP): Performed by: ORTHOPAEDIC SURGERY

## 2022-07-01 RX ADMIN — METHADONE HYDROCHLORIDE 20 MG: 10 TABLET ORAL at 08:35

## 2022-07-01 RX ADMIN — HYDROMORPHONE HYDROCHLORIDE 0.5 MG: 1 INJECTION, SOLUTION INTRAMUSCULAR; INTRAVENOUS; SUBCUTANEOUS at 12:41

## 2022-07-01 RX ADMIN — ENOXAPARIN SODIUM 40 MG: 100 INJECTION SUBCUTANEOUS at 08:36

## 2022-07-01 RX ADMIN — HYDROMORPHONE HYDROCHLORIDE 2 MG: 2 TABLET ORAL at 02:26

## 2022-07-01 RX ADMIN — ACETAMINOPHEN 650 MG: 325 TABLET ORAL at 06:20

## 2022-07-01 RX ADMIN — Medication 1000 UNITS: at 08:35

## 2022-07-01 RX ADMIN — Medication 1000 UNITS: at 13:13

## 2022-07-01 RX ADMIN — ACETAMINOPHEN 650 MG: 325 TABLET ORAL at 13:14

## 2022-07-01 RX ADMIN — HYDROMORPHONE HYDROCHLORIDE 2 MG: 2 TABLET ORAL at 06:20

## 2022-07-01 RX ADMIN — SODIUM CHLORIDE, PRESERVATIVE FREE 10 ML: 5 INJECTION INTRAVENOUS at 08:36

## 2022-07-01 RX ADMIN — KETOROLAC TROMETHAMINE 15 MG: 15 INJECTION, SOLUTION INTRAMUSCULAR; INTRAVENOUS at 15:37

## 2022-07-01 RX ADMIN — HYDROMORPHONE HYDROCHLORIDE 2 MG: 2 TABLET ORAL at 09:52

## 2022-07-01 RX ADMIN — OXYCODONE HYDROCHLORIDE AND ACETAMINOPHEN 500 MG: 500 TABLET ORAL at 08:35

## 2022-07-01 RX ADMIN — BISACODYL 5 MG: 5 TABLET, COATED ORAL at 08:35

## 2022-07-01 RX ADMIN — PANTOPRAZOLE SODIUM 40 MG: 40 TABLET, DELAYED RELEASE ORAL at 06:20

## 2022-07-01 RX ADMIN — ACETAMINOPHEN 650 MG: 325 TABLET ORAL at 02:26

## 2022-07-01 RX ADMIN — KETOROLAC TROMETHAMINE 15 MG: 15 INJECTION, SOLUTION INTRAMUSCULAR; INTRAVENOUS at 08:36

## 2022-07-01 ASSESSMENT — PAIN DESCRIPTION - LOCATION
LOCATION: KNEE

## 2022-07-01 ASSESSMENT — PAIN SCALES - GENERAL
PAINLEVEL_OUTOF10: 8
PAINLEVEL_OUTOF10: 6
PAINLEVEL_OUTOF10: 9
PAINLEVEL_OUTOF10: 7
PAINLEVEL_OUTOF10: 8
PAINLEVEL_OUTOF10: 7
PAINLEVEL_OUTOF10: 6

## 2022-07-01 ASSESSMENT — PAIN DESCRIPTION - ORIENTATION
ORIENTATION: RIGHT

## 2022-07-01 ASSESSMENT — PAIN DESCRIPTION - DESCRIPTORS
DESCRIPTORS: ACHING
DESCRIPTORS: SORE

## 2022-07-01 NOTE — PROGRESS NOTES
Report given and reported patient taking methadone and is [facility]

## 2022-07-01 NOTE — PROGRESS NOTES
1945: talked with pt extensively r/e POC, pt expressed he was upset with care he had received from previous shift, also upset he is no longer accepted to rehab, discussed other option with pt and reminded him he will still receive rehab at theses other facilities, pt suffers from PTSD and is not on any medication of therapy at this time, it is obvious being in the hospital has exacerbated this, offered emotional support, discussed safety precautions, pt adamant he will be safe and not fall, declines alarms, dicussed proper body alignment and importance of keeping knee straight while in bed, pt has RLE elevated on 2 pillows under the ankle, discused medications, pt mutually agrees, requests to not be woken up for VS, and for care to be clustered, call light and belongings in reach, pt satisfied with care at this time, will continue monitoring. 2130: Assessment complete, VSS, NV checks WNL, BP has decreased some, pt more calm, still upsets easily, r/e politic on TV, and past experiences, continued to offer emotional support and let pt talk through issues, gave dilaudid PO for pain, ice packs applied to R knee, atarax given to help sleep/calm nerves, see MAR, R knee silver dressing in place, moderate amt of serosanguinous drainage noted, small area below dressing draining serosanguinous fluid, covered with dry dressing, pt reports was a previous \"scratch\" from home, no other needs at this time will continue monitoring. 0130: pt sleeping, will continue monitoring. 0300: pt having increased R knee pain, gave dilaudid and scheduled tylenol, see MAR, applied EDNA hose to RLE, ice applied to R knee, elevated on pillows under the ankle, pt calm at this time and agreeable to all care at this time.     Nelsy Osorio, RN

## 2022-07-01 NOTE — PROGRESS NOTES
Department of Orthopedic Surgery  Joint Service  Attending Progress Note        Subjective: Events of yesterday afternoon noted and a loud NO developed into a code violet situation. He was in a chair for approximately 90 minutes and becoming uncomfortable / in pain and needed to change position, which triggered the bed alarm. When staff entered the room they immediately ordered him to sit back and his loud verbal refusal due to the pain resulted in the perceived threat. No need for sedation was noted by the responding physician and patient had an uneventful night despite being quite concerned as to where he will be able to go for rehab due to Dr. Christie Candelaria cancelling his admission. He is calm this morning and worried about potentially compromising his recovery. Vitals  VITALS:  BP (!) 144/84   Pulse 56   Temp 98 °F (36.7 °C) (Oral)   Resp 18   Ht 5' 9.5\" (1.765 m)   Wt 221 lb 12.8 oz (100.6 kg)   SpO2 96%   BMI 32.28 kg/m²     PHYSICAL EXAM:    Orientation: Alert, calm and in NAD    Respirations: even and unlabored    Right Lower Extremity    Incision:  dressing in place, minimal staining    Lower Extremity Motor :   Quadriceps:  4/5  Extensor hallucis:  5/5  Dorsiflexion:  5/5  Plantarflexion:  5/5    Lower Extremity Sensory:  Tibial Nerve:  intact  Superficial Peroneal Nerve:  intact  Deep Peroneal Nerve:  intact    Pulses:    Dorsalis Pedis:  2      LABS:    Hgb 9.2 6/30    ASSESSMENT AND PLAN:    Post operative day 2 status post right total knee arthroplasty with robotic assistance    1:  Weight bearing as tolerated with walker  2:  Deep venous thrombosis prophylaxis - Lovenox   3:  physical therapy / OT. I reinforced the issues of fall risk and the need for assistance with ambulation. Also reminded the patient that the staff is here to help him and he should do his best to treat them with respect and courtesy. 4:  D/C Plan:   Will benefit from acute rehab due to lack of social support- awaits placement   5:  Pain Control:  Resume his methadone, dilaudid for breakthrough pain.      6: Acute blood loss anemia secondary to surgery, asymptomatic

## 2022-07-01 NOTE — PROGRESS NOTES
Bearing Restrictions: weight bearing as tolerated  [] Right Upper Extremity  [] Left Upper Extremity [] Right Lower Extremity  [] Left Lower Extremity     Required Braces/Orthotics: no braces required   [] Right  [] Left  Positional Restrictions:no positional restrictions    Pre-Admission Information   Lives With: son                    Type of Home: house  Home Layout: two level, bedroom/bathroom upstairs - states he has been crawling up/down steps. Home Access: 4 step to enter without rails    Bathroom Layout: tub/shower unit  - has a board over tub so he can sit  Toilet Height: standard height  Bathroom Equipment: none  Home Equipment: single point cane  Transfer Assistance: Independent without use of device  Ambulation Assistance:Independent without use of device  ADL Assistance: independent with all ADL's  IADL Assistance: independent with homemaking tasks  Active :        [x]? Yes            []? No  Hand Dominance: []? Left            []? Right  Current Employment: retired. Hobbies:   Recent Falls: reports 2 \"bad\" falls in the last 6 months, states fell down steps to gain entrance into home - states 1 fall resulted in torn rotator cuff. States he is unable to go to son's home as they have 8-12 inch steps without rails to gain entrance into home. Also reports poor relationship with daughter-in-law, stating \"I would rather go to hotel\"  Examination   Vision:   Vision Gross Assessment: WFL  Hearing:   WFL  Posture: Forward head; rounded shoulders  Sensation:   WFL  ROM:   (L) Shoulder: limited past 95     (R) Shoulder: limited past 95  Strength:   (B) UE strength grossly -3    Decision Making: medium complexity  Clinical Presentation: stable      Subjective  General: Pt supine in bed upon entry. He does not rate pain upon questioning. Pt resistant to OT education in wearing non-skid socks for safety, stating, \"there you go again with this insist ance that I just don't want to put up with\".  -however, pt placated with max encouragement to wear socks for durration of session. --cotx PT/OT due to behaviors  Note: pt declines to wear gait belt despite maximum encouragement and education in safety during transfers and ambulation. Pain: Patient does not rate upon questioning  Pain Interventions: patient denies pain interventions--offered ice; however, pt states that ice does not help his knee swelling         Activities of Daily Living  Basic Activities of Daily Living  Lower Extremity Dressing: maximum assistance . Comment: OTR provided pt with wearing shoes vs. non-skid socks. Pt grew agitated due to feeling limited by dicotomy of options. However he is only agreeable to max A to thread non skid socks. Pt stopped OTR while donning socks Max A and requesting to leave them partially donned. --At later time during session, pt insited that his socks were donned incorrectly when PT suggested to adjust during car transfer. .  Equipment: none  Instrumental Activities of Daily Living  No IADL completed on this date. Functional Mobility  Bed Mobility  Supine to Sit: supervision  Sit to Supine: supervision  Comments:  Transfers  Sit to stand transfer:supervision  Stand to sit transfer: supervision  Car transfer: supervision  Comments: declines gait belt  Functional Mobility:  Sitting Balance: Independent. Standing Balance: supervision. Functional Mobility: rolling walker. supervision, . Comment Pt ambulated 198ft +143ft in unit hallway with good activity tolerance. Pt additionally negotiated x5 steps ascending and descending using reciprical method agianst therapy advised gait (i.e step to \"up with good leg/down with surgical leg\"). .  Activity: to/from therapy room    Other Therapeutic Interventions    Functional Outcomes  AM-PAC Inpatient Daily Activity Raw Score: 23    Cognition  WFL   --please see subjective section for details regarding poor safety awareness.  Suspect this is personality/behaviorally driven. Orientation:    alert and oriented x 4  Command Following:   [facility]     Education  Barriers To Learning: none  Patient Education: patient educated on goals, OT role and benefits, plan of care, precautions, proper use of assistive device/equipment, transfer training, discharge recommendations, Increased time required for all education secondary to pt argumentative tendencies with need for constant redirection to tasks/activities. Learning Assessment:  patient resistive towards education topics within session, would benefit from continued education    Assessment  Activity Tolerance: Pt tolerated treatment well  Impairments Requiring Therapeutic Intervention: decreased functional mobility, decreased ADL status, decreased ROM, decreased strength, decreased safety awareness, decreased endurance, decreased balance, increased pain  Prognosis: good  Clinical Assessment: Pt presenting below his functional baseline with the above deficits associated with R TKR. He is limited by weakness, decreased balance/stability, and decreased endurance. Additionally, pt with difficult behaviors and poor insight into his functional deficits. Furthermore, he is resistant to therapy education topics. He also reports he \"has nowhere else to go\" after discharge since his son's home is not conducive to his needs. He states, \"I would rather just go to a hotel\". Continued OT indicated in order to maximize safety and independence with all occupational pursuits. Safety Interventions: patient left in bed, bed alarm in place, call light within reach and nurse notified     Pt RLE propped with additional pillows upon entry. Despite education in surgical protocols to avoid prolonged knee flexion at 30 degrees as this is counterproductive to recover, pt is resistive and insisting continued propping of his RLE at EOS.      Plan  Frequency: 7 x/week  Current Treatment Recommendations: strengthening, ROM, balance training, functional mobility training, transfer training, gait training, stair training, endurance training, modalities, ADL/self-care training, home management training and safety education    Goals  Patient Goals: \"I want to get both my legs back so I can be 25years old again\"   Short Term Goals:  Time Frame: Discharge  All goals ongoing 7/1  Patient will complete upper body ADL at Independent   Patient will complete lower body ADL at Independent   Patient will complete toileting at Independent   Patient will complete grooming at Independent   Patient will complete functional transfers at [address] [place] [address]   Time In    0854   Time Out    0917   Minutes    23       Timed Code Treatment Minutes: 23 Minutes  Total Treatment Minutes:  23 minutes       Electronically Signed By: DALE Ahuja OTR/L  [id number]

## 2022-07-01 NOTE — CARE COORDINATION
Discharge Plan:     Patient discharged to:    [facility] of [address] [place] [address]. Nayely George [facility] [address]    SW/DC Planner faxed, [address] Prasad and LORE to: [phone]    Narcotic Prescriptions faxed were:     RN: will call report to: [phone]     Medical Transport with: [facility] [phone]     time: 2803    Family advised of discharge?: N/A. Patient will notify family    HENS Submitted?:  N/A    All discharge needs met per case management.     FRANKLIN Bonilla RN    [facility]  Phone: [phone]

## 2022-07-01 NOTE — PLAN OF CARE
Problem: Pain  Goal: Verbalizes/displays adequate comfort level or baseline comfort level  7/1/2022 0807 by Sharron Engel RN  Outcome: Progressing  Flowsheets (Taken 7/1/2022 0807)  Verbalizes/displays adequate comfort level or baseline comfort level:   Encourage patient to monitor pain and request assistance   Assess pain using appropriate pain scale   Administer analgesics based on type and severity of pain and evaluate response   Implement non-pharmacological measures as appropriate and evaluate response   Consider cultural and social influences on pain and pain management   Notify Licensed Independent Practitioner if interventions unsuccessful or patient reports new pain  Note: Patient repositions self taking po pain medication prn  7/1/2022 0416 by Shawna Vieira RN  Outcome: Progressing     Problem: Discharge Planning  Goal: Discharge to home or other facility with appropriate resources  7/1/2022 0807 by Sharron Engel RN  Outcome: Progressing  7/1/2022 0416 by Shawna Vieira RN  Outcome: Progressing     Problem: Safety - Adult  Goal: Free from fall injury  7/1/2022 0807 by Sharron Engel RN  Outcome: Progressing  7/1/2022 0416 by Shawna Vieira RN  Outcome: Progressing     Problem: Musculoskeletal - Adult  Goal: Return mobility to safest level of function  Outcome: Progressing  Goal: Maintain proper alignment of affected body part  Outcome: Progressing  Goal: Return ADL status to a safe level of function  Outcome: Progressing     Problem: Infection - Adult  Goal: Absence of infection at discharge  Outcome: Progressing  Goal: Absence of infection during hospitalization  Outcome: Progressing  Goal: Absence of fever/infection during anticipated neutropenic period  Outcome: Progressing

## 2022-07-08 NOTE — DISCHARGE SUMMARY
Physician Discharge Summary     Patient ID:  Kia Rodrigues  [id number]  32 y.o.  1950    Admit date: 6/29/2022    Discharge date and time: 7/1/2022  6:04 PM     Admitting Physician: Janessa Castillo MD     Discharge Physician: Janessa Castillo MD    Admission Diagnoses: Primary osteoarthritis of right knee [M17.11]  Chronic Opoid Dependency  PTSD    Discharge Diagnoses:  Primary osteoarthritis of right knee [M17.11]  Chronic Opoid Dependency  PTSD    Admission Condition: good    Discharged Condition: good    Indication for Admission: Post-op admission following elective right total knee arthroplasty    Hospital Course: Kia Rodrigues was admitted to the medical surgical floor postoperatively. He was continued on postoperative antibiotics for the 1st 24 hours. DVT prophylaxis was initiated on postoperative day #1 with Lovenox. He made steady progress with physical therapy. He was tolerating his usual diet. He was voiding on his own. Arrangements were made for discharge based on physical therapy criteria and social situation to inpatient rehab initially at [facility]. He has an issue of raising his voice directly at staff and a code violet was called, this resulted in his admission to [facility] to be cancelled. He stayed an additional night without incident and was placed for inpatient rehab at an alternate facility. He was medically stable and comfortable with the discharge plan. Consults: Internal Medicine and Inpatient Rehab    Significant Diagnostic Studies: labs: Hgb shows mild post-op blood loss anemia as expected. Post op x-rays show good component position.     Treatments: IV hydration, antibiotics: Ancef, anticoagulation: LMW heparin, therapies: PT, OT and SW and surgery: right total knee arthroplasty    Disposition: Inpatient rehab at [facility] in [facility]      Patient Instructions:   Discharge Medication List as of 7/1/2022  3:11 PM      CONTINUE these medications which have NOT CHANGED    Details   pantoprazole (PROTONIX) 20 MG tablet Take 20 mg by mouth daily Pt unsure of doseHistorical Med      diclofenac sodium 1 % GEL Apply 4 g topically 2 times daily as needed , Topical, 2 TIMES DAILY PRN, Until Discontinued, Historical Med      omeprazole (PRILOSEC) 20 MG delayed release capsule Take 20 mg by mouth Daily Historical Med      methadone (DOLOPHINE) 10 MG tablet Take 10 mg by mouth daily. Historical Med      vitamin D (CHOLECALCIFEROL) 1000 units TABS tablet Take 1,000 Units by mouth 3 times dailyHistorical Med      ascorbic acid (VITAMIN C) 500 MG tablet Take 500 mg by mouth dailyHistorical Med         Hydromorphone 1mg PO q6 hours prn breakthrough pain  Lovenox 40mg sq daily x 14 days    Activity: activity as tolerated with walker and standby assitance  Diet: regular diet  Wound Care: Keep dressing in place, may shower over sealed dressing. Change dressing if it becomes saturated. Follow-up with Dr. Yanira Johnson in 2 weeks. Signed:      Alexander Mena. MD Lucía, [address] [place] [address]  Board Certified Orthopaedic Surgeon    [facility]    [address]   [address]    Phone:[phone]    Fax [phone]

## 2022-07-11 ENCOUNTER — CARE COORDINATION (OUTPATIENT)
Dept: CASE MANAGEMENT | Age: 72
End: 2022-07-11

## 2022-07-11 NOTE — CARE COORDINATION
Spoke with :     Nursing supervisor                                           @ : [facility]. Incision status: States no complications with incision. Pain control: States having pain control issues. Therapies involved: Not participating in therapy. Tolerating:poor. States wants to be placed at a SNF in [address] and has follow up with surgeon tomorrow and will get him placed after that. Barriers to being discharged home: Having issues with being argumentative and not cooperative at times. Projected discharge date:Plan to discharge today, but will do it after MD appointment tomorrow. Discharge needs: Will need further therapy and plans to go to SNF closer to home.       Shara SANDERSN  Care Transition Nurse for ortho bundle  [phone]

## 2022-07-18 ENCOUNTER — CARE COORDINATION (OUTPATIENT)
Dept: CASE MANAGEMENT | Age: 72
End: 2022-07-18

## 2022-07-18 NOTE — CARE COORDINATION
Called [facility] for patient updates. States that patient was discharged on 7/14/22 with [place] [facility]. Spoke with patient. Incision status: States incision has a small amount of drainage. Edema/Swelling: States still has a lot of swelling. Discussed icing and elevating. States he has stan hose to wear but they are too tight and no aware of DME company in the area to get a larger size. Pain level and status: States pain is tolerable. Bowels: No complaints. Home Care Agency active: States home care agency has not started. Imelda Mei @ Dr. Lilia Cortez office to check on home care status. Shweta Campa @ [facility] [place] [facility] @ [phone] ext #[phone]. States that they have not been able to reach patient. Instructed that number in the system is the correct number for the patient. States that they need orders and information related to patient's surgery. Imelda Mei @ Dr. Lilia Cortez office and left a message. Do you have all of your medications: Yes    Changes in medications: No      Received call from Guillermo Collazo @ [facility] in [place] and states they have orders and are contacting patient to set up therapy and home care services. Follow up appointments:    No future appointments.   Srinath SANDERSN  Care Transition Nurse for ortho bundle  [phone]

## 2022-07-20 ENCOUNTER — CARE COORDINATION (OUTPATIENT)
Dept: CASE MANAGEMENT | Age: 72
End: 2022-07-20

## 2022-07-20 NOTE — CARE COORDINATION
Spoke with patient. States no one has started care for patient. Incision status: States free from redness or drainage. Edema/Swelling: States having a lot of swelling. States applying heat to assist with. Discussed icing,but prefers heat. Pain level and status: States having pain when up on feet \"Stabbing\". Use of pain medications: States ran out of pain meds. Bowels: No complaints. Home Care Agency active: States home care agency/[facility] has not started. Discussed with Janeth Beauchamp @ Dr. Robe Parks that patient would like any agency to come see him, that it does not have to be the [place], and that they have not started with him. Do you have all of your medications: No, states out of pain meds. Changes in medications: No    Called [facility] @ Dr. Robe Parks office and [facility] patient needs a follow up visit for increase swelling. [facility] patient has a visit on 7/25/22 to see Dr. Andria Golden. States she will follow up with patient. Follow up appointments:    No future appointments.   Basia Young BSN  Care Transition Nurse for ortho bundle  [phone]

## 2022-07-20 NOTE — CARE COORDINATION
Called patient for updates and to see if home care therapy has started with patient. Left message for return call.   Hugo SANDERSN  Care Transition Nurse for ortho bundle  [phone]

## 2022-07-26 ENCOUNTER — CARE COORDINATION (OUTPATIENT)
Dept: CASE MANAGEMENT | Age: 72
End: 2022-07-26

## 2022-07-26 NOTE — CARE COORDINATION
Called patient to check in on updates. Patient states that pain management will get meds ordered to be picked up today. States therapy never started for patient and he does not want anything. Continued to express anger and hung up.   Hugo SANDERSN  Care Transition Nurse for ortho bundle  [phone]

## 2022-07-26 NOTE — CARE COORDINATION
Received call from Corky Morales @ [facility] in [facility]. Questioned my role and patient's needs. Discussed with Anuj Haywood that patient left the hospital and went to [facility] and once discharged from there, has been waiting on home physical therapy for a few weeks since he's been home. Explained orders were in place as verbalized from [place]. @ the [facility]. Anuj Ogdenmallory states that [facility] has been contacted through the [facility] to provide physical therapy for patient. States that she discussed this with patient.   [facility] Dr. Tre tsover and Roselia Cabral to speak with for any communication of procedure done at the hospital.  Magnus Canavan BSN  Care Transition Nurse for ortho bundle  [phone]

## 2023-07-14 NOTE — PROGRESS NOTES
[facility]   Phone: [phone]    Physical Therapy    [] Initial Evaluation            [x] Daily Treatment Note         [] Discharge Summary      Patient: Laith Meraz   : 1950   MRN: [id number]   Date of Service:  2022  Admitting Diagnosis: Primary osteoarthritis of right knee  Current Admission Summary: RIGHT ROBOTIC ASSISTED TOTAL KNEE ARTHROPLASTY   Past Medical History:  has a past medical history of Anxiety, CAD (coronary artery disease), Degenerative arthritis of knee, bilateral, Depression, GERD (gastroesophageal reflux disease), Opioid dependence with opioid-induced mood disorder ([facility] [address] and PTSD (post-traumatic stress disorder). Past Surgical History:  has a past surgical history that includes knee surgery (Bilateral); back surgery; Rotator cuff repair; other surgical history (Right, 2019); arthrodesis (Right, 3/14/2019); and Total knee arthroplasty (Right, 2022). Discharge Recommendations:Yonis Jenn Trevino scored a 22/24 on the AM-PAC short mobility form. Current research shows that an AM-PAC score of 18 or greater is typically associated with a discharge to the patient's home setting. Based on the patient's AM-PAC score and their current functional mobility deficits, it is recommended that the patient have 2-3 sessions per week of Physical Therapy at d/c to increase the patient's independence. At this time, this patient demonstrates the endurance and safety to discharge home with home health PT and a follow up treatment frequency of 2-3x/wk. Please see assessment section for further patient specific details. If patient discharges prior to next session this note will serve as a discharge summary. Please see below for the latest assessment towards goals.      HOME HEALTH CARE: LEVEL 1 STANDARD  - Initial home health evaluation to occur within 24-48 hours, in patient home   - Therapy to evaluate with goal of regaining prior level independent  Comments: Patient laying supine in bed upon entry with RLE elevated on pillows/blankets with knee flexed to approximately 30 degrees. Attempted to educate patient on keeping knee in extension during rest periods but patient unwilling to listen. Transfers  Sit to stand transfer: supervision  Stand to sit transfer: supervision  Bed to chair transfer: supervision  Car transfer: supervision   Comments: Patient declined gait belt, states, \"I won't fall. \"  OT offered to put on shoes or  socks as patient wearing regular cotton socks and patient stated, \"Here we go again. I've been walking with these socks just fine. \"  Patient reluctantly allowed OT to don  socks. Patient utilized RW to perform. Steady steps, no LOB. After performing car transfer, it was noticed patient's right sock was coming off. Patient instructed to remain sitting so sock could be adjusted for safety and patient stated, \"It's fine\" and stood up. Ambulation  Surface:level surface  Assistive Device: rolling walker  Assistance: stand by assistance  Distance: 198', 143'  Gait Mechanics: reciprocal with proper heel-to-toe gait pattern, no LOB, steady, somewhat fast ba but safe  Comments: Patient offered multiple rest breaks but declined. Stair Mobility  Number of Steps: 4  Step Height: 6 inch  Hand Rails: (B) handrail  Assistance: stand by assistance  Comments:  Patient ascended reciprocally despite education on leading with non-operative left leg. He descended reciprocally as well despite instruction to lead with operative RLE for safety. He stated, \"Well I'm going to have to do it this way eventually. \"    Wheelchair Mobility:  No w/c mobility completed on this date.   Comments:  Balance  Static Sitting Balance: good: independent with functional balance in unsupported position  Dynamic Sitting Balance: good: independent with functional balance in unsupported position  Static Standing Balance: fair (-): maintains balance at [facility] with use of UE support  Dynamic Standing Balance: fair (-): maintains balance at [facility] with use of UE support  Comments:    Other Therapeutic Interventions:  Patient reports he has been doing exercises on his own, estimates he has done 45 heel slides so far this morning. Patient will to perform ankle pumps 10 bilaterally, hip ABD/ADD supine w/ AAROM x10, quad sets x10 w/ VC to slow contraction to a 2-3 second hold, and heel slides w/ AAROM x8 to approximately 90 degrees. Patient declined to go to 10 as he has already done enough. Functional Outcomes  AM-PAC Inpatient Mobility Raw Score : 22                 Cognition  Overall Cognitive Status: WFL  Arousal/Alterness: appropriate responses to stimuli  Following Commands: follows one step commands consistently  Attention Span: appears intact  Safety Judgement: decreased awareness of need for safety  Problem Solving: able to problem solve independently  Insights: fully aware of deficits  Initiation: does not require cues  Sequencing: does not require cues  Orientation:    alert and oriented x 4  Command Following:   [facility]    Education  Barriers To Learning: cognition  Patient Education: patient educated on goals, PT role and benefits, plan of care, precautions, HEP, general safety, functional mobility training, transfer training  Learning Assessment:  patient verbalizes understanding, would benefit from continued reinforcement    Assessment  Activity Tolerance: Pt tolerated the PT treatment session well however reported increased pain with ambulation, pain medication in place prior to PT arrival  Impairments Requiring Therapeutic Intervention: decreased functional mobility, decreased ROM, decreased strength, decreased safety awareness, decreased cognition, decreased endurance, decreased balance, increased pain  Prognosis: fair  Clinical Assessment: Patient demonstrates good overall knee mobility and safe ambulation w/ RW two days post operatively.   He does demonstrate impaired safety awareness and is not receptive to therapist's suggestions regarding safety. He is easily agitated with therapist's recommendations and prefers to do things his way. Recommend 24 hour assist and continued therapy at d/c. Safety Interventions: patient left in bed, call light within reach, patient at risk for falls and nurse notified  Per MD note, patient is allowed to refuse chair and bed alarms. Plan  Frequency: 7 x/week  Current Treatment Recommendations: strengthening, balance training, functional mobility training, transfer training, gait training, stair training, endurance training and patient/caregiver education    Goals  Patient Goals: to return home   Short Term Goals:  Time Frame: by dc  Patient will complete bed mobility at modified independent (Goal met 7/1/2022)  Patient will complete transfers at [facility]. (Not met)  Patient will ambulate 100 ft with use of rolling walker at stand by assistance- met 06/30/22  Patient will ascend/descend 3 stairs with (B) handrail at contact guard assistance. (Goal met 7/1/2022)  Patient will ambulate 100 ft with use of rolling walker at [facility].   (Not met)    Therapy Session Time     First Session Therapy Time:   Individual Concurrent Group Co-treatment   Time In       0854   Time Out       0917   Minutes        23     Timed Code Treatment Minutes: 23 Minutes    Electronically Signed By: Gerard Kumar, PT, DPT, ATC no

## 2024-02-22 ENCOUNTER — HOSPITAL ENCOUNTER (EMERGENCY)
Age: 74
Discharge: HOME | End: 2024-02-22
Attending: EMERGENCY MEDICINE
Payer: MEDICARE

## 2024-02-22 ENCOUNTER — APPOINTMENT (OUTPATIENT)
Dept: GENERAL RADIOLOGY | Age: 74
End: 2024-02-22
Payer: MEDICARE

## 2024-02-22 VITALS
HEIGHT: 70 IN | TEMPERATURE: 97.5 F | RESPIRATION RATE: 11 BRPM | OXYGEN SATURATION: 97 % | HEART RATE: 50 BPM | SYSTOLIC BLOOD PRESSURE: 153 MMHG | BODY MASS INDEX: 30.06 KG/M2 | DIASTOLIC BLOOD PRESSURE: 88 MMHG | WEIGHT: 210 LBS

## 2024-02-22 DIAGNOSIS — I50.9 NEW ONSET OF CONGESTIVE HEART FAILURE (HCC): Primary | ICD-10-CM

## 2024-02-22 DIAGNOSIS — Z53.29 LEFT AGAINST MEDICAL ADVICE: ICD-10-CM

## 2024-02-22 DIAGNOSIS — Z98.890 POSTOPERATIVE STATE: ICD-10-CM

## 2024-02-22 DIAGNOSIS — Z79.01 CHRONIC ANTICOAGULATION: ICD-10-CM

## 2024-02-22 LAB
ALBUMIN SERPL-MCNC: 3.7 G/DL (ref 3.5–5.2)
ALP SERPL-CCNC: 145 U/L (ref 40–129)
ALT SERPL-CCNC: 40 U/L (ref 0–40)
ANION GAP SERPL CALCULATED.3IONS-SCNC: 11 MMOL/L (ref 7–16)
AST SERPL-CCNC: 23 U/L (ref 0–39)
B PARAP IS1001 DNA NPH QL NAA+NON-PROBE: NOT DETECTED
B PERT DNA SPEC QL NAA+PROBE: NOT DETECTED
BASOPHILS # BLD: 0.02 K/UL (ref 0–0.2)
BASOPHILS NFR BLD: 0 % (ref 0–2)
BILIRUB SERPL-MCNC: 0.4 MG/DL (ref 0–1.2)
BNP SERPL-MCNC: 2189 PG/ML (ref 0–125)
BUN SERPL-MCNC: 17 MG/DL (ref 6–23)
C PNEUM DNA NPH QL NAA+NON-PROBE: NOT DETECTED
CALCIUM SERPL-MCNC: 8.2 MG/DL (ref 8.6–10.2)
CHLORIDE SERPL-SCNC: 106 MMOL/L (ref 98–107)
CO2 SERPL-SCNC: 22 MMOL/L (ref 22–29)
CREAT SERPL-MCNC: 0.9 MG/DL (ref 0.7–1.2)
EOSINOPHIL # BLD: 0.11 K/UL (ref 0.05–0.5)
EOSINOPHILS RELATIVE PERCENT: 2 % (ref 0–6)
ERYTHROCYTE [DISTWIDTH] IN BLOOD BY AUTOMATED COUNT: 16.3 % (ref 11.5–15)
FLUAV RNA NPH QL NAA+NON-PROBE: NOT DETECTED
FLUBV RNA NPH QL NAA+NON-PROBE: NOT DETECTED
GFR SERPL CREATININE-BSD FRML MDRD: >60 ML/MIN/1.73M2
GLUCOSE SERPL-MCNC: 96 MG/DL (ref 74–99)
HADV DNA NPH QL NAA+NON-PROBE: NOT DETECTED
HCOV 229E RNA NPH QL NAA+NON-PROBE: NOT DETECTED
HCOV HKU1 RNA NPH QL NAA+NON-PROBE: NOT DETECTED
HCOV NL63 RNA NPH QL NAA+NON-PROBE: NOT DETECTED
HCOV OC43 RNA NPH QL NAA+NON-PROBE: NOT DETECTED
HCT VFR BLD AUTO: 29.6 % (ref 37–54)
HGB BLD-MCNC: 8.8 G/DL (ref 12.5–16.5)
HMPV RNA NPH QL NAA+NON-PROBE: NOT DETECTED
HPIV1 RNA NPH QL NAA+NON-PROBE: NOT DETECTED
HPIV2 RNA NPH QL NAA+NON-PROBE: NOT DETECTED
HPIV3 RNA NPH QL NAA+NON-PROBE: NOT DETECTED
HPIV4 RNA NPH QL NAA+NON-PROBE: NOT DETECTED
IMM GRANULOCYTES # BLD AUTO: <0.03 K/UL (ref 0–0.58)
IMM GRANULOCYTES NFR BLD: 0 % (ref 0–5)
LYMPHOCYTES NFR BLD: 1.13 K/UL (ref 1.5–4)
LYMPHOCYTES RELATIVE PERCENT: 22 % (ref 20–42)
M PNEUMO DNA NPH QL NAA+NON-PROBE: NOT DETECTED
MCH RBC QN AUTO: 25.4 PG (ref 26–35)
MCHC RBC AUTO-ENTMCNC: 29.7 G/DL (ref 32–34.5)
MCV RBC AUTO: 85.3 FL (ref 80–99.9)
MONOCYTES NFR BLD: 0.48 K/UL (ref 0.1–0.95)
MONOCYTES NFR BLD: 9 % (ref 2–12)
NEUTROPHILS NFR BLD: 66 % (ref 43–80)
NEUTS SEG NFR BLD: 3.42 K/UL (ref 1.8–7.3)
PLATELET # BLD AUTO: 191 K/UL (ref 130–450)
PMV BLD AUTO: 11.6 FL (ref 7–12)
POTASSIUM SERPL-SCNC: 4.2 MMOL/L (ref 3.5–5)
PROCALCITONIN SERPL-MCNC: 0.03 NG/ML (ref 0–0.08)
PROT SERPL-MCNC: 6.1 G/DL (ref 6.4–8.3)
RBC # BLD AUTO: 3.47 M/UL (ref 3.8–5.8)
RSV RNA NPH QL NAA+NON-PROBE: NOT DETECTED
RV+EV RNA NPH QL NAA+NON-PROBE: NOT DETECTED
SARS-COV-2 RNA NPH QL NAA+NON-PROBE: NOT DETECTED
SODIUM SERPL-SCNC: 139 MMOL/L (ref 132–146)
SPECIMEN DESCRIPTION: NORMAL
TROPONIN I SERPL HS-MCNC: 24 NG/L (ref 0–11)
TROPONIN I SERPL HS-MCNC: 24 NG/L (ref 0–11)
WBC OTHER # BLD: 5.2 K/UL (ref 4.5–11.5)

## 2024-02-22 PROCEDURE — 93005 ELECTROCARDIOGRAM TRACING: CPT | Performed by: STUDENT IN AN ORGANIZED HEALTH CARE EDUCATION/TRAINING PROGRAM

## 2024-02-22 PROCEDURE — 83880 ASSAY OF NATRIURETIC PEPTIDE: CPT

## 2024-02-22 PROCEDURE — 0202U NFCT DS 22 TRGT SARS-COV-2: CPT

## 2024-02-22 PROCEDURE — 84484 ASSAY OF TROPONIN QUANT: CPT

## 2024-02-22 PROCEDURE — 99285 EMERGENCY DEPT VISIT HI MDM: CPT

## 2024-02-22 PROCEDURE — 84145 PROCALCITONIN (PCT): CPT

## 2024-02-22 PROCEDURE — 85025 COMPLETE CBC W/AUTO DIFF WBC: CPT

## 2024-02-22 PROCEDURE — 71045 X-RAY EXAM CHEST 1 VIEW: CPT

## 2024-02-22 PROCEDURE — 80053 COMPREHEN METABOLIC PANEL: CPT

## 2024-02-22 RX ORDER — FUROSEMIDE 20 MG/1
20 TABLET ORAL DAILY
Qty: 7 TABLET | Refills: 0 | Status: SHIPPED | OUTPATIENT
Start: 2024-02-22 | End: 2024-02-29

## 2024-02-22 RX ORDER — FUROSEMIDE 10 MG/ML
40 INJECTION INTRAMUSCULAR; INTRAVENOUS ONCE
Status: DISCONTINUED | OUTPATIENT
Start: 2024-02-22 | End: 2024-02-22 | Stop reason: HOSPADM

## 2024-02-22 RX ORDER — METOPROLOL SUCCINATE 25 MG/1
25 TABLET, EXTENDED RELEASE ORAL DAILY
COMMUNITY

## 2024-02-22 RX ORDER — TORSEMIDE 20 MG/1
20 TABLET ORAL DAILY
COMMUNITY

## 2024-02-22 RX ORDER — ASPIRIN 81 MG/1
81 TABLET, CHEWABLE ORAL DAILY
COMMUNITY

## 2024-02-22 RX ORDER — ATORVASTATIN CALCIUM 20 MG/1
20 TABLET, FILM COATED ORAL NIGHTLY
COMMUNITY

## 2024-02-22 ASSESSMENT — LIFESTYLE VARIABLES: HOW OFTEN DO YOU HAVE A DRINK CONTAINING ALCOHOL: NEVER

## 2024-02-22 NOTE — ED PROVIDER NOTES
Entresto-increased SR  Will resume ACE-took Lisinopril prior to surgery    [ME]      ED Course User Index  [ME] Chad Jones, DO        Chronic Conditions:   Past Medical History:   Diagnosis Date    Anxiety     CAD (coronary artery disease)     Degenerative arthritis of knee, bilateral     Depression     GERD (gastroesophageal reflux disease)     Opioid dependence with opioid-induced mood disorder (HCC)     PTSD (post-traumatic stress disorder)        CONSULTS: (Who and What was discussed)  IP CONSULT TO INTERNAL MEDICINE    Discussion with Other Profesionals : Admitting Team Dr. Milanes    Social Determinants : None    Records Reviewed : None    CC/HPI Summary, DDx, ED Course, and Reassessment: Patient is placed on the cardiac monitor and continuous pulse ox to monitor rhythm and vitals. EKG ordered to evaluate patient's current cardiac rate, rhythm, and QT interval. CBC is ordered to evaluate for any signs of infection or inflammation by obtaining a WBC count, or any signs of acute anemia by interpreting hemoglobin. CMP for any electrolyte imbalances, kidney function, hepatic injury or any elevations in anion gap. Troponin as a marker for myocardial ischemia or heart strain. Viral swabs to evaluate for viral etiology of symptoms. BNP to evaluate for heart failure and/or as a marker for heart strain. Chest x-ray for any possible signs of, but without limitation to, pneumonia, pleural effusions, cardiomegaly, pneumothorax, atelectasis, rib or sternal abnormalities including fractures.  Patient's CBC revealed a hemoglobin of 8.8.  CMP was benign within normal limits.  Initial troponin was 24 with a repeat of 24 as well.  BNP was elevated at 2189.  Procalcitonin 0.03.  Respiratory panel was negative.  Chest x-ray revealed cardiomegaly with mild pulmonary vascular congestion.  Patient was set to be admitted to the hospital for further workup and treatment of his acute congestive heart failure.  Patient instead

## 2024-02-22 NOTE — ED NOTES
Patient refusing IV to be placed, only allowing staff to straight stick to get lab work. Physician notified

## 2024-02-22 NOTE — ED NOTES
Patient demanding to leave against medical advice. Discharge papers given. Patient now refusing to leave the room until he calls someone to pick him up.

## 2024-02-23 ENCOUNTER — HOSPITAL ENCOUNTER (INPATIENT)
Facility: HOSPITAL | Age: 74
LOS: 10 days | Discharge: HOME | DRG: 292 | End: 2024-03-04
Attending: EMERGENCY MEDICINE | Admitting: INTERNAL MEDICINE
Payer: MEDICARE

## 2024-02-23 ENCOUNTER — CLINICAL SUPPORT (OUTPATIENT)
Dept: EMERGENCY MEDICINE | Facility: HOSPITAL | Age: 74
DRG: 292 | End: 2024-02-23
Payer: MEDICARE

## 2024-02-23 ENCOUNTER — APPOINTMENT (OUTPATIENT)
Dept: RADIOLOGY | Facility: HOSPITAL | Age: 74
DRG: 292 | End: 2024-02-23
Payer: MEDICARE

## 2024-02-23 DIAGNOSIS — N20.0 NEPHROLITHIASIS: ICD-10-CM

## 2024-02-23 DIAGNOSIS — I50.23 ACUTE ON CHRONIC SYSTOLIC (CONGESTIVE) HEART FAILURE (MULTI): ICD-10-CM

## 2024-02-23 DIAGNOSIS — I50.23 ACUTE ON CHRONIC SYSTOLIC CONGESTIVE HEART FAILURE (MULTI): Primary | ICD-10-CM

## 2024-02-23 DIAGNOSIS — I50.42 CHRONIC COMBINED SYSTOLIC (CONGESTIVE) AND DIASTOLIC (CONGESTIVE) HEART FAILURE (MULTI): ICD-10-CM

## 2024-02-23 LAB
ALBUMIN SERPL BCP-MCNC: 3.7 G/DL (ref 3.4–5)
ALP SERPL-CCNC: 122 U/L (ref 33–136)
ALT SERPL W P-5'-P-CCNC: 38 U/L (ref 10–52)
ANION GAP BLDV CALCULATED.4IONS-SCNC: 10 MMOL/L (ref 10–25)
ANION GAP SERPL CALC-SCNC: 14 MMOL/L (ref 10–20)
APPEARANCE UR: CLEAR
APTT PPP: 32 SECONDS (ref 27–38)
AST SERPL W P-5'-P-CCNC: 27 U/L (ref 9–39)
BASE EXCESS BLDV CALC-SCNC: -0.4 MMOL/L (ref -2–3)
BASOPHILS # BLD AUTO: 0.03 X10*3/UL (ref 0–0.1)
BASOPHILS NFR BLD AUTO: 0.5 %
BILIRUB SERPL-MCNC: 0.6 MG/DL (ref 0–1.2)
BILIRUB UR STRIP.AUTO-MCNC: NEGATIVE MG/DL
BNP SERPL-MCNC: 219 PG/ML (ref 0–99)
BODY TEMPERATURE: 37 DEGREES CELSIUS
BUN SERPL-MCNC: 13 MG/DL (ref 6–23)
CA-I BLDV-SCNC: 1.18 MMOL/L (ref 1.1–1.33)
CALCIUM SERPL-MCNC: 8.4 MG/DL (ref 8.6–10.6)
CARDIAC TROPONIN I PNL SERPL HS: 22 NG/L (ref 0–53)
CHLORIDE BLDV-SCNC: 107 MMOL/L (ref 98–107)
CHLORIDE SERPL-SCNC: 107 MMOL/L (ref 98–107)
CO2 SERPL-SCNC: 23 MMOL/L (ref 21–32)
COLOR UR: ABNORMAL
CREAT SERPL-MCNC: 0.91 MG/DL (ref 0.5–1.3)
EGFRCR SERPLBLD CKD-EPI 2021: 89 ML/MIN/1.73M*2
EKG ATRIAL RATE: 58 BPM
EKG Q-T INTERVAL: 460 MS
EKG QRS DURATION: 126 MS
EKG QTC CALCULATION (BAZETT): 451 MS
EKG R AXIS: -54 DEGREES
EKG T AXIS: 96 DEGREES
EKG VENTRICULAR RATE: 58 BPM
EOSINOPHIL # BLD AUTO: 0.13 X10*3/UL (ref 0–0.4)
EOSINOPHIL NFR BLD AUTO: 2.1 %
ERYTHROCYTE [DISTWIDTH] IN BLOOD BY AUTOMATED COUNT: 16.2 % (ref 11.5–14.5)
GLUCOSE BLDV-MCNC: 104 MG/DL (ref 74–99)
GLUCOSE SERPL-MCNC: 95 MG/DL (ref 74–99)
GLUCOSE UR STRIP.AUTO-MCNC: NORMAL MG/DL
HCO3 BLDV-SCNC: 25.4 MMOL/L (ref 22–26)
HCT VFR BLD AUTO: 29 % (ref 41–52)
HCT VFR BLD EST: 29 % (ref 41–52)
HGB BLD-MCNC: 9.3 G/DL (ref 13.5–17.5)
HGB BLDV-MCNC: 9.7 G/DL (ref 13.5–17.5)
IMM GRANULOCYTES # BLD AUTO: 0.01 X10*3/UL (ref 0–0.5)
IMM GRANULOCYTES NFR BLD AUTO: 0.2 % (ref 0–0.9)
INHALED O2 CONCENTRATION: 0 %
INR PPP: 1.4 (ref 0.9–1.1)
KETONES UR STRIP.AUTO-MCNC: NEGATIVE MG/DL
LACTATE BLDV-SCNC: 1.4 MMOL/L (ref 0.4–2)
LEUKOCYTE ESTERASE UR QL STRIP.AUTO: NEGATIVE
LYMPHOCYTES # BLD AUTO: 1.17 X10*3/UL (ref 0.8–3)
LYMPHOCYTES NFR BLD AUTO: 19 %
MAGNESIUM SERPL-MCNC: 1.46 MG/DL (ref 1.6–2.4)
MCH RBC QN AUTO: 25.6 PG (ref 26–34)
MCHC RBC AUTO-ENTMCNC: 32.1 G/DL (ref 32–36)
MCV RBC AUTO: 80 FL (ref 80–100)
MONOCYTES # BLD AUTO: 0.65 X10*3/UL (ref 0.05–0.8)
MONOCYTES NFR BLD AUTO: 10.6 %
MUCOUS THREADS #/AREA URNS AUTO: NORMAL /LPF
NEUTROPHILS # BLD AUTO: 4.16 X10*3/UL (ref 1.6–5.5)
NEUTROPHILS NFR BLD AUTO: 67.6 %
NITRITE UR QL STRIP.AUTO: NEGATIVE
NRBC BLD-RTO: 0 /100 WBCS (ref 0–0)
OXYHGB MFR BLDV: 65.7 % (ref 45–75)
PCO2 BLDV: 46 MM HG (ref 41–51)
PH BLDV: 7.35 PH (ref 7.33–7.43)
PH UR STRIP.AUTO: 5.5 [PH]
PLATELET # BLD AUTO: 206 X10*3/UL (ref 150–450)
PO2 BLDV: 48 MM HG (ref 35–45)
POTASSIUM BLDV-SCNC: 4.3 MMOL/L (ref 3.5–5.3)
POTASSIUM SERPL-SCNC: 3.9 MMOL/L (ref 3.5–5.3)
PROT SERPL-MCNC: 6.4 G/DL (ref 6.4–8.2)
PROT UR STRIP.AUTO-MCNC: ABNORMAL MG/DL
PROTHROMBIN TIME: 15.8 SECONDS (ref 9.8–12.8)
RBC # BLD AUTO: 3.63 X10*6/UL (ref 4.5–5.9)
RBC # UR STRIP.AUTO: NEGATIVE /UL
RBC #/AREA URNS AUTO: NORMAL /HPF
SAO2 % BLDV: 68 % (ref 45–75)
SODIUM BLDV-SCNC: 138 MMOL/L (ref 136–145)
SODIUM SERPL-SCNC: 140 MMOL/L (ref 136–145)
SP GR UR STRIP.AUTO: 1.01
SQUAMOUS #/AREA URNS AUTO: NORMAL /HPF
UROBILINOGEN UR STRIP.AUTO-MCNC: NORMAL MG/DL
WBC # BLD AUTO: 6.2 X10*3/UL (ref 4.4–11.3)
WBC #/AREA URNS AUTO: NORMAL /HPF

## 2024-02-23 PROCEDURE — 99285 EMERGENCY DEPT VISIT HI MDM: CPT | Performed by: EMERGENCY MEDICINE

## 2024-02-23 PROCEDURE — 2500000002 HC RX 250 W HCPCS SELF ADMINISTERED DRUGS (ALT 637 FOR MEDICARE OP, ALT 636 FOR OP/ED)

## 2024-02-23 PROCEDURE — 74177 CT ABD & PELVIS W/CONTRAST: CPT | Performed by: RADIOLOGY

## 2024-02-23 PROCEDURE — 83735 ASSAY OF MAGNESIUM: CPT

## 2024-02-23 PROCEDURE — 2550000001 HC RX 255 CONTRASTS: Performed by: EMERGENCY MEDICINE

## 2024-02-23 PROCEDURE — 71275 CT ANGIOGRAPHY CHEST: CPT

## 2024-02-23 PROCEDURE — 1200000002 HC GENERAL ROOM WITH TELEMETRY DAILY

## 2024-02-23 PROCEDURE — 93010 ELECTROCARDIOGRAM REPORT: CPT | Performed by: INTERNAL MEDICINE

## 2024-02-23 PROCEDURE — 84132 ASSAY OF SERUM POTASSIUM: CPT | Performed by: EMERGENCY MEDICINE

## 2024-02-23 PROCEDURE — 93010 ELECTROCARDIOGRAM REPORT: CPT | Performed by: EMERGENCY MEDICINE

## 2024-02-23 PROCEDURE — 85025 COMPLETE CBC W/AUTO DIFF WBC: CPT | Performed by: EMERGENCY MEDICINE

## 2024-02-23 PROCEDURE — 96374 THER/PROPH/DIAG INJ IV PUSH: CPT

## 2024-02-23 PROCEDURE — 36415 COLL VENOUS BLD VENIPUNCTURE: CPT | Performed by: EMERGENCY MEDICINE

## 2024-02-23 PROCEDURE — 74177 CT ABD & PELVIS W/CONTRAST: CPT

## 2024-02-23 PROCEDURE — 2500000004 HC RX 250 GENERAL PHARMACY W/ HCPCS (ALT 636 FOR OP/ED)

## 2024-02-23 PROCEDURE — 80053 COMPREHEN METABOLIC PANEL: CPT | Performed by: EMERGENCY MEDICINE

## 2024-02-23 PROCEDURE — 85730 THROMBOPLASTIN TIME PARTIAL: CPT | Mod: 91 | Performed by: EMERGENCY MEDICINE

## 2024-02-23 PROCEDURE — 71275 CT ANGIOGRAPHY CHEST: CPT | Performed by: STUDENT IN AN ORGANIZED HEALTH CARE EDUCATION/TRAINING PROGRAM

## 2024-02-23 PROCEDURE — 81001 URINALYSIS AUTO W/SCOPE: CPT | Performed by: EMERGENCY MEDICINE

## 2024-02-23 PROCEDURE — S0109 METHADONE ORAL 5MG: HCPCS

## 2024-02-23 PROCEDURE — 84484 ASSAY OF TROPONIN QUANT: CPT | Performed by: EMERGENCY MEDICINE

## 2024-02-23 PROCEDURE — 83880 ASSAY OF NATRIURETIC PEPTIDE: CPT | Performed by: EMERGENCY MEDICINE

## 2024-02-23 PROCEDURE — 2500000001 HC RX 250 WO HCPCS SELF ADMINISTERED DRUGS (ALT 637 FOR MEDICARE OP)

## 2024-02-23 PROCEDURE — 2500000004 HC RX 250 GENERAL PHARMACY W/ HCPCS (ALT 636 FOR OP/ED): Performed by: STUDENT IN AN ORGANIZED HEALTH CARE EDUCATION/TRAINING PROGRAM

## 2024-02-23 PROCEDURE — 99285 EMERGENCY DEPT VISIT HI MDM: CPT | Mod: 25

## 2024-02-23 PROCEDURE — 85730 THROMBOPLASTIN TIME PARTIAL: CPT | Performed by: EMERGENCY MEDICINE

## 2024-02-23 PROCEDURE — 93005 ELECTROCARDIOGRAM TRACING: CPT

## 2024-02-23 RX ORDER — LANOLIN ALCOHOL/MO/W.PET/CERES
400 CREAM (GRAM) TOPICAL ONCE
Status: COMPLETED | OUTPATIENT
Start: 2024-02-23 | End: 2024-02-23

## 2024-02-23 RX ORDER — COLCHICINE 0.6 MG/1
0.6 TABLET ORAL DAILY
Status: DISCONTINUED | OUTPATIENT
Start: 2024-02-23 | End: 2024-02-23

## 2024-02-23 RX ORDER — ATORVASTATIN CALCIUM 40 MG/1
40 TABLET, FILM COATED ORAL DAILY
Status: DISCONTINUED | OUTPATIENT
Start: 2024-02-23 | End: 2024-03-04 | Stop reason: HOSPADM

## 2024-02-23 RX ORDER — LISINOPRIL 5 MG/1
5 TABLET ORAL DAILY
Status: DISCONTINUED | OUTPATIENT
Start: 2024-02-23 | End: 2024-02-24

## 2024-02-23 RX ORDER — OMEPRAZOLE 20 MG/1
20 CAPSULE, DELAYED RELEASE ORAL
Status: ON HOLD | COMMUNITY
End: 2024-02-23 | Stop reason: ALTCHOICE

## 2024-02-23 RX ORDER — PANTOPRAZOLE SODIUM 20 MG/1
20 TABLET, DELAYED RELEASE ORAL
Status: DISCONTINUED | OUTPATIENT
Start: 2024-02-24 | End: 2024-03-04 | Stop reason: HOSPADM

## 2024-02-23 RX ORDER — TORSEMIDE 20 MG/1
20 TABLET ORAL DAILY
COMMUNITY
End: 2024-03-04 | Stop reason: HOSPADM

## 2024-02-23 RX ORDER — ALBUTEROL SULFATE 90 UG/1
2 AEROSOL, METERED RESPIRATORY (INHALATION) EVERY 6 HOURS PRN
Status: ON HOLD | COMMUNITY
Start: 2023-11-25 | End: 2024-03-04 | Stop reason: SDUPTHER

## 2024-02-23 RX ORDER — POLYETHYLENE GLYCOL 3350 17 G/17G
17 POWDER, FOR SOLUTION ORAL DAILY
Status: DISCONTINUED | OUTPATIENT
Start: 2024-02-23 | End: 2024-03-02

## 2024-02-23 RX ORDER — ASCORBIC ACID 500 MG
1000 TABLET ORAL DAILY
Status: DISCONTINUED | OUTPATIENT
Start: 2024-02-23 | End: 2024-03-04 | Stop reason: HOSPADM

## 2024-02-23 RX ORDER — ASCORBIC ACID 500 MG
500 TABLET ORAL DAILY
Status: ON HOLD | COMMUNITY
End: 2024-03-04 | Stop reason: SDUPTHER

## 2024-02-23 RX ORDER — TIOTROPIUM BROMIDE INHALATION SPRAY 3.12 UG/1
2 SPRAY, METERED RESPIRATORY (INHALATION) DAILY
Status: ON HOLD | COMMUNITY
End: 2024-03-04 | Stop reason: SDUPTHER

## 2024-02-23 RX ORDER — VIT C/E/ZN/COPPR/LUTEIN/ZEAXAN 250MG-90MG
25 CAPSULE ORAL DAILY
Status: ON HOLD | COMMUNITY
End: 2024-03-04 | Stop reason: SDUPTHER

## 2024-02-23 RX ORDER — ALBUTEROL SULFATE 90 UG/1
2 AEROSOL, METERED RESPIRATORY (INHALATION) EVERY 6 HOURS PRN
Status: DISCONTINUED | OUTPATIENT
Start: 2024-02-23 | End: 2024-03-04 | Stop reason: HOSPADM

## 2024-02-23 RX ORDER — FUROSEMIDE 10 MG/ML
40 INJECTION INTRAMUSCULAR; INTRAVENOUS ONCE
Status: COMPLETED | OUTPATIENT
Start: 2024-02-23 | End: 2024-02-23

## 2024-02-23 RX ORDER — MAGNESIUM SULFATE HEPTAHYDRATE 40 MG/ML
4 INJECTION, SOLUTION INTRAVENOUS ONCE
Status: DISCONTINUED | OUTPATIENT
Start: 2024-02-23 | End: 2024-02-23

## 2024-02-23 RX ORDER — PANTOPRAZOLE SODIUM 20 MG/1
20 TABLET, DELAYED RELEASE ORAL
COMMUNITY
End: 2024-03-04 | Stop reason: HOSPADM

## 2024-02-23 RX ORDER — METHADONE HYDROCHLORIDE 10 MG/1
20 TABLET ORAL EVERY 12 HOURS
COMMUNITY

## 2024-02-23 RX ORDER — SERTRALINE HYDROCHLORIDE 100 MG/1
100 TABLET, FILM COATED ORAL NIGHTLY
Status: ON HOLD | COMMUNITY
Start: 2023-11-25 | End: 2024-02-23 | Stop reason: ALTCHOICE

## 2024-02-23 RX ORDER — ENOXAPARIN SODIUM 100 MG/ML
40 INJECTION SUBCUTANEOUS EVERY 24 HOURS
Status: DISCONTINUED | OUTPATIENT
Start: 2024-02-23 | End: 2024-02-23

## 2024-02-23 RX ORDER — IBUPROFEN 200 MG
1 TABLET ORAL DAILY PRN
Status: DISCONTINUED | OUTPATIENT
Start: 2024-02-23 | End: 2024-03-04 | Stop reason: HOSPADM

## 2024-02-23 RX ORDER — COLCHICINE 0.6 MG/1
0.6 TABLET ORAL DAILY
Status: ON HOLD | COMMUNITY
Start: 2023-11-26 | End: 2024-02-23 | Stop reason: ALTCHOICE

## 2024-02-23 RX ORDER — METOPROLOL SUCCINATE 25 MG/1
25 TABLET, EXTENDED RELEASE ORAL DAILY
Status: DISCONTINUED | OUTPATIENT
Start: 2024-02-23 | End: 2024-03-04 | Stop reason: HOSPADM

## 2024-02-23 RX ORDER — ATORVASTATIN CALCIUM 40 MG/1
40 TABLET, FILM COATED ORAL DAILY
Status: ON HOLD | COMMUNITY
Start: 2023-11-25 | End: 2024-03-04 | Stop reason: SDUPTHER

## 2024-02-23 RX ORDER — METHADONE HYDROCHLORIDE 10 MG/1
20 TABLET ORAL EVERY 12 HOURS
Status: DISCONTINUED | OUTPATIENT
Start: 2024-02-23 | End: 2024-03-04 | Stop reason: HOSPADM

## 2024-02-23 RX ORDER — METOPROLOL SUCCINATE 25 MG/1
25 TABLET, EXTENDED RELEASE ORAL DAILY
Status: ON HOLD | COMMUNITY
End: 2024-03-04 | Stop reason: SDUPTHER

## 2024-02-23 RX ORDER — FERROUS SULFATE, DRIED 160(50) MG
1 TABLET, EXTENDED RELEASE ORAL DAILY
Status: ON HOLD | COMMUNITY
End: 2024-03-04 | Stop reason: SDUPTHER

## 2024-02-23 RX ORDER — LISINOPRIL 5 MG/1
5 TABLET ORAL DAILY
Status: ON HOLD | COMMUNITY
Start: 2023-08-16 | End: 2024-03-04 | Stop reason: SDUPTHER

## 2024-02-23 RX ORDER — ASPIRIN 81 MG/1
81 TABLET ORAL DAILY
Status: DISCONTINUED | OUTPATIENT
Start: 2024-02-23 | End: 2024-03-04 | Stop reason: HOSPADM

## 2024-02-23 RX ORDER — ASPIRIN 81 MG/1
81 TABLET ORAL DAILY
Status: ON HOLD | COMMUNITY
Start: 2023-11-25 | End: 2024-02-23 | Stop reason: ALTCHOICE

## 2024-02-23 RX ORDER — FUROSEMIDE 20 MG/1
20 TABLET ORAL DAILY
COMMUNITY
End: 2024-03-04 | Stop reason: HOSPADM

## 2024-02-23 RX ORDER — MAGNESIUM SULFATE HEPTAHYDRATE 40 MG/ML
2 INJECTION, SOLUTION INTRAVENOUS ONCE
Status: COMPLETED | OUTPATIENT
Start: 2024-02-23 | End: 2024-02-24

## 2024-02-23 RX ORDER — SERTRALINE HYDROCHLORIDE 100 MG/1
100 TABLET, FILM COATED ORAL NIGHTLY
Status: DISCONTINUED | OUTPATIENT
Start: 2024-02-23 | End: 2024-02-23

## 2024-02-23 RX ADMIN — IOHEXOL 90 ML: 350 INJECTION, SOLUTION INTRAVENOUS at 15:52

## 2024-02-23 RX ADMIN — METHADONE HYDROCHLORIDE 20 MG: 10 TABLET ORAL at 22:53

## 2024-02-23 RX ADMIN — FUROSEMIDE 40 MG: 10 INJECTION, SOLUTION INTRAMUSCULAR; INTRAVENOUS at 15:01

## 2024-02-23 RX ADMIN — FUROSEMIDE 40 MG: 10 INJECTION, SOLUTION INTRAMUSCULAR; INTRAVENOUS at 22:54

## 2024-02-23 RX ADMIN — MAGNESIUM SULFATE HEPTAHYDRATE 2 G: 40 INJECTION, SOLUTION INTRAVENOUS at 22:54

## 2024-02-23 RX ADMIN — ASPIRIN 81 MG: 81 TABLET, COATED ORAL at 22:53

## 2024-02-23 RX ADMIN — ATORVASTATIN CALCIUM 40 MG: 40 TABLET, FILM COATED ORAL at 22:53

## 2024-02-23 RX ADMIN — OXYCODONE HYDROCHLORIDE AND ACETAMINOPHEN 1000 MG: 500 TABLET ORAL at 22:53

## 2024-02-23 RX ADMIN — Medication 400 MG: at 17:25

## 2024-02-23 SDOH — SOCIAL STABILITY: SOCIAL INSECURITY: HAVE YOU HAD THOUGHTS OF HARMING ANYONE ELSE?: NO

## 2024-02-23 SDOH — SOCIAL STABILITY: SOCIAL INSECURITY: DO YOU FEEL UNSAFE GOING BACK TO THE PLACE WHERE YOU ARE LIVING?: NO

## 2024-02-23 SDOH — SOCIAL STABILITY: SOCIAL INSECURITY: ABUSE: ADULT

## 2024-02-23 SDOH — SOCIAL STABILITY: SOCIAL INSECURITY: HAS ANYONE EVER THREATENED TO HURT YOUR FAMILY OR YOUR PETS?: YES

## 2024-02-23 SDOH — SOCIAL STABILITY: SOCIAL INSECURITY: ARE YOU OR HAVE YOU BEEN THREATENED OR ABUSED PHYSICALLY, EMOTIONALLY, OR SEXUALLY BY ANYONE?: YES

## 2024-02-23 SDOH — SOCIAL STABILITY: SOCIAL INSECURITY: DO YOU FEEL ANYONE HAS EXPLOITED OR TAKEN ADVANTAGE OF YOU FINANCIALLY OR OF YOUR PERSONAL PROPERTY?: NO

## 2024-02-23 SDOH — SOCIAL STABILITY: SOCIAL INSECURITY: WERE YOU ABLE TO COMPLETE ALL THE BEHAVIORAL HEALTH SCREENINGS?: YES

## 2024-02-23 SDOH — SOCIAL STABILITY: SOCIAL INSECURITY: ARE THERE ANY APPARENT SIGNS OF INJURIES/BEHAVIORS THAT COULD BE RELATED TO ABUSE/NEGLECT?: NO

## 2024-02-23 SDOH — SOCIAL STABILITY: SOCIAL INSECURITY: DOES ANYONE TRY TO KEEP YOU FROM HAVING/CONTACTING OTHER FRIENDS OR DOING THINGS OUTSIDE YOUR HOME?: YES

## 2024-02-23 ASSESSMENT — COGNITIVE AND FUNCTIONAL STATUS - GENERAL
MOBILITY SCORE: 24
PATIENT BASELINE BEDBOUND: NO
DAILY ACTIVITIY SCORE: 24

## 2024-02-23 ASSESSMENT — ACTIVITIES OF DAILY LIVING (ADL)
WALKS IN HOME: INDEPENDENT
DRESSING YOURSELF: INDEPENDENT
GROOMING: INDEPENDENT
HEARING - RIGHT EAR: DIFFICULTY WITH NOISE
BATHING: INDEPENDENT
ADEQUATE_TO_COMPLETE_ADL: YES
PATIENT'S MEMORY ADEQUATE TO SAFELY COMPLETE DAILY ACTIVITIES?: YES
HEARING - LEFT EAR: DIFFICULTY WITH NOISE
FEEDING YOURSELF: INDEPENDENT
TOILETING: INDEPENDENT
ASSISTIVE_DEVICE: EYEGLASSES
JUDGMENT_ADEQUATE_SAFELY_COMPLETE_DAILY_ACTIVITIES: YES

## 2024-02-23 ASSESSMENT — LIFESTYLE VARIABLES
HOW OFTEN DO YOU HAVE 6 OR MORE DRINKS ON ONE OCCASION: NEVER
AUDIT-C TOTAL SCORE: 1
SKIP TO QUESTIONS 9-10: 1
HOW MANY STANDARD DRINKS CONTAINING ALCOHOL DO YOU HAVE ON A TYPICAL DAY: 1 OR 2
HOW OFTEN DO YOU HAVE A DRINK CONTAINING ALCOHOL: MONTHLY OR LESS
AUDIT-C TOTAL SCORE: 1

## 2024-02-23 ASSESSMENT — COLUMBIA-SUICIDE SEVERITY RATING SCALE - C-SSRS
6. HAVE YOU EVER DONE ANYTHING, STARTED TO DO ANYTHING, OR PREPARED TO DO ANYTHING TO END YOUR LIFE?: NO
2. HAVE YOU ACTUALLY HAD ANY THOUGHTS OF KILLING YOURSELF?: NO
1. IN THE PAST MONTH, HAVE YOU WISHED YOU WERE DEAD OR WISHED YOU COULD GO TO SLEEP AND NOT WAKE UP?: NO

## 2024-02-23 ASSESSMENT — PATIENT HEALTH QUESTIONNAIRE - PHQ9
1. LITTLE INTEREST OR PLEASURE IN DOING THINGS: NEARLY EVERY DAY
2. FEELING DOWN, DEPRESSED OR HOPELESS: MORE THAN HALF THE DAYS
SUM OF ALL RESPONSES TO PHQ9 QUESTIONS 1 & 2: 5

## 2024-02-23 ASSESSMENT — PAIN - FUNCTIONAL ASSESSMENT: PAIN_FUNCTIONAL_ASSESSMENT: 0-10

## 2024-02-23 ASSESSMENT — PAIN DESCRIPTION - DESCRIPTORS: DESCRIPTORS: CRAMPING

## 2024-02-23 ASSESSMENT — PAIN SCALES - GENERAL: PAINLEVEL_OUTOF10: 7

## 2024-02-23 NOTE — ED PROVIDER NOTES
CC: Shortness of Breath     HPI:  Patient is a 73-year-old male with a past medical history of CHF (EF 30%), CABG November 2023, A-fib, PTSD, depression, chronic pain and GERD who presents to the ED today with worsening shortness of breath and abdominal pain.  Patient states he has been unable to take his medications for the last 3 to 4 days as his daughter who manages his medications is not available.  He notices symptoms getting worse over the last 4 days as well.  States he had a surgery done at [facility] however [facility]  Was seen at [facility] in [place] yesterday left AMA.  No other associate signs or symptoms reported at this time.    Limitations to History: none  Additional History provided by: N/A    External Records Reviewed:  Recent available ED and inpatient notes reviewed in EMR.  I reviewed the patient's records from yesterday done at [facility], reviewed the chest x-ray findings in addition to the lab work. Discharge summary from [facility] 11/20/2023    PMHx/PSHx:  Per HPI.   - has no past medical history on file.  - has no past surgical history on file.    Medications:  Reviewed in EMR. See EMR for complete list of medications and doses.    Allergies:  Morphine    Social History:  - Tobacco:  has no history on file for tobacco use.   - Alcohol:  has no history on file for alcohol use.   - Illicit Drugs:  has no history on file for drug use.     ROS:  Per HPI.     ???????????????????????????????????????????????????????????????  Triage Vitals:  T 36.6 °C (97.9 °F)  HR 61  /67  RR 20  O2 96 %      Physical Exam  Constitutional:       General: He is not in acute distress.     Appearance: Normal appearance. He is not toxic-appearing.   HENT:      Head: Normocephalic and atraumatic.      Mouth/Throat:      Mouth: Mucous membranes are moist.   Eyes:      General: No scleral icterus.     Conjunctiva/sclera: Conjunctivae normal.   Cardiovascular:      Rate and Rhythm: Normal  rate and regular rhythm.      Pulses: Normal pulses.   Pulmonary:      Effort: Pulmonary effort is normal.      Breath sounds: Normal breath sounds.   Abdominal:      General: Bowel sounds are normal. There is no distension.      Palpations: Abdomen is soft.      Tenderness: There is abdominal tenderness (Diffusely, not peritonitic). There is no guarding or rebound.   Musculoskeletal:         General: Normal range of motion.      Cervical back: Normal range of motion and neck supple.      Right lower leg: Edema (3+ up to the calves) present.      Left lower leg: Edema (3+ into the calves) present.   Skin:     General: Skin is warm and dry.   Neurological:      General: No focal deficit present.      Mental Status: He is alert.   Psychiatric:         Mood and Affect: Mood normal.         Behavior: Behavior normal.         Thought Content: Thought content normal.         Judgment: Judgment normal.       ???????????????????????????????????????????????????????????????  ED Course:  Diagnoses as of 02/23/24 1730   Acute on chronic systolic congestive heart failure (CMS/HCC)       EKG & Images:  EKG interpreted by ED attending: Ventricular rate of 52 beats per minute. Evidences of atrial fib vs atrial flutter. Patient has wide QRS. There are no ischemic changes.       MDM:  -Patient is a 73-year-old male with a significant past medical history of CHF, CABG November 2023, CAD status post PCI in 2001, nephrolithiasis and PTSD who came into the emergency department today with symptomatic congestive heart failure.  Patient has significant shortness of breath in addition to orthopnea and bilateral pitting edema up into his calves.  Patient has been without medications for several days and was given a dose of 40 mg of IV Lasix in addition to magnesium.  CT PE and CT abdomen pelvis with IV contrast was done to evaluate the patient's shortness of breath and abdominal pain.  Patient supposed to be on Xarelto and has not been taking  "his medications for several days and has noticed worsening shortness of breath, some pleuritic pain, not hypoxic not tachycardic however still concern for PE which is why the CT PE was done.  Patient's exam showed diffuse abdominal tenderness without rebound or guarding.      CT PE did not show any evidence of pulmonary embolism however did find some chronic findings.  Additionally CT abdomen showed nephrolithiasis without obstruction and chronic pancreatitis.      Patient does not believe his symptoms are related to his heart, feels like his shortness of breath and inflammation and congestion is related to either his kidney stones or his gallbladder being enlarged, so to the patient how this is likely related to his heart however he does not think this is the case.  Patient will be admitted to cardiology for further diuresis and management of his symptoms.  Patient informed of his admission status and agreeable with this plan.    Final diagnoses:   None         Social Determinants Limiting Care:      Disposition:  Admit to floor    [facility] \"[facility] [place]  Emergency Medicine Resident, PGY1  [facility]   This patient was seen and staffed with Dr. Thanh Kaba    Disclaimer: This note was dictated by speech recognition. Minor errors in transcription may be present    Procedures ? [employer] last updated 2/23/2024 3:36 PM        Nora Hidalgo DO  Resident  02/23/24 [id number]       Nora Hidalgo DO  Resident  02/23/24 [id number]    -------------------------------------------  This patient was seen by the resident physician.  I have seen and examined the patient, agree with the workup, evaluation, management and diagnosis. I reviewed and edited the above documentation where necessary.     I have looked at the EKG and agree with the interpretation.    Thanh Kaba MD   Attending Physician      Thanh Kaba MD MPH  02/24/24 [id number]    "

## 2024-02-23 NOTE — ED TRIAGE NOTES
11/2023 had open heart, reports SOB x3 days. WOB mildly increased, spo2 WNL. Denies pulmonary hx. Appears pale and dry. Reports increased SOB on exertion. Denies cough, fever, chillls, N/V, HA, CP, dizziness, BV, abd pain.

## 2024-02-23 NOTE — H&P
History Of Present Illness  Marco Antonio Montalvo is a 73 y.o. male PMH HFmrEF (EF 48% in 11/23), CAD s/p PCI in 2001 and CABG x3 (LIMA-LAD, SVG-PDA, SVG-D2) 11/2023, A-fib RVR on xaretlo, cholelithiasis, nephrolithiasis c/b pyelonephritis, PTSD, depression, chronic pain and GERD who presents today with several days of worsening shortness of breath and abdominal pain.     Patient states that for the last 4-5 days he has been having progressively worsening shortness of breath and swelling in his legs. He denies associated chest pain, cough, fevers, chills. He states shortness of breath is worse with exertion and he can no longer lay flat to sleep. Weight has also gone up. Reports dry weight ~190s. Patient initially presented to [facility] in [place] yesterday for these concerns, was being diuresed, but left AMA. BNP 2, 189, trop 24, procal 0.03. CXR cardiomegaly w/ mild pulmonary vascular congestion. He was discharge with script for lasix (7 days of 20 mg), 5 of which he took yesterday. He states his daughter usually manages his medications, but she has been unavailable to help him, so he has not taken any medications aside from the lasix yesterday for several days. He also endorses diffuse abdominal pain worse with eating and bloating worse in the last couple days. He denies N/V, diarrhea, constipation. Improves when he takes his PPI.    [facility]     HDS and afebrile in ED, O2 sat 96% on RA. He later requested to be put on oxygen, came up to 100% on 2L NC. Labs w/ wbc 6.2, cmp unremarkable with normal Cr and LFTs, Mag 1.46, vbg wnl, hs trop 22, . UA bland. ECG w/o ischemic changes. CTPE w/o acute PE. Did note dilated main pulmonary artery, mild left ventricular and left atrial enlargement, aneurysmal dilatation ascending thoracic aorta measuring up to 4.5 cm, multiple prominent to mildly enlarged mediastinal lymph nodes, and patchy mosaic attenuation within bilateral lungs,  "which may represent a component of small vessel/small airway disease. CT A/P w/o acute abdominal findings, incidentally noted chronic pancreatitis and non-obstructing bilateral nephrolithiasis. He got 40 mg IV lasix and 400 mg PO mag in ED and was admitted to floor.     Past Medical History  As above    Surgical History  No past surgical history on file.     Social History  Tobacco use   No other substance use    Family History  No family history on file.     Allergies  Morphine    Review of Systems  As per HPI     Physical Exam  Constitutional: NAD  HEENT: EOMI, no scleral icterus, MMM  CV: irregular, no m/r/g  Pulm: short of breath while speaking, diminished in bilateral bases, mild crackles in bases  GI: Soft, mildly tender diffusely, non distended  Extremities: 3+ pitting edema bilaterally  Skin: warm  Neuro: grossly alert and oriented     Last Recorded Vitals  Blood pressure 129/67, pulse 61, temperature 36.6 °C (97.9 °F), resp. rate 20, height 1.753 m (5' 9\"), weight 95.3 kg (210 lb), SpO2 100 %.    Relevant Results  Results for orders placed or performed during the hospital encounter of 02/23/24 (from the past 24 hour(s))   CBC and Auto Differential   Result Value Ref Range    WBC 6.2 4.4 - 11.3 x10*3/uL    nRBC 0.0 0.0 - 0.0 /100 WBCs    RBC 3.63 (L) 4.50 - 5.90 x10*6/uL    Hemoglobin 9.3 (L) 13.5 - 17.5 g/dL    Hematocrit 29.0 (L) 41.0 - 52.0 %    MCV 80 80 - 100 fL    MCH 25.6 (L) 26.0 - 34.0 pg    MCHC 32.1 32.0 - 36.0 g/dL    RDW 16.2 (H) 11.5 - 14.5 %    Platelets 206 150 - 450 x10*3/uL    Neutrophils % 67.6 40.0 - 80.0 %    Immature Granulocytes %, Automated 0.2 0.0 - 0.9 %    Lymphocytes % 19.0 13.0 - 44.0 %    Monocytes % 10.6 2.0 - 10.0 %    Eosinophils % 2.1 0.0 - 6.0 %    Basophils % 0.5 0.0 - 2.0 %    Neutrophils Absolute 4.16 1.60 - 5.50 x10*3/uL    Immature Granulocytes Absolute, Automated 0.01 0.00 - 0.50 x10*3/uL    Lymphocytes Absolute 1.17 0.80 - 3.00 x10*3/uL    Monocytes Absolute 0.65 " 0.05 - 0.80 x10*3/uL    Eosinophils Absolute 0.13 0.00 - 0.40 x10*3/uL    Basophils Absolute 0.03 0.00 - 0.10 x10*3/uL   Comprehensive Metabolic Panel   Result Value Ref Range    Glucose 95 74 - 99 mg/dL    Sodium 140 136 - 145 mmol/L    Potassium 3.9 3.5 - 5.3 mmol/L    Chloride 107 98 - 107 mmol/L    Bicarbonate 23 21 - 32 mmol/L    Anion Gap 14 10 - 20 mmol/L    Urea Nitrogen 13 6 - 23 mg/dL    Creatinine 0.91 0.50 - 1.30 mg/dL    eGFR 89 >60 mL/min/1.73m*2    Calcium 8.4 (L) 8.6 - 10.6 mg/dL    Albumin 3.7 3.4 - 5.0 g/dL    Alkaline Phosphatase 122 33 - 136 U/L    Total Protein 6.4 6.4 - 8.2 g/dL    AST 27 9 - 39 U/L    Bilirubin, Total 0.6 0.0 - 1.2 mg/dL    ALT 38 10 - 52 U/L   Troponin I, High Sensitivity   Result Value Ref Range    Troponin I, High Sensitivity 22 0 - 53 ng/L   B-Type Natriuretic Peptide   Result Value Ref Range     (H) 0 - 99 pg/mL   Blood Gas Venous Full Panel   Result Value Ref Range    POCT pH, Venous 7.35 7.33 - 7.43 pH    POCT pCO2, Venous 46 41 - 51 mm Hg    POCT pO2, Venous 48 (H) 35 - 45 mm Hg    POCT SO2, Venous 68 45 - 75 %    POCT Oxy Hemoglobin, Venous 65.7 45.0 - 75.0 %    POCT Hematocrit Calculated, Venous 29.0 (L) 41.0 - 52.0 %    POCT Sodium, Venous 138 136 - 145 mmol/L    POCT Potassium, Venous 4.3 3.5 - 5.3 mmol/L    POCT Chloride, Venous 107 98 - 107 mmol/L    POCT Ionized Calicum, Venous 1.18 1.10 - 1.33 mmol/L    POCT Glucose, Venous 104 (H) 74 - 99 mg/dL    POCT Lactate, Venous 1.4 0.4 - 2.0 mmol/L    POCT Base Excess, Venous -0.4 -2.0 - 3.0 mmol/L    POCT HCO3 Calculated, Venous 25.4 22.0 - 26.0 mmol/L    POCT Hemoglobin, Venous 9.7 (L) 13.5 - 17.5 g/dL    POCT Anion Gap, Venous 10.0 10.0 - 25.0 mmol/L    Patient Temperature 37.0 degrees Celsius    FiO2 0 %   Coagulation Screen   Result Value Ref Range    Protime 15.8 (H) 9.8 - 12.8 seconds    INR 1.4 (H) 0.9 - 1.1    aPTT 32 27 - 38 seconds   Magnesium   Result Value Ref Range    Magnesium 1.46 (L) 1.60 - 2.40  mg/dL   Urinalysis with Reflex Culture and Microscopic   Result Value Ref Range    Color, Urine Light-Yellow Light-Yellow, Yellow, Dark-Yellow    Appearance, Urine Clear Clear    Specific Gravity, Urine 1.008 1.005 - 1.035    pH, Urine 5.5 5.0, 5.5, 6.0, 6.5, 7.0, 7.5, 8.0    Protein, Urine 50 (1+) (A) NEGATIVE, 10 (TRACE), 20 (TRACE) mg/dL    Glucose, Urine Normal Normal mg/dL    Blood, Urine NEGATIVE NEGATIVE    Ketones, Urine NEGATIVE NEGATIVE mg/dL    Bilirubin, Urine NEGATIVE NEGATIVE    Urobilinogen, Urine Normal Normal mg/dL    Nitrite, Urine NEGATIVE NEGATIVE    Leukocyte Esterase, Urine NEGATIVE NEGATIVE   Urinalysis Microscopic   Result Value Ref Range    WBC, Urine 1-5 1-5, NONE /HPF    RBC, Urine NONE NONE, 1-2, 3-5 /HPF    Squamous Epithelial Cells, Urine 1-9 (SPARSE) Reference range not established. /HPF    Mucus, Urine FEW Reference range not established. /LPF         Assessment/Plan   Principal Problem:    Acute on chronic systolic (congestive) heart failure (CMS/[facility])    Marco Antonio Montalvo is a 73 y.o. male PMH HFmrEF (EF 48% in 11/23), CAD s/p PCI in 2001 and CABG x3 (LIMA-LAD, SVG-PDA, SVG-D2) 11/2023, A-fib RVR on xaretlo, cholelithiasis, nephrolithiasis c/b pyelonephritis, PTSD, depression, chronic pain and GERD who presents today with several days of worsening shortness of breath and abdominal pain. Worsening HUNTER, LE edema and orthopnea in setting of elevated BNP and vascular congestion on imaging. Will continue to diurese for suspected acute decompensation heart failure iso medication noncompliance. Low suspicion for underlying infection as afebrile, no leukocytosis and normal procal.     #Acute on Chronic Systolic HF (EF 48%) s/t medication noncompliance  #CAD s/p CABG (11/23)  :: TTTE 11/23 w/ EF 48% post-CABG  :: dry weight ~190lbs, admission weight 210 lbs.  :: trop negative, BNP elevated at 219 (2000s at OSH)  :: supposed to be on torsemide 20 mg daily at home, metoprolol succinate ER 25 mg  daily, lisinopril 5 daily  :: CXR w/ cardiomegaly and vascular congestion  :: CTPE w/ did note dilated main pulmonary artery, mild left ventricular and left atrial enlargement, a multiple prominent to mildly enlarged mediastinal lymph nodes, and patchy mosaic attenuation within bilateral lungs, which may represent a component of small vessel/small airway disease.  -continue home atorvastatin 40 mg  -hold home metoprolol succinate and lisinopril  -s/p 40 mg IV lasix in ED, give additional 40 IV tonight  -f/up repeat rfp, Mg  -strict I&O's, daily weights    #A fib RVR  :: rate controlled with metoprolol succinate, on chronic xarelto 20 mg daily  -continue home meds    #Abdominal Pain  :: CT A/P No evidence of acute pathology within the abdomen and pelvis. Incidental findings of atrophy of the left hepatic lobe, chronic  pancreatitis, and nonobstructing bilateral nephrolithiasis as described above  :: UA bland, LFTs wnl  -ctm, suspect may be s/t constipation as patient states he not regular. Low suspicion for acute pancreatitis flare. Could also be component acid reflux  vs PUD as improves with PPI  -miralax daily  -needs outpatient endoscopy    #Thoracic Aortic Aneurysm  :: CT PE w/ aneurysmal dilatation ascending thoracic aorta measuring up to 4.5 cm  -follow-up CT/MR angiogram of the chest in 6-12 months is recommended to ensure  stability.    #Normocytic Anemia  :: hgb 9.3, hgb 8-9 since 11/17/23 w/ acute drop from ~14. At that time attributed to blood loss from procedure. No reported melena or BRBPR.   -iron studies  -consider hemolysis labs & bidirectional endoscopy at [facility] as has not recovered  -start iron supplement  -daily cbc    #Depression  -not taking zoloft 100 mg daily    #Chronic pain  -continue methadone 20 mg q12    #tobacco use disorder  -nicotine patches    #Social  -would benefit from home health care    F: diuresing  E: K>4, Mg>2  N: Na restricted  A: PIV  GI ppx: home PPI  DVT ppx: home  hina SORIA: Daughter Kelsey [phone]  Code Status: Full code    Dispo: admit to medicine    Brenna Stanton MD

## 2024-02-24 ENCOUNTER — APPOINTMENT (OUTPATIENT)
Dept: CARDIOLOGY | Facility: HOSPITAL | Age: 74
DRG: 292 | End: 2024-02-24
Payer: MEDICARE

## 2024-02-24 LAB
ALBUMIN SERPL BCP-MCNC: 3.1 G/DL (ref 3.4–5)
ALBUMIN SERPL BCP-MCNC: 3.9 G/DL (ref 3.4–5)
ANION GAP SERPL CALC-SCNC: 11 MMOL/L (ref 10–20)
ANION GAP SERPL CALC-SCNC: 14 MMOL/L (ref 10–20)
APTT PPP: 29 SECONDS (ref 27–38)
BASOPHILS # BLD AUTO: 0.04 X10*3/UL (ref 0–0.1)
BASOPHILS NFR BLD AUTO: 0.6 %
BUN SERPL-MCNC: 14 MG/DL (ref 6–23)
BUN SERPL-MCNC: 14 MG/DL (ref 6–23)
CALCIUM SERPL-MCNC: 7.1 MG/DL (ref 8.6–10.6)
CALCIUM SERPL-MCNC: 8.4 MG/DL (ref 8.6–10.6)
CHLORIDE SERPL-SCNC: 105 MMOL/L (ref 98–107)
CHLORIDE SERPL-SCNC: 111 MMOL/L (ref 98–107)
CO2 SERPL-SCNC: 23 MMOL/L (ref 21–32)
CO2 SERPL-SCNC: 25 MMOL/L (ref 21–32)
CREAT SERPL-MCNC: 0.87 MG/DL (ref 0.5–1.3)
CREAT SERPL-MCNC: 0.95 MG/DL (ref 0.5–1.3)
EGFRCR SERPLBLD CKD-EPI 2021: 85 ML/MIN/1.73M*2
EGFRCR SERPLBLD CKD-EPI 2021: >90 ML/MIN/1.73M*2
EOSINOPHIL # BLD AUTO: 0.2 X10*3/UL (ref 0–0.4)
EOSINOPHIL NFR BLD AUTO: 3.1 %
ERYTHROCYTE [DISTWIDTH] IN BLOOD BY AUTOMATED COUNT: 16.4 % (ref 11.5–14.5)
FERRITIN SERPL-MCNC: 19 NG/ML (ref 20–300)
GLUCOSE SERPL-MCNC: 107 MG/DL (ref 74–99)
GLUCOSE SERPL-MCNC: 110 MG/DL (ref 74–99)
HCT VFR BLD AUTO: 33.6 % (ref 41–52)
HGB BLD-MCNC: 9.9 G/DL (ref 13.5–17.5)
HOLD SPECIMEN: NORMAL
HOLD SPECIMEN: NORMAL
IMM GRANULOCYTES # BLD AUTO: 0.06 X10*3/UL (ref 0–0.5)
IMM GRANULOCYTES NFR BLD AUTO: 0.9 % (ref 0–0.9)
INR PPP: 1.3 (ref 0.9–1.1)
IRON SATN MFR SERPL: 6 % (ref 25–45)
IRON SERPL-MCNC: 27 UG/DL (ref 35–150)
LYMPHOCYTES # BLD AUTO: 1.28 X10*3/UL (ref 0.8–3)
LYMPHOCYTES NFR BLD AUTO: 19.5 %
MAGNESIUM SERPL-MCNC: 1.54 MG/DL (ref 1.6–2.4)
MAGNESIUM SERPL-MCNC: 1.84 MG/DL (ref 1.6–2.4)
MCH RBC QN AUTO: 26.1 PG (ref 26–34)
MCHC RBC AUTO-ENTMCNC: 29.5 G/DL (ref 32–36)
MCV RBC AUTO: 88 FL (ref 80–100)
MONOCYTES # BLD AUTO: 0.6 X10*3/UL (ref 0.05–0.8)
MONOCYTES NFR BLD AUTO: 9.2 %
NEUTROPHILS # BLD AUTO: 4.37 X10*3/UL (ref 1.6–5.5)
NEUTROPHILS NFR BLD AUTO: 66.7 %
NRBC BLD-RTO: 0 /100 WBCS (ref 0–0)
PHOSPHATE SERPL-MCNC: 2.9 MG/DL (ref 2.5–4.9)
PHOSPHATE SERPL-MCNC: 3.7 MG/DL (ref 2.5–4.9)
PLATELET # BLD AUTO: 224 X10*3/UL (ref 150–450)
POTASSIUM SERPL-SCNC: 3.4 MMOL/L (ref 3.5–5.3)
POTASSIUM SERPL-SCNC: 3.8 MMOL/L (ref 3.5–5.3)
PROTHROMBIN TIME: 14.9 SECONDS (ref 9.8–12.8)
RBC # BLD AUTO: 3.8 X10*6/UL (ref 4.5–5.9)
SODIUM SERPL-SCNC: 140 MMOL/L (ref 136–145)
SODIUM SERPL-SCNC: 142 MMOL/L (ref 136–145)
TIBC SERPL-MCNC: 421 UG/DL (ref 240–445)
UIBC SERPL-MCNC: 394 UG/DL (ref 110–370)
WBC # BLD AUTO: 6.6 X10*3/UL (ref 4.4–11.3)

## 2024-02-24 PROCEDURE — 99222 1ST HOSP IP/OBS MODERATE 55: CPT | Performed by: STUDENT IN AN ORGANIZED HEALTH CARE EDUCATION/TRAINING PROGRAM

## 2024-02-24 PROCEDURE — 2500000004 HC RX 250 GENERAL PHARMACY W/ HCPCS (ALT 636 FOR OP/ED)

## 2024-02-24 PROCEDURE — 83540 ASSAY OF IRON: CPT

## 2024-02-24 PROCEDURE — 36415 COLL VENOUS BLD VENIPUNCTURE: CPT

## 2024-02-24 PROCEDURE — 80069 RENAL FUNCTION PANEL: CPT

## 2024-02-24 PROCEDURE — 83735 ASSAY OF MAGNESIUM: CPT

## 2024-02-24 PROCEDURE — S4991 NICOTINE PATCH NONLEGEND: HCPCS

## 2024-02-24 PROCEDURE — 2500000001 HC RX 250 WO HCPCS SELF ADMINISTERED DRUGS (ALT 637 FOR MEDICARE OP)

## 2024-02-24 PROCEDURE — 93005 ELECTROCARDIOGRAM TRACING: CPT

## 2024-02-24 PROCEDURE — 85025 COMPLETE CBC W/AUTO DIFF WBC: CPT

## 2024-02-24 PROCEDURE — 1200000002 HC GENERAL ROOM WITH TELEMETRY DAILY

## 2024-02-24 PROCEDURE — 85730 THROMBOPLASTIN TIME PARTIAL: CPT

## 2024-02-24 PROCEDURE — 93010 ELECTROCARDIOGRAM REPORT: CPT | Performed by: INTERNAL MEDICINE

## 2024-02-24 PROCEDURE — 99223 1ST HOSP IP/OBS HIGH 75: CPT | Performed by: INTERNAL MEDICINE

## 2024-02-24 PROCEDURE — 82728 ASSAY OF FERRITIN: CPT

## 2024-02-24 PROCEDURE — S0109 METHADONE ORAL 5MG: HCPCS

## 2024-02-24 PROCEDURE — 2500000002 HC RX 250 W HCPCS SELF ADMINISTERED DRUGS (ALT 637 FOR MEDICARE OP, ALT 636 FOR OP/ED)

## 2024-02-24 RX ORDER — FUROSEMIDE 10 MG/ML
80 INJECTION INTRAMUSCULAR; INTRAVENOUS ONCE
Status: DISCONTINUED | OUTPATIENT
Start: 2024-02-24 | End: 2024-02-24

## 2024-02-24 RX ORDER — TAMSULOSIN HYDROCHLORIDE 0.4 MG/1
0.4 CAPSULE ORAL DAILY
Status: DISCONTINUED | OUTPATIENT
Start: 2024-02-24 | End: 2024-03-04 | Stop reason: HOSPADM

## 2024-02-24 RX ORDER — POTASSIUM CHLORIDE 1.5 G/1.58G
20 POWDER, FOR SOLUTION ORAL ONCE
Status: COMPLETED | OUTPATIENT
Start: 2024-02-24 | End: 2024-02-24

## 2024-02-24 RX ORDER — MAGNESIUM SULFATE HEPTAHYDRATE 40 MG/ML
2 INJECTION, SOLUTION INTRAVENOUS ONCE
Status: COMPLETED | OUTPATIENT
Start: 2024-02-24 | End: 2024-02-24

## 2024-02-24 RX ORDER — FUROSEMIDE 10 MG/ML
40 INJECTION INTRAMUSCULAR; INTRAVENOUS ONCE
Status: COMPLETED | OUTPATIENT
Start: 2024-02-24 | End: 2024-02-24

## 2024-02-24 RX ORDER — POTASSIUM CHLORIDE 1.5 G/1.58G
40 POWDER, FOR SOLUTION ORAL ONCE
Status: COMPLETED | OUTPATIENT
Start: 2024-02-24 | End: 2024-02-24

## 2024-02-24 RX ORDER — LIDOCAINE 560 MG/1
1 PATCH PERCUTANEOUS; TOPICAL; TRANSDERMAL ONCE AS NEEDED
Status: COMPLETED | OUTPATIENT
Start: 2024-02-24 | End: 2024-02-28

## 2024-02-24 RX ORDER — ACETAMINOPHEN 325 MG/1
975 TABLET ORAL 3 TIMES DAILY PRN
Status: DISCONTINUED | OUTPATIENT
Start: 2024-02-24 | End: 2024-03-04 | Stop reason: HOSPADM

## 2024-02-24 RX ORDER — DICLOFENAC SODIUM 10 MG/G
4 GEL TOPICAL 4 TIMES DAILY PRN
Status: DISCONTINUED | OUTPATIENT
Start: 2024-02-24 | End: 2024-03-04 | Stop reason: HOSPADM

## 2024-02-24 RX ORDER — FERROUS SULFATE 325(65) MG
65 TABLET ORAL
Status: DISCONTINUED | OUTPATIENT
Start: 2024-02-24 | End: 2024-03-04 | Stop reason: HOSPADM

## 2024-02-24 RX ADMIN — OXYCODONE HYDROCHLORIDE AND ACETAMINOPHEN 1000 MG: 500 TABLET ORAL at 10:33

## 2024-02-24 RX ADMIN — RIVAROXABAN 20 MG: 20 TABLET, FILM COATED ORAL at 17:33

## 2024-02-24 RX ADMIN — POTASSIUM CHLORIDE 40 MEQ: 1.5 POWDER, FOR SOLUTION ORAL at 14:09

## 2024-02-24 RX ADMIN — FUROSEMIDE 40 MG: 10 INJECTION, SOLUTION INTRAMUSCULAR; INTRAVENOUS at 11:33

## 2024-02-24 RX ADMIN — FUROSEMIDE 40 MG: 10 INJECTION, SOLUTION INTRAMUSCULAR; INTRAVENOUS at 09:20

## 2024-02-24 RX ADMIN — ATORVASTATIN CALCIUM 40 MG: 40 TABLET, FILM COATED ORAL at 20:11

## 2024-02-24 RX ADMIN — METHADONE HYDROCHLORIDE 20 MG: 10 TABLET ORAL at 20:11

## 2024-02-24 RX ADMIN — ASPIRIN 81 MG: 81 TABLET, COATED ORAL at 10:33

## 2024-02-24 RX ADMIN — NICOTINE 1 PATCH: 14 PATCH, EXTENDED RELEASE TRANSDERMAL at 11:35

## 2024-02-24 RX ADMIN — PANTOPRAZOLE SODIUM 20 MG: 20 TABLET, DELAYED RELEASE ORAL at 09:19

## 2024-02-24 RX ADMIN — POTASSIUM CHLORIDE 20 MEQ: 1.5 POWDER, FOR SOLUTION ORAL at 09:19

## 2024-02-24 RX ADMIN — TAMSULOSIN HYDROCHLORIDE 0.4 MG: 0.4 CAPSULE ORAL at 09:19

## 2024-02-24 RX ADMIN — MAGNESIUM SULFATE HEPTAHYDRATE 2 G: 40 INJECTION, SOLUTION INTRAVENOUS at 09:19

## 2024-02-24 RX ADMIN — LISINOPRIL 5 MG: 5 TABLET ORAL at 09:19

## 2024-02-24 RX ADMIN — FERROUS SULFATE TAB 325 MG (65 MG ELEMENTAL FE) 1 TABLET: 325 (65 FE) TAB at 09:20

## 2024-02-24 RX ADMIN — MAGNESIUM SULFATE HEPTAHYDRATE 2 G: 40 INJECTION, SOLUTION INTRAVENOUS at 14:09

## 2024-02-24 RX ADMIN — FUROSEMIDE 40 MG: 10 INJECTION, SOLUTION INTRAMUSCULAR; INTRAVENOUS at 17:33

## 2024-02-24 RX ADMIN — METHADONE HYDROCHLORIDE 20 MG: 10 TABLET ORAL at 10:33

## 2024-02-24 ASSESSMENT — PAIN SCALES - GENERAL
PAINLEVEL_OUTOF10: 4
PAINLEVEL_OUTOF10: 6

## 2024-02-24 ASSESSMENT — ACTIVITIES OF DAILY LIVING (ADL)
DRESSING YOURSELF: INDEPENDENT
HEARING - RIGHT EAR: DIFFICULTY WITH NOISE
GROOMING: INDEPENDENT
HEARING - LEFT EAR: DIFFICULTY WITH NOISE
JUDGMENT_ADEQUATE_SAFELY_COMPLETE_DAILY_ACTIVITIES: YES
ASSISTIVE_DEVICE: EYEGLASSES
LACK_OF_TRANSPORTATION: YES
WALKS IN HOME: INDEPENDENT
FEEDING YOURSELF: INDEPENDENT
PATIENT'S MEMORY ADEQUATE TO SAFELY COMPLETE DAILY ACTIVITIES?: YES
BATHING: INDEPENDENT
TOILETING: INDEPENDENT
ADEQUATE_TO_COMPLETE_ADL: YES

## 2024-02-24 ASSESSMENT — COGNITIVE AND FUNCTIONAL STATUS - GENERAL
CLIMB 3 TO 5 STEPS WITH RAILING: A LITTLE
MOBILITY SCORE: 23
DAILY ACTIVITIY SCORE: 24

## 2024-02-24 ASSESSMENT — COLUMBIA-SUICIDE SEVERITY RATING SCALE - C-SSRS
6. HAVE YOU EVER DONE ANYTHING, STARTED TO DO ANYTHING, OR PREPARED TO DO ANYTHING TO END YOUR LIFE?: NO
1. IN THE PAST MONTH, HAVE YOU WISHED YOU WERE DEAD OR WISHED YOU COULD GO TO SLEEP AND NOT WAKE UP?: NO
2. HAVE YOU ACTUALLY HAD ANY THOUGHTS OF KILLING YOURSELF?: NO

## 2024-02-24 ASSESSMENT — PAIN - FUNCTIONAL ASSESSMENT: PAIN_FUNCTIONAL_ASSESSMENT: 0-10

## 2024-02-24 ASSESSMENT — PAIN DESCRIPTION - DESCRIPTORS: DESCRIPTORS: CRAMPING

## 2024-02-24 NOTE — PROGRESS NOTES
"Marco Antonio Montalvo is a 73 y.o. male on day 1 of admission presenting with Acute on chronic systolic (congestive) heart failure (CMS/HCC).    Subjective   Patient bradycardic to 38 last night, asymptomatic. Otherwise this morning states he was not able to sleep as he is still very short of breath and has to sleep on several pillows. He denies CP. Does endorse CVA pain and feeling like he is hit a block when trying to urinate.     On rounds this AM very tearful and perseverating on past war experiences. He is interested in speaking with psychiatry.    Objective     Physical Exam  Physical Exam  Constitutional: NAD, seen sitting up eating breakfast  HEENT: EOMI, no scleral icterus, MMM  CV: regular, no m/r/g  Pulm: mild shortness of breath while speaking, diminished in bilateral bases  GI: Soft, nontender, non distended  Extremities: 3+ pitting edema bilaterally, firm  Skin: warm  Neuro: grossly alert and oriented    Last Recorded Vitals  Blood pressure 135/73, pulse 51, temperature 36.1 °C (97 °F), resp. rate 18, height 1.778 m (5' 10\"), weight 101 kg (223 lb 8.7 oz), SpO2 94 %.  Intake/Output last 3 Shifts:  I/O last 3 completed shifts:  In: - (0 mL/kg)   Out: 1150 (11.3 mL/kg) [Urine:1150 (0.3 mL/kg/hr)]  Weight: 101.4 kg     Relevant Results  Results for orders placed or performed during the hospital encounter of 02/23/24 (from the past 24 hour(s))   CBC and Auto Differential   Result Value Ref Range    WBC 6.2 4.4 - 11.3 x10*3/uL    nRBC 0.0 0.0 - 0.0 /100 WBCs    RBC 3.63 (L) 4.50 - 5.90 x10*6/uL    Hemoglobin 9.3 (L) 13.5 - 17.5 g/dL    Hematocrit 29.0 (L) 41.0 - 52.0 %    MCV 80 80 - 100 fL    MCH 25.6 (L) 26.0 - 34.0 pg    MCHC 32.1 32.0 - 36.0 g/dL    RDW 16.2 (H) 11.5 - 14.5 %    Platelets 206 150 - 450 x10*3/uL    Neutrophils % 67.6 40.0 - 80.0 %    Immature Granulocytes %, Automated 0.2 0.0 - 0.9 %    Lymphocytes % 19.0 13.0 - 44.0 %    Monocytes % 10.6 2.0 - 10.0 %    Eosinophils % 2.1 0.0 - 6.0 %    " Basophils % 0.5 0.0 - 2.0 %    Neutrophils Absolute 4.16 1.60 - 5.50 x10*3/uL    Immature Granulocytes Absolute, Automated 0.01 0.00 - 0.50 x10*3/uL    Lymphocytes Absolute 1.17 0.80 - 3.00 x10*3/uL    Monocytes Absolute 0.65 0.05 - 0.80 x10*3/uL    Eosinophils Absolute 0.13 0.00 - 0.40 x10*3/uL    Basophils Absolute 0.03 0.00 - 0.10 x10*3/uL   Comprehensive Metabolic Panel   Result Value Ref Range    Glucose 95 74 - 99 mg/dL    Sodium 140 136 - 145 mmol/L    Potassium 3.9 3.5 - 5.3 mmol/L    Chloride 107 98 - 107 mmol/L    Bicarbonate 23 21 - 32 mmol/L    Anion Gap 14 10 - 20 mmol/L    Urea Nitrogen 13 6 - 23 mg/dL    Creatinine 0.91 0.50 - 1.30 mg/dL    eGFR 89 >60 mL/min/1.73m*2    Calcium 8.4 (L) 8.6 - 10.6 mg/dL    Albumin 3.7 3.4 - 5.0 g/dL    Alkaline Phosphatase 122 33 - 136 U/L    Total Protein 6.4 6.4 - 8.2 g/dL    AST 27 9 - 39 U/L    Bilirubin, Total 0.6 0.0 - 1.2 mg/dL    ALT 38 10 - 52 U/L   Troponin I, High Sensitivity   Result Value Ref Range    Troponin I, High Sensitivity 22 0 - 53 ng/L   B-Type Natriuretic Peptide   Result Value Ref Range     (H) 0 - 99 pg/mL   Blood Gas Venous Full Panel   Result Value Ref Range    POCT pH, Venous 7.35 7.33 - 7.43 pH    POCT pCO2, Venous 46 41 - 51 mm Hg    POCT pO2, Venous 48 (H) 35 - 45 mm Hg    POCT SO2, Venous 68 45 - 75 %    POCT Oxy Hemoglobin, Venous 65.7 45.0 - 75.0 %    POCT Hematocrit Calculated, Venous 29.0 (L) 41.0 - 52.0 %    POCT Sodium, Venous 138 136 - 145 mmol/L    POCT Potassium, Venous 4.3 3.5 - 5.3 mmol/L    POCT Chloride, Venous 107 98 - 107 mmol/L    POCT Ionized Calicum, Venous 1.18 1.10 - 1.33 mmol/L    POCT Glucose, Venous 104 (H) 74 - 99 mg/dL    POCT Lactate, Venous 1.4 0.4 - 2.0 mmol/L    POCT Base Excess, Venous -0.4 -2.0 - 3.0 mmol/L    POCT HCO3 Calculated, Venous 25.4 22.0 - 26.0 mmol/L    POCT Hemoglobin, Venous 9.7 (L) 13.5 - 17.5 g/dL    POCT Anion Gap, Venous 10.0 10.0 - 25.0 mmol/L    Patient Temperature 37.0 degrees  Celsius    FiO2 0 %   Coagulation Screen   Result Value Ref Range    Protime 15.8 (H) 9.8 - 12.8 seconds    INR 1.4 (H) 0.9 - 1.1    aPTT 32 27 - 38 seconds   Magnesium   Result Value Ref Range    Magnesium 1.46 (L) 1.60 - 2.40 mg/dL   Urinalysis with Reflex Culture and Microscopic   Result Value Ref Range    Color, Urine Light-Yellow Light-Yellow, Yellow, Dark-Yellow    Appearance, Urine Clear Clear    Specific Gravity, Urine 1.008 1.005 - 1.035    pH, Urine 5.5 5.0, 5.5, 6.0, 6.5, 7.0, 7.5, 8.0    Protein, Urine 50 (1+) (A) NEGATIVE, 10 (TRACE), 20 (TRACE) mg/dL    Glucose, Urine Normal Normal mg/dL    Blood, Urine NEGATIVE NEGATIVE    Ketones, Urine NEGATIVE NEGATIVE mg/dL    Bilirubin, Urine NEGATIVE NEGATIVE    Urobilinogen, Urine Normal Normal mg/dL    Nitrite, Urine NEGATIVE NEGATIVE    Leukocyte Esterase, Urine NEGATIVE NEGATIVE   Extra Urine Gray Tube   Result Value Ref Range    Extra Tube Hold for add-ons.    Urinalysis Microscopic   Result Value Ref Range    WBC, Urine 1-5 1-5, NONE /HPF    RBC, Urine NONE NONE, 1-2, 3-5 /HPF    Squamous Epithelial Cells, Urine 1-9 (SPARSE) Reference range not established. /HPF    Mucus, Urine FEW Reference range not established. /LPF   Renal function panel   Result Value Ref Range    Glucose 110 (H) 74 - 99 mg/dL    Sodium 140 136 - 145 mmol/L    Potassium 3.8 3.5 - 5.3 mmol/L    Chloride 105 98 - 107 mmol/L    Bicarbonate 25 21 - 32 mmol/L    Anion Gap 14 10 - 20 mmol/L    Urea Nitrogen 14 6 - 23 mg/dL    Creatinine 0.95 0.50 - 1.30 mg/dL    eGFR 85 >60 mL/min/1.73m*2    Calcium 8.4 (L) 8.6 - 10.6 mg/dL    Phosphorus 3.7 2.5 - 4.9 mg/dL    Albumin 3.9 3.4 - 5.0 g/dL   Magnesium   Result Value Ref Range    Magnesium 1.84 1.60 - 2.40 mg/dL   Iron and TIBC   Result Value Ref Range    Iron 27 (L) 35 - 150 ug/dL    UIBC 394 (H) 110 - 370 ug/dL    TIBC 421 240 - 445 ug/dL    % Saturation 6 (L) 25 - 45 %   Ferritin   Result Value Ref Range    Ferritin 19 (L) 20 - 300 ng/mL    Green Top   Result Value Ref Range    Extra Tube Hold for add-ons.       CTPE  IMPRESSION:  1. No evidence of acute pulmonary embolism.  2. Dilated main pulmonary artery along with reflux of intravenous  contrast into upper SVC and central hepatic veins. Correlate with  concern for pulmonary hypertension and elevated right heart pressures.  3. Suggestion of mild left ventricular and left atrial enlargement  and correlate with echocardiogram.  4. Aneurysmal dilatation ascending thoracic aorta measuring up to 4.5  cm. Further evaluation is limited due to near noncontrast  opacification on present pulmonary arterial phase scan. A follow-up  CT/MR angiogram of the chest in 6-12 months is recommended to ensure  stability.  5. Severe coronary artery calcifications, status post CABG. Please  note that, the present study is not tailored for evaluation of  coronary arteries or bypass grafts.  6. Multiple prominent to mildly enlarged mediastinal lymph nodes,  which are nonspecific, but may be reactive in nature. Attention on  follow-up imaging is recommended.  7. Patchy mosaic attenuation within bilateral lungs, which may  represent a component of small vessel/small airway disease.    CT A/P  IMPRESSION:  1. No evidence of acute pathology within the abdomen and pelvis.  2. Please refer to the same-day CTA chest regarding intrathoracic  findings.  3. Incidental findings of atrophy of the left hepatic lobe, chronic  pancreatitis, and nonobstructing bilateral nephrolithiasis as  described above.    Assessment/Plan   Principal Problem:    Acute on chronic systolic (congestive) heart failure (CMS/HCC)  Active Problems:    Acute on chronic systolic congestive heart failure (CMS/HCC)    Marco Antonio Montalvo is a 73 y.o. male PMH HFrEF (EF 48% in 11/23), CAD s/p PCI in 2001 and CABG x3 (LIMA-LAD, SVG-PDA, SVG-D2) 11/2023, A-fib RVR on xaretlo, cholelithiasis, nephrolithiasis c/b pyelonephritis, PTSD, depression, chronic pain and GERD  who presents today with several days of worsening shortness of breath and abdominal pain. Worsening HUNTER, LE edema and orthopnea in setting of elevated BNP and vascular congestion on imaging. Will continue to diurese for suspected acute decompensation heart failure iso medication noncompliance.     Updates:  -repeat ECG  -TTE ordered  -s/p 80 mg IV lasix yesterday, give additional 40 this AM and reassess later  -Start entresto, add on GDM as BP tolerates  -iron studies consistent with iron deficiency anemia, start iron supplement  -psych consulted, appreciate assistance     #Acute on Chronic Systolic HF (EF 48%) s/t medication noncompliance  #CAD s/p CABG (11/23)  :: TTTE 11/23 w/ EF 48% post-CABG  :: dry weight ~190lbs, admission weight 210 lbs.  :: trop negative, BNP elevated at 219 (2000s at OSH)  :: supposed to be on torsemide 20 mg daily at home, metoprolol succinate ER 25 mg daily, lisinopril 5 daily  :: CXR w/ cardiomegaly and vascular congestion  :: CTPE w/ did note dilated main pulmonary artery, mild left ventricular and left atrial enlargement, a multiple prominent to mildly enlarged mediastinal lymph nodes, and patchy mosaic attenuation within bilateral lungs, which may represent a component of small vessel/small airway disease.  :: PA Systolic pressure 33 in 11/23 on PA cath, though in setting of diuresis  :: PFTs 11/23 with normal FEV1 and FVC but reduced ratio suggestive of mild obstructive pattern or normal variant. Significant bronchodilator response. Normal DLCO.  -continue home atorvastatin 40 mg  -hold home metoprolol succinate given bradycardia  -start entresto, gradually add GDMT as tolerated  -s/p 80 IV lasix, assess diureses needs daily  -goal NN 1.5-2L  -BID rfp, mg  -strict I&O's, daily weights     #A fib w/ bradycardia  :: home meds- metoprolol succinate, xarelto 20 mg daily. Has not been taking.  -hold metoprolol, continue xarelto  -CTM, if concern for sxs bradycardia may need to discuss  PPM     #Abdominal Pain  :: CT A/P No evidence of acute pathology within the abdomen and pelvis. Incidental findings of atrophy of the left hepatic lobe, chronic  pancreatitis, and nonobstructing bilateral nephrolithiasis as described above  :: UA bland, LFTs wnl  -ctm, suspect may be s/t constipation as patient states he not regular. Low suspicion for acute pancreatitis flare. Could also be component acid reflux  vs PUD as improves with PPI  -continue PPI  -miralax daily  -needs outpatient endoscopy     #Thoracic Aortic Aneurysm  :: CT PE w/ aneurysmal dilatation ascending thoracic aorta measuring up to 4.5 cm  -follow-up CT/MR angiogram of the chest in 6-12 months is recommended to ensure  stability.     #SHON  :: hgb 9.3, hgb 8-9 since 11/17/23 w/ acute drop from ~14. At that time attributed to blood loss from procedure. No reported melena or BRBPR.   :: Iron 27, UIBC 394, TIBC 421, % sat 6, ferritin 6  -start iron supplement  -daily cbc     #Depression vs Bipolar 1  #PTSD  #Social  :: patient teary and perseverating on time in   -not taking zoloft 100 mg daily  -psych consulted, f/up recs  -would benefit from home health care     #Chronic pain  -continue methadone 20 mg q12     #tobacco use disorder  -nicotine patches     F: diuresing  E: K>4, Mg>2  N: Na restricted  A: PIV  GI ppx: home PPI  DVT ppx: home xarelto  NOK: Daughter Kelsey [phone]  Code Status: Full code     Dispo: admit to medicine    Brenna Stanton MD
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
## 2024-02-24 NOTE — SIGNIFICANT EVENT
Senior staffing note  Marco Antonio Montalvo is a 73 year old male with pmhx remote history CAD s/p PCI in 2001, AF RVR 8/2023 (xarelto), HFrEF 30%, Nephrolithiasis, gallstones, PTSD, depression, chronic pain, stomach pain & GERD who presented with increasing SOB and abdominal pain. Reported worsening of symptoms last 4 days, pt endorses that he was not able to take his medications. Pt was in [place] yesterday for the same complain and left AMA. On interview the pt reported SOB for 3-4 days,     In the ED pt was stable, labs bnp 219, Cxr from yesterday showed mild vascular congestion, CTPE with no PE unremarkable, was given Lasix 40mg.     PMH/PSH  - CAD s/p PCI in 2001  - Afib w/ RVR in August 2023 on Xarelto  - HFrEF with EF 35% diagnosed in August  - Nephrolithiasis in August 2023  - Gallstones    ALLERGIES  No Known Allergies    Constitutional: Patient does not appear to be in any acute distress, AOx4  HEENT: NCAT, normal external inspection of ears and nose. Oropharynx normal.  Cardio: RRR, S1/S2, no murmurs, rubs, or gallops, radial pulses +2,   Pulm: bilateral lower lobe crackles   GI: +BS, soft, non-tender, nondistended, no guarding or rebound, no masses noted  MSK: No joint swelling, normal movements of all extremities. Normal ROM, 5/5 strength  Skin: No lesions, contusions, or erythema.  Extremities: +3 edema   Psych: Appropriate mood and behavior    Assessment and plan   Marco Antonio Montalvo is a 73 year old male with pmhx remote history CAD s/p PCI in 2001, AF RVR 8/2023 (xarelto), HFrEF 30%, Nephrolithiasis, gallstones, PTSD, depression, chronic pain, stomach pain & GERD who presented with increasing SOB, admitted to cardiology as CHF for further management     # HFrEF with EF 35% 2/2 medication non compliance   #CAD s/p PCI in 2001  #Afib w/ RVR in August 2023 on Xarelto  #  CABG November 2023     -trop negative, bnp 219,  -bilateral lower extrimity edema, bilateral lower lobe crackles   -Cxr from yesterday showed  mild vascular congestion,   -CTPE with no PE unremarkable, was given Lasix 40mg.     Plan:  -Strict I&os   -furosamide 40mg once  -monitor RFP   -2gram of mg   -hold home metoprolol succinate ER 25 mg daily   -c/w atorvastatin and Lisinopril     #Abdominal pain   #chronic pancreatitis   #GERD   Abdomen CT:  1. No evidence of acute pathology within the abdomen and pelvis.  2. Please refer to the same-day CTA chest regarding intrathoracic  findings.  3. Incidental findings of atrophy of the left hepatic lobe, chronic  pancreatitis, and nonobstructing bilateral nephrolithiasis as  described above.  -c/w PPI   -UA pending     Code status: Full code     Please see the excellent intern note for the comprehensive H&P.

## 2024-02-24 NOTE — PROGRESS NOTES
Pharmacy Medication History Review    Marco Antonio Montalvo is a 73 y.o. male admitted for Acute on chronic systolic (congestive) heart failure (CMS/Formerly [facility]). Pharmacy reviewed the patient's [id number] medications and allergies for accuracy.    The list below reflects the updated PTA list. Comments regarding how patient may be taking medications differently can be found in the Admit Orders Activity  Prior to Admission Medications   Prescriptions Last Dose Informant   albuterol 90 mcg/actuation inhaler Unknown Self   Sig: Inhale 2 puffs every 6 hours if needed for shortness of breath.   ascorbic acid (Vitamin C) 500 mg tablet 2/22/2024 Self   Sig: Take 1 tablet (500 mg) by mouth once daily.   atorvastatin (Lipitor) 40 mg tablet 2/22/2024 Self   Sig: Take 1 tablet (40 mg) by mouth once daily.   calcium carbonate-vitamin D3 500 mg-5 mcg (200 unit) tablet 2/22/2024 Self   Sig: Take 1 tablet by mouth once daily.   cholecalciferol (Vitamin D-3) 25 MCG (1000 UT) capsule 2/22/2024 Self   Sig: Take 1 capsule (25 mcg) by mouth once daily.   furosemide (Lasix) 20 mg tablet 2/22/2024 Self   Sig: Take 1 tablet (20 mg) by mouth once daily.   lisinopril 5 mg tablet Unknown Self   Sig: Take 1 tablet (5 mg) by mouth once daily.   methadone (Dolophine) 10 mg tablet 2/22/2024 Self   Sig: Take 2 tablets (20 mg) by mouth every 12 hours.   metoprolol succinate XL (Toprol-XL) 25 mg 24 hr tablet 2/22/2024 Self   Sig: Take 1 tablet (25 mg) by mouth once daily.   pantoprazole (ProtoNix) 20 mg EC tablet 2/22/2024 Self   Sig: Take 1 tablet (20 mg) by mouth once daily in the morning. Take before meals. Do not crush, chew, or split.   rivaroxaban (Xarelto) 20 mg tablet Past Week Self   Sig: Take 1 tablet (20 mg) by mouth once daily in the evening. Take with meals.   tiotropium (Spiriva Respimat) 2.5 mcg/actuation inhaler 2/22/2024 Self   Sig: Inhale 2 puffs once daily.   torsemide (Demadex) 20 mg tablet Unknown Self   Sig: Take 1 tablet (20 mg)  "by mouth once daily.      Facility-Administered Medications: None        The list below reflects the updated allergy list. Please review each documented allergy for additional clarification and justification.  Allergies  Reviewed by Alisa Cee RN on 2/23/2024        Severity Reactions Comments    Morphine Not Specified Diarrhea, Nausea Only Other reaction(s): Abdominal bloating            Patient declines M2B at discharge. Pharmacy has been updated to [facility].    Sources used to complete the med history include [facility] medication list, OARRS, and patient interview. The patient brought in prescription medication bottle and inhalers.     Below are additional concerns with the patient's PTA list.  - There are empty pill bottles for Xarelto, lisinopril, and torsemide. When asked how long her has been out of these medications he reported that he has been out of Xarelto for 2-3 days and is unsure how long he has gone without lisinopril and torsemide.  - Recently prescribed Lasix. When asked about this medication he reported \"I took 5 of those and they do not work\".  - Does not always use Spiriva inhaler. Stated he used to use it regularly years ago and has just started to use again.  - Per [facility] medication list he was previously using colchicine, aspirin, and sertraline. Currently does not have any prescription bottles of these medications, and is unsure how long he has been without them. Unclear if he should still be using these medications or was instructed to stop taking them.  - Methadone dose and instructions confirmed with current pill bottle.    Jason Linda PharmD  [facility] PGY1 Pharmacy Resident   [facility]  Please reach out via [employer] Secure Chat for questions, or if no response call [employer] “[employer]”   "

## 2024-02-25 ENCOUNTER — APPOINTMENT (OUTPATIENT)
Dept: CARDIOLOGY | Facility: HOSPITAL | Age: 74
DRG: 292 | End: 2024-02-25
Payer: MEDICARE

## 2024-02-25 LAB
ALBUMIN SERPL BCP-MCNC: 3 G/DL (ref 3.4–5)
ALBUMIN SERPL BCP-MCNC: 3.6 G/DL (ref 3.4–5)
ANION GAP SERPL CALC-SCNC: 13 MMOL/L (ref 10–20)
ANION GAP SERPL CALC-SCNC: 13 MMOL/L (ref 10–20)
ATRIAL RATE: 51 BPM
BASOPHILS # BLD AUTO: 0.02 X10*3/UL (ref 0–0.1)
BASOPHILS NFR BLD AUTO: 0.3 %
BUN SERPL-MCNC: 16 MG/DL (ref 6–23)
BUN SERPL-MCNC: 18 MG/DL (ref 6–23)
CALCIUM SERPL-MCNC: 7.2 MG/DL (ref 8.6–10.6)
CALCIUM SERPL-MCNC: 8.2 MG/DL (ref 8.6–10.6)
CHLORIDE SERPL-SCNC: 105 MMOL/L (ref 98–107)
CHLORIDE SERPL-SCNC: 107 MMOL/L (ref 98–107)
CO2 SERPL-SCNC: 23 MMOL/L (ref 21–32)
CO2 SERPL-SCNC: 24 MMOL/L (ref 21–32)
CREAT SERPL-MCNC: 0.86 MG/DL (ref 0.5–1.3)
CREAT SERPL-MCNC: 1.15 MG/DL (ref 0.5–1.3)
EGFRCR SERPLBLD CKD-EPI 2021: 67 ML/MIN/1.73M*2
EGFRCR SERPLBLD CKD-EPI 2021: >90 ML/MIN/1.73M*2
EOSINOPHIL # BLD AUTO: 0.18 X10*3/UL (ref 0–0.4)
EOSINOPHIL NFR BLD AUTO: 2.7 %
ERYTHROCYTE [DISTWIDTH] IN BLOOD BY AUTOMATED COUNT: 16.1 % (ref 11.5–14.5)
GLUCOSE SERPL-MCNC: 102 MG/DL (ref 74–99)
GLUCOSE SERPL-MCNC: 95 MG/DL (ref 74–99)
HCT VFR BLD AUTO: 31.2 % (ref 41–52)
HGB BLD-MCNC: 9.3 G/DL (ref 13.5–17.5)
IMM GRANULOCYTES # BLD AUTO: 0.01 X10*3/UL (ref 0–0.5)
IMM GRANULOCYTES NFR BLD AUTO: 0.2 % (ref 0–0.9)
LYMPHOCYTES # BLD AUTO: 1.37 X10*3/UL (ref 0.8–3)
LYMPHOCYTES NFR BLD AUTO: 20.9 %
MAGNESIUM SERPL-MCNC: 1.64 MG/DL (ref 1.6–2.4)
MAGNESIUM SERPL-MCNC: 1.9 MG/DL (ref 1.6–2.4)
MCH RBC QN AUTO: 25.2 PG (ref 26–34)
MCHC RBC AUTO-ENTMCNC: 29.8 G/DL (ref 32–36)
MCV RBC AUTO: 85 FL (ref 80–100)
MONOCYTES # BLD AUTO: 0.77 X10*3/UL (ref 0.05–0.8)
MONOCYTES NFR BLD AUTO: 11.7 %
NEUTROPHILS # BLD AUTO: 4.21 X10*3/UL (ref 1.6–5.5)
NEUTROPHILS NFR BLD AUTO: 64.2 %
NRBC BLD-RTO: 0 /100 WBCS (ref 0–0)
PHOSPHATE SERPL-MCNC: 3.4 MG/DL (ref 2.5–4.9)
PHOSPHATE SERPL-MCNC: 3.6 MG/DL (ref 2.5–4.9)
PLATELET # BLD AUTO: 216 X10*3/UL (ref 150–450)
POTASSIUM SERPL-SCNC: 3.4 MMOL/L (ref 3.5–5.3)
POTASSIUM SERPL-SCNC: 4.1 MMOL/L (ref 3.5–5.3)
PR INTERVAL: 624 MS
Q ONSET: 210 MS
QRS COUNT: 8 BEATS
QRS DURATION: 108 MS
QT INTERVAL: 422 MS
QTC CALCULATION(BAZETT): 388 MS
QTC FREDERICIA: 400 MS
R AXIS: -89 DEGREES
RBC # BLD AUTO: 3.69 X10*6/UL (ref 4.5–5.9)
SODIUM SERPL-SCNC: 138 MMOL/L (ref 136–145)
SODIUM SERPL-SCNC: 140 MMOL/L (ref 136–145)
T AXIS: 90 DEGREES
T OFFSET: 421 MS
VENTRICULAR RATE: 51 BPM
WBC # BLD AUTO: 6.6 X10*3/UL (ref 4.4–11.3)

## 2024-02-25 PROCEDURE — 85025 COMPLETE CBC W/AUTO DIFF WBC: CPT

## 2024-02-25 PROCEDURE — 36415 COLL VENOUS BLD VENIPUNCTURE: CPT

## 2024-02-25 PROCEDURE — 83735 ASSAY OF MAGNESIUM: CPT

## 2024-02-25 PROCEDURE — 80069 RENAL FUNCTION PANEL: CPT | Mod: MUE

## 2024-02-25 PROCEDURE — 2500000002 HC RX 250 W HCPCS SELF ADMINISTERED DRUGS (ALT 637 FOR MEDICARE OP, ALT 636 FOR OP/ED): Mod: MUE

## 2024-02-25 PROCEDURE — 99232 SBSQ HOSP IP/OBS MODERATE 35: CPT | Performed by: INTERNAL MEDICINE

## 2024-02-25 PROCEDURE — 2500000001 HC RX 250 WO HCPCS SELF ADMINISTERED DRUGS (ALT 637 FOR MEDICARE OP)

## 2024-02-25 PROCEDURE — 93005 ELECTROCARDIOGRAM TRACING: CPT

## 2024-02-25 PROCEDURE — 2500000004 HC RX 250 GENERAL PHARMACY W/ HCPCS (ALT 636 FOR OP/ED)

## 2024-02-25 PROCEDURE — 2500000002 HC RX 250 W HCPCS SELF ADMINISTERED DRUGS (ALT 637 FOR MEDICARE OP, ALT 636 FOR OP/ED)

## 2024-02-25 PROCEDURE — S0109 METHADONE ORAL 5MG: HCPCS

## 2024-02-25 PROCEDURE — 1200000002 HC GENERAL ROOM WITH TELEMETRY DAILY

## 2024-02-25 PROCEDURE — 80069 RENAL FUNCTION PANEL: CPT

## 2024-02-25 RX ORDER — FUROSEMIDE 10 MG/ML
80 INJECTION INTRAMUSCULAR; INTRAVENOUS ONCE
Status: COMPLETED | OUTPATIENT
Start: 2024-02-25 | End: 2024-02-25

## 2024-02-25 RX ORDER — MIRTAZAPINE 7.5 MG/1
7.5 TABLET, FILM COATED ORAL NIGHTLY
Status: DISCONTINUED | OUTPATIENT
Start: 2024-02-24 | End: 2024-03-02

## 2024-02-25 RX ORDER — QUETIAPINE FUMARATE 25 MG/1
12.5 TABLET, FILM COATED ORAL 2 TIMES DAILY
Status: DISCONTINUED | OUTPATIENT
Start: 2024-02-25 | End: 2024-03-04 | Stop reason: HOSPADM

## 2024-02-25 RX ADMIN — QUETIAPINE FUMARATE 12.5 MG: 25 TABLET ORAL at 20:03

## 2024-02-25 RX ADMIN — PANTOPRAZOLE SODIUM 20 MG: 20 TABLET, DELAYED RELEASE ORAL at 06:13

## 2024-02-25 RX ADMIN — MIRTAZAPINE 7.5 MG: 7.5 TABLET, FILM COATED ORAL at 20:03

## 2024-02-25 RX ADMIN — RIVAROXABAN 20 MG: 20 TABLET, FILM COATED ORAL at 17:33

## 2024-02-25 RX ADMIN — POLYETHYLENE GLYCOL 3350 17 G: 17 POWDER, FOR SOLUTION ORAL at 09:00

## 2024-02-25 RX ADMIN — OXYCODONE HYDROCHLORIDE AND ACETAMINOPHEN 1000 MG: 500 TABLET ORAL at 09:05

## 2024-02-25 RX ADMIN — ATORVASTATIN CALCIUM 40 MG: 40 TABLET, FILM COATED ORAL at 20:04

## 2024-02-25 RX ADMIN — SACUBITRIL AND VALSARTAN 1 TABLET: 24; 26 TABLET, FILM COATED ORAL at 20:04

## 2024-02-25 RX ADMIN — METHADONE HYDROCHLORIDE 20 MG: 10 TABLET ORAL at 06:13

## 2024-02-25 RX ADMIN — DICLOFENAC SODIUM TOPICAL GEL, 1% 4 G: 10 GEL TOPICAL at 06:14

## 2024-02-25 RX ADMIN — TAMSULOSIN HYDROCHLORIDE 0.4 MG: 0.4 CAPSULE ORAL at 09:06

## 2024-02-25 RX ADMIN — METHADONE HYDROCHLORIDE 20 MG: 10 TABLET ORAL at 20:03

## 2024-02-25 RX ADMIN — FERROUS SULFATE TAB 325 MG (65 MG ELEMENTAL FE) 1 TABLET: 325 (65 FE) TAB at 09:05

## 2024-02-25 RX ADMIN — FUROSEMIDE 80 MG: 10 INJECTION, SOLUTION INTRAMUSCULAR; INTRAVENOUS at 12:34

## 2024-02-25 RX ADMIN — ASPIRIN 81 MG: 81 TABLET, COATED ORAL at 09:05

## 2024-02-25 ASSESSMENT — PAIN SCALES - GENERAL
PAINLEVEL_OUTOF10: 4
PAINLEVEL_OUTOF10: 5 - MODERATE PAIN
PAINLEVEL_OUTOF10: 0 - NO PAIN

## 2024-02-25 ASSESSMENT — COGNITIVE AND FUNCTIONAL STATUS - GENERAL
DAILY ACTIVITIY SCORE: 24
MOBILITY SCORE: 23
CLIMB 3 TO 5 STEPS WITH RAILING: A LITTLE
MOBILITY SCORE: 23
DAILY ACTIVITIY SCORE: 24
CLIMB 3 TO 5 STEPS WITH RAILING: A LITTLE

## 2024-02-25 NOTE — HOSPITAL COURSE
Marco Antonio Montalvo is a 73 y.o. male PMH HFmrEF (EF 48% in 11/23), CAD s/p PCI in 2001 and CABG x3 (LIMA-LAD, SVG-PDA, SVG-D2) 11/2023, A-fib RVR on xaretlo, cholelithiasis, nephrolithiasis c/b pyelonephritis, PTSD, depression, chronic pain and GERD who presents today with several days of worsening SOB, orthopnea, LE swelling. And increase in Weight. Reports dry weight ~190s, 220s on admission. Patient initially presented to [facility] in [place] for these concerns, was being diuresed, but left AMA. BNP 2,189, trop 24, procal 0.03. CXR cardiomegaly w/ mild pulmonary vascular congestion. He was discharged with script for lasix (7 days of 20 mg), 5 of which he took day prior to presentation. Daughter usually manages his medications, but she has been unavailable to help him, so he has not taken any medications aside from the lasix for several days.   HDS and afebrile in ED, O2 sat 96% on RA. . CTPE w/o acute PE. Diuresed aggressively for ADHF iso medication noncompliance with metolazone 5mg and lasix 80mg IV w/ improvement in SOB and LE edema. He also endorsed diffuse abdominal pain on admission, so CT A/P completed w/o acute abdominal findings, incidentally noted chronic pancreatitis and non-obstructing bilateral nephrolithiasis. Patient persistently fixated on kidney stones as cause of edema and complaining of left flank pain, so renal US ordered, which showed  0.7 cm nonobstructive calculus within the midpole of the left  kidney. Otherwise, no hydronephrosis bilaterally. UA bland, LFTs wnl, suspect may be s/t constipation as patient states he not regular. Low suspicion for acute pancreatitis flare. Could also be component acid reflux  vs PUD as improves with PPI, plan for further workup as an outpatient.  Patient was seen by psych for anxiety, PTSD. Impression was PTSD and Cluster B personality traits. He was started on seroquel 12.5 BID, but stopped seroquel due to the patient becoming sedated.   The  patient had an increase in Cr on on 3/3 2.11 >3/4 1.5, improving on holding diuresis for a day, we discharged the patient on torsemide 20 mg daily, with PCP follow up for Creatinine and volume status.  The patient was discharge after improvement in Shortness of breath and LL swelling.   The patient is a [employer] and has benefits, [facility]     To Follow-Up:  PCP- Creatinine Trend and volume statues.  PCP- needs repeat CT/MR angiogram of chest for monitoring thoracic aortic aneurysm. in 6-12 months  PCP - continue Evaluation of intermittent non specific abdominal pain  - Urology Referral    - HF follow up

## 2024-02-25 NOTE — PROGRESS NOTES
"Marco Antonio Montalvo is a 73 y.o. male on day 2 of admission presenting with Acute on chronic systolic (congestive) heart failure (CMS/HCC).    Subjective   Bradycardic overnight, asymptomatic. Per report, has been vaping in room, threatening to leave AMA. This AM states he slept some last night but woke up repeatedly due to muscle cramps. Feels like his legs are still very swollen. He adamantly denies that this is secondary to known heart failure and insists it is from his gallbladder and kidney stones.    Objective     Physical Exam  Physical Exam  Constitutional: NAD, seen sitting up eating breakfast  HEENT: EOMI, no scleral icterus, MMM  CV: regular, no m/r/g  Pulm: mild shortness of breath while speaking, diminished in bilateral bases  GI: Soft, nontender, non distended  Extremities: significant edema with compression stockings in place  Skin: warm  Neuro: grossly alert and oriented  Psych: becomes agitated when discussing heart failure diagnosis, perseverating on previous kidney stones and believes they are the cause of his problems    Last Recorded Vitals  Blood pressure 121/63, pulse 55, temperature 36.9 °C (98.4 °F), resp. rate 18, height 1.778 m (5' 10\"), weight 100 kg (220 lb 10.9 oz), SpO2 94 %.  Intake/Output last 3 Shifts:  I/O last 3 completed shifts:  In: - (0 mL/kg)   Out: 1525 (15.2 mL/kg) [Urine:1525 (0.4 mL/kg/hr)]  Weight: 100.1 kg     Relevant Results  Results for orders placed or performed during the hospital encounter of 02/23/24 (from the past 24 hour(s))   Renal function panel   Result Value Ref Range    Glucose 107 (H) 74 - 99 mg/dL    Sodium 142 136 - 145 mmol/L    Potassium 3.4 (L) 3.5 - 5.3 mmol/L    Chloride 111 (H) 98 - 107 mmol/L    Bicarbonate 23 21 - 32 mmol/L    Anion Gap 11 10 - 20 mmol/L    Urea Nitrogen 14 6 - 23 mg/dL    Creatinine 0.87 0.50 - 1.30 mg/dL    eGFR >90 >60 mL/min/1.73m*2    Calcium 7.1 (L) 8.6 - 10.6 mg/dL    Phosphorus 2.9 2.5 - 4.9 mg/dL    Albumin 3.1 (L) 3.4 - " 5.0 g/dL   Magnesium   Result Value Ref Range    Magnesium 1.54 (L) 1.60 - 2.40 mg/dL   CBC and Auto Differential   Result Value Ref Range    WBC 6.6 4.4 - 11.3 x10*3/uL    nRBC 0.0 0.0 - 0.0 /100 WBCs    RBC 3.80 (L) 4.50 - 5.90 x10*6/uL    Hemoglobin 9.9 (L) 13.5 - 17.5 g/dL    Hematocrit 33.6 (L) 41.0 - 52.0 %    MCV 88 80 - 100 fL    MCH 26.1 26.0 - 34.0 pg    MCHC 29.5 (L) 32.0 - 36.0 g/dL    RDW 16.4 (H) 11.5 - 14.5 %    Platelets 224 150 - 450 x10*3/uL    Neutrophils % 66.7 40.0 - 80.0 %    Immature Granulocytes %, Automated 0.9 0.0 - 0.9 %    Lymphocytes % 19.5 13.0 - 44.0 %    Monocytes % 9.2 2.0 - 10.0 %    Eosinophils % 3.1 0.0 - 6.0 %    Basophils % 0.6 0.0 - 2.0 %    Neutrophils Absolute 4.37 1.60 - 5.50 x10*3/uL    Immature Granulocytes Absolute, Automated 0.06 0.00 - 0.50 x10*3/uL    Lymphocytes Absolute 1.28 0.80 - 3.00 x10*3/uL    Monocytes Absolute 0.60 0.05 - 0.80 x10*3/uL    Eosinophils Absolute 0.20 0.00 - 0.40 x10*3/uL    Basophils Absolute 0.04 0.00 - 0.10 x10*3/uL   Coagulation Screen   Result Value Ref Range    Protime 14.9 (H) 9.8 - 12.8 seconds    INR 1.3 (H) 0.9 - 1.1    aPTT 29 27 - 38 seconds   Renal function panel   Result Value Ref Range    Glucose 95 74 - 99 mg/dL    Sodium 138 136 - 145 mmol/L    Potassium 4.1 3.5 - 5.3 mmol/L    Chloride 105 98 - 107 mmol/L    Bicarbonate 24 21 - 32 mmol/L    Anion Gap 13 10 - 20 mmol/L    Urea Nitrogen 18 6 - 23 mg/dL    Creatinine 1.15 0.50 - 1.30 mg/dL    eGFR 67 >60 mL/min/1.73m*2    Calcium 8.2 (L) 8.6 - 10.6 mg/dL    Phosphorus 3.6 2.5 - 4.9 mg/dL    Albumin 3.6 3.4 - 5.0 g/dL   Magnesium   Result Value Ref Range    Magnesium 1.90 1.60 - 2.40 mg/dL   CBC and Auto Differential   Result Value Ref Range    WBC 6.6 4.4 - 11.3 x10*3/uL    nRBC 0.0 0.0 - 0.0 /100 WBCs    RBC 3.69 (L) 4.50 - 5.90 x10*6/uL    Hemoglobin 9.3 (L) 13.5 - 17.5 g/dL    Hematocrit 31.2 (L) 41.0 - 52.0 %    MCV 85 80 - 100 fL    MCH 25.2 (L) 26.0 - 34.0 pg    MCHC 29.8 (L)  32.0 - 36.0 g/dL    RDW 16.1 (H) 11.5 - 14.5 %    Platelets 216 150 - 450 x10*3/uL    Neutrophils % 64.2 40.0 - 80.0 %    Immature Granulocytes %, Automated 0.2 0.0 - 0.9 %    Lymphocytes % 20.9 13.0 - 44.0 %    Monocytes % 11.7 2.0 - 10.0 %    Eosinophils % 2.7 0.0 - 6.0 %    Basophils % 0.3 0.0 - 2.0 %    Neutrophils Absolute 4.21 1.60 - 5.50 x10*3/uL    Immature Granulocytes Absolute, Automated 0.01 0.00 - 0.50 x10*3/uL    Lymphocytes Absolute 1.37 0.80 - 3.00 x10*3/uL    Monocytes Absolute 0.77 0.05 - 0.80 x10*3/uL    Eosinophils Absolute 0.18 0.00 - 0.40 x10*3/uL    Basophils Absolute 0.02 0.00 - 0.10 x10*3/uL      CTPE  IMPRESSION:  1. No evidence of acute pulmonary embolism.  2. Dilated main pulmonary artery along with reflux of intravenous  contrast into upper SVC and central hepatic veins. Correlate with  concern for pulmonary hypertension and elevated right heart pressures.  3. Suggestion of mild left ventricular and left atrial enlargement  and correlate with echocardiogram.  4. Aneurysmal dilatation ascending thoracic aorta measuring up to 4.5  cm. Further evaluation is limited due to near noncontrast  opacification on present pulmonary arterial phase scan. A follow-up  CT/MR angiogram of the chest in 6-12 months is recommended to ensure  stability.  5. Severe coronary artery calcifications, status post CABG. Please  note that, the present study is not tailored for evaluation of  coronary arteries or bypass grafts.  6. Multiple prominent to mildly enlarged mediastinal lymph nodes,  which are nonspecific, but may be reactive in nature. Attention on  follow-up imaging is recommended.  7. Patchy mosaic attenuation within bilateral lungs, which may  represent a component of small vessel/small airway disease.    CT A/P  IMPRESSION:  1. No evidence of acute pathology within the abdomen and pelvis.  2. Please refer to the same-day CTA chest regarding intrathoracic  findings.  3. Incidental findings of atrophy  of the left hepatic lobe, chronic  pancreatitis, and nonobstructing bilateral nephrolithiasis as  described above.    Assessment/Plan   Principal Problem:    Acute on chronic systolic (congestive) heart failure (CMS/HCC)  Active Problems:    Acute on chronic systolic congestive heart failure (CMS/HCC)    Marco Antonio Montalvo is a 73 y.o. male PMH HFrEF (EF 48% in 11/23), CAD s/p PCI in 2001 and CABG x3 (LIMA-LAD, SVG-PDA, SVG-D2) 11/2023, A-fib RVR on xaretlo, cholelithiasis, nephrolithiasis c/b pyelonephritis, PTSD, depression, chronic pain and GERD who presents today with several days of worsening shortness of breath and abdominal pain. Worsening HUNTER, LE edema and orthopnea in setting of elevated BNP and vascular congestion on imaging. Will continue to diurese for suspected acute decompensation heart failure iso medication noncompliance.     Updates:  -s/p 120 mg IV lasix yesterday  -UOP only recorded at 375cc over last 24s, weight 100 kg from 102 on admission  -Cr bump from 0.87 to 1.15 this AM iso diuresis  -given low UOP, will give 80 IV lasix today and re-dose in evening  -TTE ordered, likely to be completed tomorrow  -plan to switch from lisinopril to entresto, so held lisinopril for 36 hours washout period  -psych consulted, impression was PTSD and Group B personality traits, recommended mirtazapine 7.5 mg nightly for sleep and will continue to follow     #Acute on Chronic Systolic HF (EF 48%) s/t medication noncompliance  #CAD s/p CABG (11/23)  :: TTTE 11/23 w/ EF 48% post-CABG  :: dry weight ~190lbs, admission weight 210 lbs.  :: trop negative, BNP elevated at 219 (2000s at OSH)  :: supposed to be on torsemide 20 mg daily at home, metoprolol succinate ER 25 mg daily, lisinopril 5 daily  :: CXR w/ cardiomegaly and vascular congestion  :: CTPE w/ did note dilated main pulmonary artery, mild left ventricular and left atrial enlargement, a multiple prominent to mildly enlarged mediastinal lymph nodes, and patchy  mosaic attenuation within bilateral lungs, which may represent a component of small vessel/small airway disease.  :: PA Systolic pressure 33 in 11/23 on PA cath, though in setting of diuresis  :: PFTs 11/23 with normal FEV1 and FVC but reduced ratio suggestive of mild obstructive pattern or normal variant. Significant bronchodilator response. Normal DLCO.  -continue home atorvastatin 40 mg  -hold home metoprolol succinate given bradycardia  -start entresto, gradually add GDMT as tolerated  -s/p 80 IV lasix, assess diureses needs daily  -goal NN 1.5-2L  -BID rfp, mg  -strict I&O's, daily weights     #A fib w/ bradycardia  :: home meds- metoprolol succinate, xarelto 20 mg daily. Has not been taking.  -hold metoprolol, continue xarelto  -CTM, if concern for sxs bradycardia may need to discuss PPM    #Depression vs Bipolar 1  #PTSD  #Social  :: patient teary and perseverating on time in   -not taking zoloft 100 mg daily  -psych consulted,  impression was PTSD and Group B personality traits, recommended mirtazapine 7.5 mg nightly for sleep and will continue to follow  -would benefit from home health care     #Abdominal Pain  :: CT A/P No evidence of acute pathology within the abdomen and pelvis. Incidental findings of atrophy of the left hepatic lobe, chronic  pancreatitis, and nonobstructing bilateral nephrolithiasis as described above  :: UA bland, LFTs wnl  -ctm, suspect may be s/t constipation as patient states he not regular. Low suspicion for acute pancreatitis flare. Could also be component acid reflux  vs PUD as improves with PPI  -continue PPI  -miralax daily  -needs outpatient endoscopy     #Thoracic Aortic Aneurysm  :: CT PE w/ aneurysmal dilatation ascending thoracic aorta measuring up to 4.5 cm  -follow-up CT/MR angiogram of the chest in 6-12 months is recommended to ensure  stability.     #SHON  :: hgb 9.3, hgb 8-9 since 11/17/23 w/ acute drop from ~14. At that time attributed to blood loss from  procedure. No reported melena or BRBPR.   :: Iron 27, UIBC 394, TIBC 421, % sat 6, ferritin 6  -start iron supplement  -daily cbc     #Chronic pain  -continue methadone 20 mg q12     #tobacco use disorder  -nicotine patches     F: diuresing  E: K>4, Mg>2  N: Na restricted  A: PIV  GI ppx: home PPI  DVT ppx: home xarelto  NOK: Daughter Kelsey [phone]  Code Status: Full code     Dispo: admit to medicine    Brenna Stanton MD

## 2024-02-25 NOTE — DISCHARGE INSTRUCTIONS
Dear. Mr Montalvo,     You were admitted to the hospital for worsening shortness of breath and swelling in your legs. You had imaging of your chest that showed fluid on your lungs. You were given water pills to help remove fluid with improvement in your symptoms. You were also seen by psychiatry for PTSD and were started on a medication called Quetiapine but it made you too sleepy so we stopped it.  You should follow-up outpatient with cardiology, psychiatry and your primary care provider.     Wishing you a speedy recovery,     [facility]iology Team    Medication Changes:  You were started on lasix 40 mg once a day to help with the removal of fluid from your body.  [facility]    Follow-Up Appointments:  Cardiology  PCP  Psychiatry   Urology

## 2024-02-25 NOTE — CARE PLAN
Problem: Heart Failure  Goal: Improved gas exchange this shift  Outcome: Progressing  Goal: Improved urinary output this shift  Outcome: Progressing  Goal: Reduction in peripheral edema within 24 hours  Outcome: Progressing  Goal: Report improvement of dyspnea/breathlessness this shift  Outcome: Progressing  Goal: Weight from fluid excess reduced over 2-3 days, then stabilize  Outcome: Progressing  Goal: Increase self care and/or family involvement in 24 hours  Outcome: Progressing     Problem: Pain  Goal: Takes deep breaths with improved pain control throughout the shift  Outcome: Progressing  Goal: Turns in bed with improved pain control throughout the shift  Outcome: Progressing  Goal: Walks with improved pain control throughout the shift  Outcome: Progressing  Goal: Performs ADL's with improved pain control throughout shift  Outcome: Progressing  Goal: Participates in PT with improved pain control throughout the shift  Outcome: Progressing  Goal: Free from opioid side effects throughout the shift  Outcome: Progressing  Goal: Free from acute confusion related to pain meds throughout the shift  Outcome: Progressing     Problem: Pain - Adult  Goal: Verbalizes/displays adequate comfort level or baseline comfort level  Outcome: Progressing     Problem: Safety - Adult  Goal: Free from fall injury  Outcome: Progressing     Problem: Discharge Planning  Goal: Discharge to home or other facility with appropriate resources  Outcome: Progressing

## 2024-02-26 ENCOUNTER — APPOINTMENT (OUTPATIENT)
Dept: CARDIOLOGY | Facility: HOSPITAL | Age: 74
DRG: 292 | End: 2024-02-26
Payer: MEDICARE

## 2024-02-26 LAB
ALBUMIN SERPL BCP-MCNC: 3.7 G/DL (ref 3.4–5)
ANION GAP SERPL CALC-SCNC: 12 MMOL/L (ref 10–20)
AORTIC VALVE MEAN GRADIENT: 4.7 MMHG
AORTIC VALVE PEAK VELOCITY: 1.44 M/S
ATRIAL RATE: 234 BPM
ATRIAL RATE: 65 BPM
ATRIAL RATE: 68 BPM
AV PEAK GRADIENT: 8.3 MMHG
AVA (PEAK VEL): 4.06 CM2
AVA (VTI): 3.77 CM2
BASOPHILS # BLD AUTO: 0.03 X10*3/UL (ref 0–0.1)
BASOPHILS NFR BLD AUTO: 0.5 %
BUN SERPL-MCNC: 15 MG/DL (ref 6–23)
CALCIUM SERPL-MCNC: 8.5 MG/DL (ref 8.6–10.6)
CHLORIDE SERPL-SCNC: 106 MMOL/L (ref 98–107)
CO2 SERPL-SCNC: 25 MMOL/L (ref 21–32)
CREAT SERPL-MCNC: 0.94 MG/DL (ref 0.5–1.3)
EGFRCR SERPLBLD CKD-EPI 2021: 86 ML/MIN/1.73M*2
EOSINOPHIL # BLD AUTO: 0.15 X10*3/UL (ref 0–0.4)
EOSINOPHIL NFR BLD AUTO: 2.5 %
ERYTHROCYTE [DISTWIDTH] IN BLOOD BY AUTOMATED COUNT: 16.2 % (ref 11.5–14.5)
GLUCOSE SERPL-MCNC: 99 MG/DL (ref 74–99)
HCT VFR BLD AUTO: 32.6 % (ref 41–52)
HGB BLD-MCNC: 10 G/DL (ref 13.5–17.5)
IMM GRANULOCYTES # BLD AUTO: 0.03 X10*3/UL (ref 0–0.5)
IMM GRANULOCYTES NFR BLD AUTO: 0.5 % (ref 0–0.9)
LEFT ATRIUM VOLUME AREA LENGTH INDEX BSA: 52.2 ML/M2
LEFT VENTRICLE INTERNAL DIMENSION DIASTOLE: 6.26 CM (ref 3.5–6)
LEFT VENTRICULAR OUTFLOW TRACT DIAMETER: 2.47 CM
LYMPHOCYTES # BLD AUTO: 1.64 X10*3/UL (ref 0.8–3)
LYMPHOCYTES NFR BLD AUTO: 26.8 %
MAGNESIUM SERPL-MCNC: 2.41 MG/DL (ref 1.6–2.4)
MCH RBC QN AUTO: 25.4 PG (ref 26–34)
MCHC RBC AUTO-ENTMCNC: 30.7 G/DL (ref 32–36)
MCV RBC AUTO: 83 FL (ref 80–100)
MITRAL VALVE E/A RATIO: 4.01
MITRAL VALVE E/E' RATIO: 6.37
MONOCYTES # BLD AUTO: 0.67 X10*3/UL (ref 0.05–0.8)
MONOCYTES NFR BLD AUTO: 10.9 %
NEUTROPHILS # BLD AUTO: 3.6 X10*3/UL (ref 1.6–5.5)
NEUTROPHILS NFR BLD AUTO: 58.8 %
NRBC BLD-RTO: 0.3 /100 WBCS (ref 0–0)
PHOSPHATE SERPL-MCNC: 3.2 MG/DL (ref 2.5–4.9)
PLATELET # BLD AUTO: 224 X10*3/UL (ref 150–450)
POTASSIUM SERPL-SCNC: 4.2 MMOL/L (ref 3.5–5.3)
Q ONSET: 209 MS
Q ONSET: 209 MS
Q ONSET: 212 MS
QRS COUNT: 10 BEATS
QRS COUNT: 8 BEATS
QRS COUNT: 8 BEATS
QRS DURATION: 106 MS
QRS DURATION: 114 MS
QRS DURATION: 118 MS
QT INTERVAL: 442 MS
QT INTERVAL: 464 MS
QT INTERVAL: 470 MS
QTC CALCULATION(BAZETT): 419 MS
QTC CALCULATION(BAZETT): 437 MS
QTC CALCULATION(BAZETT): 472 MS
QTC FREDERICIA: 426 MS
QTC FREDERICIA: 448 MS
QTC FREDERICIA: 469 MS
R AXIS: -25 DEGREES
R AXIS: -59 DEGREES
R AXIS: 176 DEGREES
RBC # BLD AUTO: 3.94 X10*6/UL (ref 4.5–5.9)
RIGHT VENTRICLE FREE WALL PEAK S': 6 CM/S
RIGHT VENTRICLE PEAK SYSTOLIC PRESSURE: 21.5 MMHG
SODIUM SERPL-SCNC: 139 MMOL/L (ref 136–145)
T AXIS: 120 DEGREES
T AXIS: 43 DEGREES
T AXIS: 93 DEGREES
T OFFSET: 430 MS
T OFFSET: 447 MS
T OFFSET: 468 MS
TRICUSPID ANNULAR PLANE SYSTOLIC EXCURSION: 1.3 CM
VENTRICULAR RATE: 52 BPM
VENTRICULAR RATE: 54 BPM
VENTRICULAR RATE: 62 BPM
WBC # BLD AUTO: 6.1 X10*3/UL (ref 4.4–11.3)

## 2024-02-26 PROCEDURE — 2500000001 HC RX 250 WO HCPCS SELF ADMINISTERED DRUGS (ALT 637 FOR MEDICARE OP)

## 2024-02-26 PROCEDURE — 85025 COMPLETE CBC W/AUTO DIFF WBC: CPT

## 2024-02-26 PROCEDURE — 2500000002 HC RX 250 W HCPCS SELF ADMINISTERED DRUGS (ALT 637 FOR MEDICARE OP, ALT 636 FOR OP/ED)

## 2024-02-26 PROCEDURE — 36415 COLL VENOUS BLD VENIPUNCTURE: CPT

## 2024-02-26 PROCEDURE — 2500000002 HC RX 250 W HCPCS SELF ADMINISTERED DRUGS (ALT 637 FOR MEDICARE OP, ALT 636 FOR OP/ED): Mod: MUE

## 2024-02-26 PROCEDURE — 83735 ASSAY OF MAGNESIUM: CPT

## 2024-02-26 PROCEDURE — 2500000004 HC RX 250 GENERAL PHARMACY W/ HCPCS (ALT 636 FOR OP/ED)

## 2024-02-26 PROCEDURE — 1200000002 HC GENERAL ROOM WITH TELEMETRY DAILY

## 2024-02-26 PROCEDURE — 93306 TTE W/DOPPLER COMPLETE: CPT

## 2024-02-26 PROCEDURE — S0109 METHADONE ORAL 5MG: HCPCS

## 2024-02-26 PROCEDURE — 93306 TTE W/DOPPLER COMPLETE: CPT | Performed by: INTERNAL MEDICINE

## 2024-02-26 PROCEDURE — 99233 SBSQ HOSP IP/OBS HIGH 50: CPT

## 2024-02-26 PROCEDURE — 80069 RENAL FUNCTION PANEL: CPT

## 2024-02-26 RX ORDER — MAGNESIUM SULFATE HEPTAHYDRATE 40 MG/ML
2 INJECTION, SOLUTION INTRAVENOUS ONCE
Status: COMPLETED | OUTPATIENT
Start: 2024-02-26 | End: 2024-02-26

## 2024-02-26 RX ORDER — FUROSEMIDE 10 MG/ML
80 INJECTION INTRAMUSCULAR; INTRAVENOUS ONCE
Status: COMPLETED | OUTPATIENT
Start: 2024-02-26 | End: 2024-02-26

## 2024-02-26 RX ORDER — POTASSIUM CHLORIDE 20 MEQ/1
40 TABLET, EXTENDED RELEASE ORAL ONCE
Status: COMPLETED | OUTPATIENT
Start: 2024-02-26 | End: 2024-02-26

## 2024-02-26 RX ORDER — SPIRONOLACTONE 25 MG/1
25 TABLET ORAL DAILY
Status: DISCONTINUED | OUTPATIENT
Start: 2024-02-26 | End: 2024-03-04 | Stop reason: HOSPADM

## 2024-02-26 RX ADMIN — METHADONE HYDROCHLORIDE 20 MG: 10 TABLET ORAL at 06:45

## 2024-02-26 RX ADMIN — FUROSEMIDE 80 MG: 10 INJECTION, SOLUTION INTRAMUSCULAR; INTRAVENOUS at 13:09

## 2024-02-26 RX ADMIN — PERFLUTREN 2 ML OF DILUTION: 6.52 INJECTION, SUSPENSION INTRAVENOUS at 10:00

## 2024-02-26 RX ADMIN — QUETIAPINE FUMARATE 12.5 MG: 25 TABLET ORAL at 08:58

## 2024-02-26 RX ADMIN — RIVAROXABAN 20 MG: 20 TABLET, FILM COATED ORAL at 18:13

## 2024-02-26 RX ADMIN — OXYCODONE HYDROCHLORIDE AND ACETAMINOPHEN 1000 MG: 500 TABLET ORAL at 08:58

## 2024-02-26 RX ADMIN — ATORVASTATIN CALCIUM 40 MG: 40 TABLET, FILM COATED ORAL at 20:50

## 2024-02-26 RX ADMIN — PANTOPRAZOLE SODIUM 20 MG: 20 TABLET, DELAYED RELEASE ORAL at 07:00

## 2024-02-26 RX ADMIN — MIRTAZAPINE 7.5 MG: 7.5 TABLET, FILM COATED ORAL at 20:50

## 2024-02-26 RX ADMIN — FUROSEMIDE 80 MG: 10 INJECTION, SOLUTION INTRAMUSCULAR; INTRAVENOUS at 20:50

## 2024-02-26 RX ADMIN — METHADONE HYDROCHLORIDE 20 MG: 10 TABLET ORAL at 18:13

## 2024-02-26 RX ADMIN — SACUBITRIL AND VALSARTAN 1 TABLET: 24; 26 TABLET, FILM COATED ORAL at 20:50

## 2024-02-26 RX ADMIN — TAMSULOSIN HYDROCHLORIDE 0.4 MG: 0.4 CAPSULE ORAL at 08:58

## 2024-02-26 RX ADMIN — SACUBITRIL AND VALSARTAN 1 TABLET: 24; 26 TABLET, FILM COATED ORAL at 08:58

## 2024-02-26 RX ADMIN — SPIRONOLACTONE 25 MG: 25 TABLET, FILM COATED ORAL at 13:09

## 2024-02-26 RX ADMIN — ASPIRIN 81 MG: 81 TABLET, COATED ORAL at 08:58

## 2024-02-26 RX ADMIN — FERROUS SULFATE TAB 325 MG (65 MG ELEMENTAL FE) 1 TABLET: 325 (65 FE) TAB at 08:58

## 2024-02-26 RX ADMIN — MAGNESIUM SULFATE HEPTAHYDRATE 2 G: 40 INJECTION, SOLUTION INTRAVENOUS at 03:14

## 2024-02-26 RX ADMIN — QUETIAPINE FUMARATE 12.5 MG: 25 TABLET ORAL at 21:56

## 2024-02-26 RX ADMIN — POTASSIUM CHLORIDE 40 MEQ: 1500 TABLET, EXTENDED RELEASE ORAL at 02:16

## 2024-02-26 ASSESSMENT — COGNITIVE AND FUNCTIONAL STATUS - GENERAL
MOBILITY SCORE: 23
DAILY ACTIVITIY SCORE: 24
CLIMB 3 TO 5 STEPS WITH RAILING: A LITTLE
MOBILITY SCORE: 23
CLIMB 3 TO 5 STEPS WITH RAILING: A LITTLE
DAILY ACTIVITIY SCORE: 24

## 2024-02-26 ASSESSMENT — PAIN SCALES - GENERAL
PAINLEVEL_OUTOF10: 0 - NO PAIN
PAINLEVEL_OUTOF10: 0 - NO PAIN

## 2024-02-26 NOTE — CARE PLAN
The patient's goals for the shift include      The clinical goals for the shift include Pt will remain HDS throughout shift      Problem: Heart Failure  Goal: Improved gas exchange this shift  2/26/2024 1832 by Isha De La Rosa RN  Outcome: Progressing  2/26/2024 1831 by Isha De La Rosa RN  Outcome: Progressing  Goal: Improved urinary output this shift  2/26/2024 1832 by Isha De La Rosa, RN  Outcome: Progressing  2/26/2024 1831 by Isha De La Rosa, RN  Outcome: Progressing  Goal: Reduction in peripheral edema within 24 hours  2/26/2024 1832 by Isha De La Rosa, RN  Outcome: Progressing  2/26/2024 1831 by Isha De La Rosa RN  Outcome: Progressing  Goal: Report improvement of dyspnea/breathlessness this shift  2/26/2024 1832 by Isha De La Rosa, RN  Outcome: Progressing  2/26/2024 1831 by Isha De La Rosa RN  Outcome: Progressing  Goal: Weight from fluid excess reduced over 2-3 days, then stabilize  2/26/2024 1832 by Isha De La Rosa, RN  Outcome: Progressing  2/26/2024 1831 by Isha De La Rosa RN  Outcome: Progressing  Goal: Increase self care and/or family involvement in 24 hours  2/26/2024 1832 by Isha De La Rosa, RN  Outcome: Progressing  2/26/2024 1831 by Isha De La Rosa RN  Outcome: Progressing     Problem: Pain  Goal: Takes deep breaths with improved pain control throughout the shift  2/26/2024 1832 by Isha De La Rosa, RN  Outcome: Progressing  2/26/2024 1831 by Isha De La Rosa RN  Outcome: Progressing  Goal: Turns in bed with improved pain control throughout the shift  2/26/2024 1832 by Isha De La Rosa, RN  Outcome: Progressing  2/26/2024 1831 by Isha De La Rosa RN  Outcome: Progressing  Goal: Walks with improved pain control throughout the shift  2/26/2024 1832 by Isha De La Rosa, RN  Outcome: Progressing  2/26/2024 1831 by Isha De La Rosa RN  Outcome: Progressing  Goal: Performs ADL's with improved pain control throughout shift  2/26/2024 1832 by Isha De La Rosa, RN  Outcome: Progressing  2/26/2024 1831 by  Isha De La Rosa RN  Outcome: Progressing  Goal: Participates in PT with improved pain control throughout the shift  2/26/2024 1832 by Isha De La Rosa RN  Outcome: Progressing  2/26/2024 1831 by Isha De La Rosa RN  Outcome: Progressing  Goal: Free from opioid side effects throughout the shift  2/26/2024 1832 by Isha De La Rosa RN  Outcome: Progressing  2/26/2024 1831 by Isha De La Rosa RN  Outcome: Progressing  Goal: Free from acute confusion related to pain meds throughout the shift  2/26/2024 1832 by Isha De La Rosa RN  Outcome: Progressing  2/26/2024 1831 by Isha De La Rosa RN  Outcome: Progressing     Problem: Pain - Adult  Goal: Verbalizes/displays adequate comfort level or baseline comfort level  2/26/2024 1832 by Isha De La Rosa RN  Outcome: Progressing  2/26/2024 1831 by Isha De La Rosa RN  Outcome: Progressing     Problem: Safety - Adult  Goal: Free from fall injury  2/26/2024 1832 by Isha De La Rosa RN  Outcome: Progressing  2/26/2024 1831 by Isha De La Rosa RN  Outcome: Progressing     Problem: Discharge Planning  Goal: Discharge to home or other facility with appropriate resources  2/26/2024 1832 by Isha De La Rosa RN  Outcome: Progressing  2/26/2024 1831 by Isha De La Rosa RN  Outcome: Progressing     Problem: Chronic Conditions and Co-morbidities  Goal: Patient's chronic conditions and co-morbidity symptoms are monitored and maintained or improved  2/26/2024 1832 by Isha De La Rosa RN  Outcome: Progressing  2/26/2024 1831 by Isha De La Rosa RN  Outcome: Progressing

## 2024-02-26 NOTE — CONSULTS
"Nutrition Initial Assessment:   Nutrition Assessment    Reason for Assessment: Admission nursing screening    Patient is a 73 y.o. male on day 2 of admission presenting with Acute on chronic systolic (congestive) heart failure      PMHx  HFrEF, CAD s/p PCI in 2001 and CABG x3 -11/2023, A-fib RVR on xaretlo, cholelithiasis, nephrolithiasis c/b pyelonephritis, PTSD, depression, chronic pain and GERD      Nutrition History:  Food and Nutrient History: Pt reports fairly good appetite and intake but doesn't like the food here because it isn't like he eats at home.  He likes to cook things like ham and beans and beef and noodles. Asking for a protein drink. States he drinks Premier Protein at home.  Food Allergies/Intolerances:  None  GI Symptoms: None  Oral Problems: None       Anthropometrics:  Height: 177.8 cm (5' 10\")   Weight: 99.6 kg (219 lb 11 oz)   BMI (Calculated): 31.52  IBW/kg (Dietitian Calculated): 75.5 kg          Weight History:   3/5/22 - 95.7kg  8/13/23 - 98kg  11/25/23 - 94.9kg  12/7/23 93.4kg    Weight         2/23/2024  2326 2/24/2024  0600 2/25/2024  0548 2/26/2024  0312 2/26/2024  0600    Weight: 102 kg (224 lb 6.9 oz) 101 kg (223 lb 8.7 oz) 100 kg (220 lb 10.9 oz) 99.6 kg (219 lb 11 oz) 99.6 kg (219 lb 11 oz)          Pt states that he used to be thin about 2 years ago but gained weight when he became less active because of knee pain. Stated that he was 195# about a month ago. No weight loss verified based on preadmission weights and admission weights.  Weight loss may be masked by fluid.     Nutrition Focused Physical Exam Findings:    Subcutaneous Fat Loss:   Orbital Fat Pads: Well nourshed (slightly bulging fat pads)  Buccal Fat Pads: Well nourished (full, rounded cheeks)  Triceps: Well nourished (ample fat tissue)  Muscle Wasting:  Temporalis: Well nourished (well-defined muscle)  Pectoralis (Clavicular Region): Well nourished (clavicle not visible)  Deltoid/Trapezius: Well nourished (rounded " appearance at arm, shoulder, neck)  Interosseous: Well nourished (muscle bulges)  Quadriceps:  (edema)  Gastrocnemius:  (edema)  Edema:  Edema: +2 mild (BLE)    Nutrition Significant Labs:  CBC Trend:   Results from last 7 days   Lab Units 02/26/24  0904 02/25/24  0654 02/24/24  0950 02/23/24  1412   WBC AUTO x10*3/uL 6.1 6.6 6.6 6.2   RBC AUTO x10*6/uL 3.94* 3.69* 3.80* 3.63*   HEMOGLOBIN g/dL 10.0* 9.3* 9.9* 9.3*   HEMATOCRIT % 32.6* 31.2* 33.6* 29.0*   MCV fL 83 85 88 80   PLATELETS AUTO x10*3/uL 224 216 224 206    , BMP Trend:   Results from last 7 days   Lab Units 02/26/24  0904 02/25/24  2246 02/25/24  0654 02/24/24  0950   GLUCOSE mg/dL 99 102* 95 107*   CALCIUM mg/dL 8.5* 7.2* 8.2* 7.1*   SODIUM mmol/L 139 140 138 142   POTASSIUM mmol/L 4.2 3.4* 4.1 3.4*   CO2 mmol/L 25 23 24 23   CHLORIDE mmol/L 106 107 105 111*   BUN mg/dL 15 16 18 14   CREATININE mg/dL 0.94 0.86 1.15 0.87    , A1C:  Lab Results   Component Value Date    HGBA1C 5.8 (H) 11/02/2023       Nutrition Specific Medications:  Vit C, ferrous sulfate, remeron, lasix      Dietary Orders (From admission, onward)       Start     Ordered    02/25/24 1421  Adult diet Regular  Diet effective now        Question:  Diet type  Answer:  Regular    02/25/24 1420                     Estimated Needs:   Total Energy Estimated Needs (kCal): 2000 kCal  Method for Estimating Needs: 20kcal/kg ABW  Total Protein Estimated Needs (g): 110 g  Method for Estimating Needs: 1.5gm/kg IBW  Total Fluid Estimated Needs (mL):  (per team)       Nutrition Diagnosis   Malnutrition Diagnosis  Patient has Malnutrition Diagnosis: No    Nutrition Diagnosis  Patient has Nutrition Diagnosis: Yes  Diagnosis Status (1): New  Nutrition Diagnosis 1: Excessive mineral intake (sodium)  Related to (1): intake of high sodium foods  As Evidenced by (1): pt recall       Nutrition Interventions/Recommendations         Nutrition Prescription:  Individualized Nutrition Prescription Provided for :  Consider changing diet back to 2-3gm sodium.   RDN to add Ensure High Protein BID per pt request. This will provide 160 calories and 16 gm protein.   Daily weights and strict I/O        Nutrition Interventions:   Interventions: Enteral intake, Medical food supplement  Additional Interventions: pt to consume >75% of meals and less than 3000mg Na per day.      Nutrition Education:   Pt would benefit from review of low sodium diet for home. He was not receptive to this information during RDN visit today.         Nutrition Monitoring and Evaluation   Food/Nutrient Related History Monitoring  Monitoring and Evaluation Plan: Energy intake, Amount of food  Criteria: intake adequate to meet needs and controlled in Na    Body Composition/Growth/Weight History  Monitoring and Evaluation Plan: Weight  Criteria: Gradual weight loss from edema    Biochemical Data, Medical Tests and Procedures  Monitoring and Evaluation Plan: Electrolyte/renal panel, Glucose/endocrine profile  Criteria: Labs WNL    Time Spent/Follow-up Reminder:   Time Spent (min): 60 minutes  Last Date of Nutrition Visit: 02/26/24  Nutrition Follow-Up Needed?: Dietitian to reassess per policy

## 2024-02-26 NOTE — CARE PLAN
Problem: Heart Failure  Goal: Improved gas exchange this shift  Outcome: Progressing  Goal: Improved urinary output this shift  Outcome: Progressing  Goal: Reduction in peripheral edema within 24 hours  Outcome: Progressing  Goal: Report improvement of dyspnea/breathlessness this shift  Outcome: Progressing  Goal: Weight from fluid excess reduced over 2-3 days, then stabilize  Outcome: Progressing  Goal: Increase self care and/or family involvement in 24 hours  Outcome: Progressing     Problem: Pain  Goal: Takes deep breaths with improved pain control throughout the shift  Outcome: Progressing  Goal: Turns in bed with improved pain control throughout the shift  Outcome: Progressing  Goal: Walks with improved pain control throughout the shift  Outcome: Progressing  Goal: Performs ADL's with improved pain control throughout shift  Outcome: Progressing  Goal: Participates in PT with improved pain control throughout the shift  Outcome: Progressing  Goal: Free from opioid side effects throughout the shift  Outcome: Progressing  Goal: Free from acute confusion related to pain meds throughout the shift  Outcome: Progressing     Problem: Pain - Adult  Goal: Verbalizes/displays adequate comfort level or baseline comfort level  Outcome: Progressing     Problem: Safety - Adult  Goal: Free from fall injury  Outcome: Progressing     Problem: Discharge Planning  Goal: Discharge to home or other facility with appropriate resources  Outcome: Progressing     Problem: Chronic Conditions and Co-morbidities  Goal: Patient's chronic conditions and co-morbidity symptoms are monitored and maintained or improved  Outcome: Progressing

## 2024-02-26 NOTE — NURSING NOTE
"Pt stated he has SOB and cramping in feet and hands that last up to 15 min at times. Pt stated the cramping and SOB has been occurring for about one week. Doctor was notified of pt's complaints and came to bedside to assess pt. Pt told doctor SOB had been taking place since this morning. Pt stated it comes and goes. Pt was compliant with medications and interventions throughout the day. Pt stated \"my legs are filled because my kidneys are blocked, it is not my heart issue.\" Pt stated he plans to speak with the team Monday morning regarding the kidney issues during rounds. Pt verbally agreed and confirmed to let the night nurse know if he has any new symptoms or worsening SOB.   "

## 2024-02-26 NOTE — PROGRESS NOTES
"Marco Antonio Montalvo is a 73 y.o. male on day 3 of admission presenting with Acute on chronic systolic (congestive) heart failure (CMS/HCC).    Subjective   He feels like his legs are still very swollen and going the wrong in terms of his fluid status. He is still on RA but is having a more difficult time breathing. He says it is difficult for him to take a deep breath with his bowels distended. He has had multiple bouts of diarrhea after eating since being here    Objective     Physical Exam  Physical Exam  Constitutional: NAD, feet elevated on pillows  HEENT: EOMI, no scleral icterus, MMM  CV: regular, no m/r/g  Pulm: mild shortness of breath while speaking, diminished in bilateral bases  GI: Soft, nontender, non-distended  Extremities: significant edema with compression stockings in place  Skin: warm  Neuro: grossly alert and oriented  Psych: becomes agitated when discussing heart failure diagnosis, perseverating on previous kidney stones and believes they are the cause of his problems    Last Recorded Vitals  Blood pressure 128/63, pulse 62, temperature 36.3 °C (97.3 °F), resp. rate 17, height 1.778 m (5' 10\"), weight 99.6 kg (219 lb 11 oz), SpO2 96 %.  Intake/Output last 3 Shifts:  I/O last 3 completed shifts:  In: 540 (5.4 mL/kg) [P.O.:540]  Out: 3376 (33.9 mL/kg) [Urine:3375 (0.9 mL/kg/hr); Stool:1]  Weight: 99.6 kg     Relevant Results  Results for orders placed or performed during the hospital encounter of 02/23/24 (from the past 24 hour(s))   ECG 12 lead   Result Value Ref Range    Ventricular Rate 52 BPM    Atrial Rate 234 BPM    QRS Duration 118 ms    QT Interval 470 ms    QTC Calculation(Bazett) 437 ms    R Axis -25 degrees    T Axis 93 degrees    QRS Count 8 beats    Q Onset 212 ms    T Offset 447 ms    QTC Fredericia 448 ms   Renal function panel   Result Value Ref Range    Glucose 102 (H) 74 - 99 mg/dL    Sodium 140 136 - 145 mmol/L    Potassium 3.4 (L) 3.5 - 5.3 mmol/L    Chloride 107 98 - 107 mmol/L    " Bicarbonate 23 21 - 32 mmol/L    Anion Gap 13 10 - 20 mmol/L    Urea Nitrogen 16 6 - 23 mg/dL    Creatinine 0.86 0.50 - 1.30 mg/dL    eGFR >90 >60 mL/min/1.73m*2    Calcium 7.2 (L) 8.6 - 10.6 mg/dL    Phosphorus 3.4 2.5 - 4.9 mg/dL    Albumin 3.0 (L) 3.4 - 5.0 g/dL   Magnesium   Result Value Ref Range    Magnesium 1.64 1.60 - 2.40 mg/dL   Renal function panel   Result Value Ref Range    Glucose 99 74 - 99 mg/dL    Sodium 139 136 - 145 mmol/L    Potassium 4.2 3.5 - 5.3 mmol/L    Chloride 106 98 - 107 mmol/L    Bicarbonate 25 21 - 32 mmol/L    Anion Gap 12 10 - 20 mmol/L    Urea Nitrogen 15 6 - 23 mg/dL    Creatinine 0.94 0.50 - 1.30 mg/dL    eGFR 86 >60 mL/min/1.73m*2    Calcium 8.5 (L) 8.6 - 10.6 mg/dL    Phosphorus 3.2 2.5 - 4.9 mg/dL    Albumin 3.7 3.4 - 5.0 g/dL   Magnesium   Result Value Ref Range    Magnesium 2.41 (H) 1.60 - 2.40 mg/dL   CBC and Auto Differential   Result Value Ref Range    WBC 6.1 4.4 - 11.3 x10*3/uL    nRBC 0.3 (H) 0.0 - 0.0 /100 WBCs    RBC 3.94 (L) 4.50 - 5.90 x10*6/uL    Hemoglobin 10.0 (L) 13.5 - 17.5 g/dL    Hematocrit 32.6 (L) 41.0 - 52.0 %    MCV 83 80 - 100 fL    MCH 25.4 (L) 26.0 - 34.0 pg    MCHC 30.7 (L) 32.0 - 36.0 g/dL    RDW 16.2 (H) 11.5 - 14.5 %    Platelets 224 150 - 450 x10*3/uL    Neutrophils % 58.8 40.0 - 80.0 %    Immature Granulocytes %, Automated 0.5 0.0 - 0.9 %    Lymphocytes % 26.8 13.0 - 44.0 %    Monocytes % 10.9 2.0 - 10.0 %    Eosinophils % 2.5 0.0 - 6.0 %    Basophils % 0.5 0.0 - 2.0 %    Neutrophils Absolute 3.60 1.60 - 5.50 x10*3/uL    Immature Granulocytes Absolute, Automated 0.03 0.00 - 0.50 x10*3/uL    Lymphocytes Absolute 1.64 0.80 - 3.00 x10*3/uL    Monocytes Absolute 0.67 0.05 - 0.80 x10*3/uL    Eosinophils Absolute 0.15 0.00 - 0.40 x10*3/uL    Basophils Absolute 0.03 0.00 - 0.10 x10*3/uL   Transthoracic Echo (TTE) Complete   Result Value Ref Range    BSA 2.22 m2      CTPE  IMPRESSION:  1. No evidence of acute pulmonary embolism.  2. Dilated main  pulmonary artery along with reflux of intravenous  contrast into upper SVC and central hepatic veins. Correlate with  concern for pulmonary hypertension and elevated right heart pressures.  3. Suggestion of mild left ventricular and left atrial enlargement  and correlate with echocardiogram.  4. Aneurysmal dilatation ascending thoracic aorta measuring up to 4.5  cm. Further evaluation is limited due to near noncontrast  opacification on present pulmonary arterial phase scan. A follow-up  CT/MR angiogram of the chest in 6-12 months is recommended to ensure  stability.  5. Severe coronary artery calcifications, status post CABG. Please  note that, the present study is not tailored for evaluation of  coronary arteries or bypass grafts.  6. Multiple prominent to mildly enlarged mediastinal lymph nodes,  which are nonspecific, but may be reactive in nature. Attention on  follow-up imaging is recommended.  7. Patchy mosaic attenuation within bilateral lungs, which may  represent a component of small vessel/small airway disease.    CT A/P  IMPRESSION:  1. No evidence of acute pathology within the abdomen and pelvis.  2. Please refer to the same-day CTA chest regarding intrathoracic  findings.  3. Incidental findings of atrophy of the left hepatic lobe, chronic  pancreatitis, and nonobstructing bilateral nephrolithiasis as  described above.    Assessment/Plan   Principal Problem:    Acute on chronic systolic (congestive) heart failure (CMS/HCC)  Active Problems:    Acute on chronic systolic congestive heart failure (CMS/HCC)    Marco Antonio Montalvo is a 73 y.o. male PMH HFrEF (EF 48% in 11/23), CAD s/p PCI in 2001 and CABG x3 (LIMA-LAD, SVG-PDA, SVG-D2) 11/2023, A-fib RVR on xaretlo, cholelithiasis, nephrolithiasis c/b pyelonephritis, PTSD, depression, chronic pain and GERD who presents today with several days of worsening shortness of breath and abdominal pain. Worsening HUNTER, LE edema and orthopnea in setting of elevated BNP  and vascular congestion on imaging. Will continue to diurese for suspected acute decompensation heart failure iso medication noncompliance.     Updates:  -80 mg IV Lasix BID today  -Net: -2.4 L last 24 hours, wt today: 219 lb (dry 190 lb)  -TTE pending  - Resuming metoprolol succinate 25 mg, starting spironolactone 25 mg; plan on starting jardiance tomorrow     #Acute on Chronic Systolic HF (EF 48%, 11/23) s/t medication noncompliance  #CAD s/p CABG (11/23)  :: TTE 11/23 w/ EF 48% post-CABG  :: dry weight ~190lbs, admission weight 210 lbs.  :: trop negative, BNP elevated at 219 (2000s at OSH)  :: supposed to be on torsemide 20 mg daily at home, metoprolol succinate ER 25 mg daily, lisinopril 5 daily  :: CXR w/ cardiomegaly and vascular congestion  :: CTPE w/ did note dilated main pulmonary artery, mild left ventricular and left atrial enlargement, a multiple prominent to mildly enlarged mediastinal lymph nodes, and patchy mosaic attenuation within bilateral lungs, which may represent a component of small vessel/small airway disease.  :: PA Systolic pressure 33 in 11/23 on PA cath, though in setting of diuresis  :: PFTs 11/23 with normal FEV1 and FVC but reduced ratio suggestive of mild obstructive pattern or normal variant. Significant bronchodilator response. Normal DLCO.  Plan  -Preload: lasix 80 mg BID  -Afterload: entresto 24-26 BID  -NMB: spironolactone 25 mg, metoprolol succinate 25 mg  -SGLT2i: plan to start tomorrow  -continue home atorvastatin 40 mg  -strict I&O's, daily weights     #A fib w/ bradycardia  :: home meds- metoprolol succinate, xarelto 20 mg daily. Has not been taking.  - restarted metoprolol, continue xarelto  -CTM, if concern for sxs bradycardia may need to discuss PPM    #Depression vs Bipolar 1  #PTSD  #Social  :: patient teary and perseverating on time in   -not taking zoloft 100 mg daily  -psych consulted,  impression was PTSD and Group B personality traits, recommended  mirtazapine 7.5 mg nightly for sleep and will continue to follow  -would benefit from home health care     #Abdominal Pain  #Diarrhea  :: CT A/P No evidence of acute pathology within the abdomen and pelvis. Incidental findings of atrophy of the left hepatic lobe, chronic  pancreatitis, and nonobstructing bilateral nephrolithiasis as described above  :: UA bland, LFTs wnl  -ctm, suspect may be s/t constipation as patient states he not regular. Low suspicion for acute pancreatitis flare. Could also be component acid reflux  vs PUD as improves with PPI  -continue PPI  -needs outpatient endoscopy     #Thoracic Aortic Aneurysm  :: CT PE w/ aneurysmal dilatation ascending thoracic aorta measuring up to 4.5 cm  -follow-up CT/MR angiogram of the chest in 6-12 months is recommended to ensure  stability.     #SHON  :: hgb 9.3, hgb 8-9 since 11/17/23 w/ acute drop from ~14. At that time attributed to blood loss from procedure. No reported melena or BRBPR.   :: Iron 27, UIBC 394, TIBC 421, % sat 6, ferritin 6  -start iron supplement  -daily cbc     #Chronic pain  -continue methadone 20 mg q12     #tobacco use disorder  -nicotine patches     F: diuresing  E: K>4, Mg>2  N: Na restricted  A: PIV  GI ppx: home PPI  DVT ppx: home hina SORIA: Daughter Kelsey [phone]  Code Status: Full code     Dispo: admit to medicine    Pablo Reynoso MD
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
## 2024-02-26 NOTE — PROGRESS NOTES
2/26/2024 Care coordination  Marco Antonio Montalvo is a 73 y.o. male on day 3 of admission presenting with Acute on chronic systolic (congestive) heart failure (CMS/HCC).  [facility]  Discussion with intake Steve [phone].  States pt does have Medicare A&B .  Medicare # [id number].  Pt does not want to [facility] up  at the [facility]. RENEA will fax clinicals to Steve at the [phone].

## 2024-02-27 LAB
ALBUMIN SERPL BCP-MCNC: 3.7 G/DL (ref 3.4–5)
ALBUMIN SERPL BCP-MCNC: 3.9 G/DL (ref 3.4–5)
ANION GAP SERPL CALC-SCNC: 12 MMOL/L (ref 10–20)
ANION GAP SERPL CALC-SCNC: 15 MMOL/L (ref 10–20)
BUN SERPL-MCNC: 20 MG/DL (ref 6–23)
BUN SERPL-MCNC: 22 MG/DL (ref 6–23)
CALCIUM SERPL-MCNC: 8.3 MG/DL (ref 8.6–10.6)
CALCIUM SERPL-MCNC: 8.7 MG/DL (ref 8.6–10.6)
CHLORIDE SERPL-SCNC: 103 MMOL/L (ref 98–107)
CHLORIDE SERPL-SCNC: 104 MMOL/L (ref 98–107)
CO2 SERPL-SCNC: 26 MMOL/L (ref 21–32)
CO2 SERPL-SCNC: 27 MMOL/L (ref 21–32)
CREAT SERPL-MCNC: 1.14 MG/DL (ref 0.5–1.3)
CREAT SERPL-MCNC: 1.5 MG/DL (ref 0.5–1.3)
EGFRCR SERPLBLD CKD-EPI 2021: 49 ML/MIN/1.73M*2
EGFRCR SERPLBLD CKD-EPI 2021: 68 ML/MIN/1.73M*2
GLUCOSE SERPL-MCNC: 115 MG/DL (ref 74–99)
GLUCOSE SERPL-MCNC: 93 MG/DL (ref 74–99)
MAGNESIUM SERPL-MCNC: 1.8 MG/DL (ref 1.6–2.4)
MAGNESIUM SERPL-MCNC: 1.98 MG/DL (ref 1.6–2.4)
PHOSPHATE SERPL-MCNC: 4 MG/DL (ref 2.5–4.9)
PHOSPHATE SERPL-MCNC: 4.5 MG/DL (ref 2.5–4.9)
POTASSIUM SERPL-SCNC: 4.1 MMOL/L (ref 3.5–5.3)
POTASSIUM SERPL-SCNC: 4.3 MMOL/L (ref 3.5–5.3)
SODIUM SERPL-SCNC: 139 MMOL/L (ref 136–145)
SODIUM SERPL-SCNC: 140 MMOL/L (ref 136–145)

## 2024-02-27 PROCEDURE — 36415 COLL VENOUS BLD VENIPUNCTURE: CPT

## 2024-02-27 PROCEDURE — 2500000001 HC RX 250 WO HCPCS SELF ADMINISTERED DRUGS (ALT 637 FOR MEDICARE OP)

## 2024-02-27 PROCEDURE — 83735 ASSAY OF MAGNESIUM: CPT

## 2024-02-27 PROCEDURE — 99233 SBSQ HOSP IP/OBS HIGH 50: CPT

## 2024-02-27 PROCEDURE — 80069 RENAL FUNCTION PANEL: CPT | Mod: MUE

## 2024-02-27 PROCEDURE — S0109 METHADONE ORAL 5MG: HCPCS

## 2024-02-27 PROCEDURE — 2500000005 HC RX 250 GENERAL PHARMACY W/O HCPCS

## 2024-02-27 PROCEDURE — 2500000002 HC RX 250 W HCPCS SELF ADMINISTERED DRUGS (ALT 637 FOR MEDICARE OP, ALT 636 FOR OP/ED)

## 2024-02-27 PROCEDURE — 2500000004 HC RX 250 GENERAL PHARMACY W/ HCPCS (ALT 636 FOR OP/ED)

## 2024-02-27 PROCEDURE — 1200000002 HC GENERAL ROOM WITH TELEMETRY DAILY

## 2024-02-27 RX ORDER — FUROSEMIDE 10 MG/ML
80 INJECTION INTRAMUSCULAR; INTRAVENOUS ONCE
Status: COMPLETED | OUTPATIENT
Start: 2024-02-27 | End: 2024-02-27

## 2024-02-27 RX ORDER — METOLAZONE 5 MG/1
5 TABLET ORAL DAILY
Status: DISCONTINUED | OUTPATIENT
Start: 2024-02-27 | End: 2024-02-28

## 2024-02-27 RX ORDER — ACETAMINOPHEN 10 MG/ML
1000 INJECTION, SOLUTION INTRAVENOUS EVERY 6 HOURS SCHEDULED
Status: CANCELLED | OUTPATIENT
Start: 2024-02-28 | End: 2024-02-28

## 2024-02-27 RX ADMIN — QUETIAPINE FUMARATE 12.5 MG: 25 TABLET ORAL at 20:47

## 2024-02-27 RX ADMIN — OXYCODONE HYDROCHLORIDE AND ACETAMINOPHEN 1000 MG: 500 TABLET ORAL at 08:56

## 2024-02-27 RX ADMIN — LIDOCAINE 1 PATCH: 4 PATCH TOPICAL at 13:42

## 2024-02-27 RX ADMIN — PANTOPRAZOLE SODIUM 20 MG: 20 TABLET, DELAYED RELEASE ORAL at 07:00

## 2024-02-27 RX ADMIN — METOLAZONE 5 MG: 5 TABLET ORAL at 14:52

## 2024-02-27 RX ADMIN — FUROSEMIDE 80 MG: 10 INJECTION, SOLUTION INTRAMUSCULAR; INTRAVENOUS at 18:35

## 2024-02-27 RX ADMIN — MIRTAZAPINE 7.5 MG: 7.5 TABLET, FILM COATED ORAL at 20:43

## 2024-02-27 RX ADMIN — METOPROLOL SUCCINATE 25 MG: 25 TABLET, EXTENDED RELEASE ORAL at 08:55

## 2024-02-27 RX ADMIN — FUROSEMIDE 80 MG: 10 INJECTION, SOLUTION INTRAMUSCULAR; INTRAVENOUS at 13:42

## 2024-02-27 RX ADMIN — METHADONE HYDROCHLORIDE 20 MG: 10 TABLET ORAL at 18:34

## 2024-02-27 RX ADMIN — FERROUS SULFATE TAB 325 MG (65 MG ELEMENTAL FE) 1 TABLET: 325 (65 FE) TAB at 08:55

## 2024-02-27 RX ADMIN — ASPIRIN 81 MG: 81 TABLET, COATED ORAL at 08:56

## 2024-02-27 RX ADMIN — METHADONE HYDROCHLORIDE 20 MG: 10 TABLET ORAL at 06:36

## 2024-02-27 RX ADMIN — SPIRONOLACTONE 25 MG: 25 TABLET, FILM COATED ORAL at 08:56

## 2024-02-27 RX ADMIN — ATORVASTATIN CALCIUM 40 MG: 40 TABLET, FILM COATED ORAL at 20:43

## 2024-02-27 RX ADMIN — QUETIAPINE FUMARATE 12.5 MG: 25 TABLET ORAL at 08:55

## 2024-02-27 RX ADMIN — SACUBITRIL AND VALSARTAN 1 TABLET: 24; 26 TABLET, FILM COATED ORAL at 08:55

## 2024-02-27 RX ADMIN — SACUBITRIL AND VALSARTAN 1 TABLET: 24; 26 TABLET, FILM COATED ORAL at 20:43

## 2024-02-27 RX ADMIN — RIVAROXABAN 20 MG: 20 TABLET, FILM COATED ORAL at 18:35

## 2024-02-27 RX ADMIN — TAMSULOSIN HYDROCHLORIDE 0.4 MG: 0.4 CAPSULE ORAL at 08:55

## 2024-02-27 ASSESSMENT — COGNITIVE AND FUNCTIONAL STATUS - GENERAL
DAILY ACTIVITIY SCORE: 24
CLIMB 3 TO 5 STEPS WITH RAILING: A LITTLE
CLIMB 3 TO 5 STEPS WITH RAILING: A LITTLE
MOBILITY SCORE: 23
MOBILITY SCORE: 23
DAILY ACTIVITIY SCORE: 24

## 2024-02-27 ASSESSMENT — PAIN SCALES - GENERAL
PAINLEVEL_OUTOF10: 0 - NO PAIN
PAINLEVEL_OUTOF10: 10 - WORST POSSIBLE PAIN
PAINLEVEL_OUTOF10: 0 - NO PAIN

## 2024-02-27 NOTE — CARE PLAN
The patient's goals for the shift include      The clinical goals for the shift include Pt will remain HDS throughout shift    Over the shift, the patient did not make progress toward the following goals.  Problem: Pain  Goal: Takes deep breaths with improved pain control throughout the shift  Outcome: Not Progressing  Goal: Walks with improved pain control throughout the shift  Outcome: Not Progressing  Goal: Performs ADL's with improved pain control throughout shift  Outcome: Not Progressing  Goal: Participates in PT with improved pain control throughout the shift  Outcome: Not Progressing

## 2024-02-27 NOTE — PROGRESS NOTES
"Marco Antonio Montalvo is a 73 y.o. male on day 4 of admission presenting with Acute on chronic systolic (congestive) heart failure (CMS/HCC).    Subjective   NAEO. Patient does not feel that his leg swelling or his breathing has gotten better since being here. He also endorses sharp left anterior chest wall pain that is reproducible on exam.    Objective     Physical Exam  Constitutional: NAD  HEENT: EOMI, no scleral icterus, MMM  CV: regular, no m/r/g  Pulm: mild shortness of breath while speaking, diminished in bilateral bases, minimal crackles  MSK: TTP over left anterior chest wall  GI: Soft, nontender, non-distended  Extremities: 3+ pitting edema, firm  Skin: warm  Neuro: grossly alert and oriented    Last Recorded Vitals  Blood pressure 114/61, pulse 70, temperature 36.5 °C (97.7 °F), resp. rate 16, height 1.778 m (5' 10\"), weight 99.2 kg (218 lb 12.8 oz), SpO2 95 %.  Intake/Output last 3 Shifts:  I/O last 3 completed shifts:  In: 240 (2.4 mL/kg) [P.O.:240]  Out: 2850 (28.7 mL/kg) [Urine:2850 (0.8 mL/kg/hr)]  Weight: 99.2 kg     Relevant Results  Results for orders placed or performed during the hospital encounter of 02/23/24 (from the past 24 hour(s))   Renal function panel   Result Value Ref Range    Glucose 99 74 - 99 mg/dL    Sodium 139 136 - 145 mmol/L    Potassium 4.2 3.5 - 5.3 mmol/L    Chloride 106 98 - 107 mmol/L    Bicarbonate 25 21 - 32 mmol/L    Anion Gap 12 10 - 20 mmol/L    Urea Nitrogen 15 6 - 23 mg/dL    Creatinine 0.94 0.50 - 1.30 mg/dL    eGFR 86 >60 mL/min/1.73m*2    Calcium 8.5 (L) 8.6 - 10.6 mg/dL    Phosphorus 3.2 2.5 - 4.9 mg/dL    Albumin 3.7 3.4 - 5.0 g/dL   Magnesium   Result Value Ref Range    Magnesium 2.41 (H) 1.60 - 2.40 mg/dL   CBC and Auto Differential   Result Value Ref Range    WBC 6.1 4.4 - 11.3 x10*3/uL    nRBC 0.3 (H) 0.0 - 0.0 /100 WBCs    RBC 3.94 (L) 4.50 - 5.90 x10*6/uL    Hemoglobin 10.0 (L) 13.5 - 17.5 g/dL    Hematocrit 32.6 (L) 41.0 - 52.0 %    MCV 83 80 - 100 fL    MCH " 25.4 (L) 26.0 - 34.0 pg    MCHC 30.7 (L) 32.0 - 36.0 g/dL    RDW 16.2 (H) 11.5 - 14.5 %    Platelets 224 150 - 450 x10*3/uL    Neutrophils % 58.8 40.0 - 80.0 %    Immature Granulocytes %, Automated 0.5 0.0 - 0.9 %    Lymphocytes % 26.8 13.0 - 44.0 %    Monocytes % 10.9 2.0 - 10.0 %    Eosinophils % 2.5 0.0 - 6.0 %    Basophils % 0.5 0.0 - 2.0 %    Neutrophils Absolute 3.60 1.60 - 5.50 x10*3/uL    Immature Granulocytes Absolute, Automated 0.03 0.00 - 0.50 x10*3/uL    Lymphocytes Absolute 1.64 0.80 - 3.00 x10*3/uL    Monocytes Absolute 0.67 0.05 - 0.80 x10*3/uL    Eosinophils Absolute 0.15 0.00 - 0.40 x10*3/uL    Basophils Absolute 0.03 0.00 - 0.10 x10*3/uL   Transthoracic Echo (TTE) Complete   Result Value Ref Range    AV pk zach 1.44 m/s    AV mn grad 4.7 mmHg    LVOT diam 2.47 cm    MV avg E/e' ratio 6.37     MV E/A ratio 4.01     LA vol index A/L 52.2 ml/m2    Tricuspid annular plane systolic excursion 1.3 cm    RV free wall pk S' 6.00 cm/s    LVIDd 6.26 cm    RVSP 21.5 mmHg    Aortic Valve Area by Continuity of VTI 3.77 cm2    Aortic Valve Area by Continuity of Peak Velocity 4.06 cm2    AV pk grad 8.3 mmHg      CTPE  IMPRESSION:  1. No evidence of acute pulmonary embolism.  2. Dilated main pulmonary artery along with reflux of intravenous  contrast into upper SVC and central hepatic veins. Correlate with  concern for pulmonary hypertension and elevated right heart pressures.  3. Suggestion of mild left ventricular and left atrial enlargement  and correlate with echocardiogram.  4. Aneurysmal dilatation ascending thoracic aorta measuring up to 4.5  cm. Further evaluation is limited due to near noncontrast  opacification on present pulmonary arterial phase scan. A follow-up  CT/MR angiogram of the chest in 6-12 months is recommended to ensure  stability.  5. Severe coronary artery calcifications, status post CABG. Please  note that, the present study is not tailored for evaluation of  coronary arteries or bypass  grafts.  6. Multiple prominent to mildly enlarged mediastinal lymph nodes,  which are nonspecific, but may be reactive in nature. Attention on  follow-up imaging is recommended.  7. Patchy mosaic attenuation within bilateral lungs, which may  represent a component of small vessel/small airway disease.    CT A/P  IMPRESSION:  1. No evidence of acute pathology within the abdomen and pelvis.  2. Please refer to the same-day CTA chest regarding intrathoracic  findings.  3. Incidental findings of atrophy of the left hepatic lobe, chronic  pancreatitis, and nonobstructing bilateral nephrolithiasis as  described above.    Assessment/Plan   Principal Problem:    Acute on chronic systolic (congestive) heart failure (CMS/HCC)  Active Problems:    Acute on chronic systolic congestive heart failure (CMS/HCC)    Marco Antonio Montalvo is a 73 y.o. male PMH HFrEF (EF 48% in 11/23), CAD s/p PCI in 2001 and CABG x3 (LIMA-LAD, SVG-PDA, SVG-D2) 11/2023, A-fib RVR on xaretlo, cholelithiasis, nephrolithiasis c/b pyelonephritis, PTSD, depression, chronic pain and GERD who presents today with several days of worsening shortness of breath and abdominal pain. Worsening HUNTER, LE edema and orthopnea in setting of elevated BNP and vascular congestion on imaging. Will continue to diurese for suspected acute decompensation heart failure iso medication noncompliance.     Updates:  -s/p 80 mg IV lasix yesterday  -resumed metoprolol succinate 25 mg and started spironolactone 25 mg yesterday  -UOP 1.85 (NN1.6)  -wt 99.2kg from 102 on admission  -TTE w/ reduced EF 35-40% with global hypokinesis from 48% 11/23  -still significantly hypervolemic on exam, will give metolazone 5mg and lasix 80mg IV this AM, re-dose pending UOP in evening  -consider beginning jardiance if patient willing [facility] or [facility]     #Acute on Chronic Systolic HF (EF 48%, 11/23) s/t medication noncompliance  #CAD s/p CABG (11/23)  :: TTE 11/23 w/ EF 48%  post-CABG  :: dry weight ~190lbs, admission weight 210 lbs.  :: trop negative, BNP elevated at 219 (2000s at OSH)  :: supposed to be on torsemide 20 mg daily at home, metoprolol succinate ER 25 mg daily, lisinopril 5 daily  :: CXR w/ cardiomegaly and vascular congestion  :: CTPE w/ did note dilated main pulmonary artery, mild left ventricular and left atrial enlargement, a multiple prominent to mildly enlarged mediastinal lymph nodes, and patchy mosaic attenuation within bilateral lungs, which may represent a component of small vessel/small airway disease.  :: PA Systolic pressure 33 in 11/23 on PA cath, though in setting of diuresis  :: PFTs 11/23 with normal FEV1 and FVC but reduced ratio suggestive of mild obstructive pattern or normal variant. Significant bronchodilator response. Normal DLCO.  :: TTE w/ reduced EF 35-40% with global hypokinesis from 48% 11/23, likely s/t medication noncompliance  Plan  -Preload: lasix 80 BID, metolazone 5mg   -Afterload: entresto 24-26 BID  -NMB: spironolactone 25 mg, metoprolol succinate 25 mg  -SGLT2i: need to arrange f/up at [facility]  -continue home atorvastatin 40 mg  -strict I&O's, daily weights     #A fib w/ bradycardia  :: home meds- metoprolol succinate, xarelto 20 mg daily. Has not been taking.  -restarted metoprolol, continue xarelto  -CTM, if concern for sxs bradycardia may need to discuss PPM    #Depression vs Bipolar 1  #PTSD  #Social  :: patient teary and perseverating on time in   -not taking zoloft 100 mg daily  -psych consulted, impression was PTSD and Group B personality traits  -started mirtazapine 7.5 mg and seroquel 12.5 mg BID  -would benefit from home health care     #Abdominal Pain  #Diarrhea  :: CT A/P No evidence of acute pathology within the abdomen and pelvis. Incidental findings of atrophy of the left hepatic lobe, chronic  pancreatitis, and nonobstructing bilateral nephrolithiasis as described above  :: UA bland, LFTs wnl  -ctm, suspect may  be s/t constipation as patient states he not regular. Low suspicion for acute pancreatitis flare. Could also be component acid reflux  vs PUD as improves with PPI  -continue PPI  -needs outpatient endoscopy     #Thoracic Aortic Aneurysm  :: CT PE w/ aneurysmal dilatation ascending thoracic aorta measuring up to 4.5 cm  -follow-up CT/MR angiogram of the chest in 6-12 months is recommended to ensure  stability.     #SHON  :: hgb 9.3, hgb 8-9 since 11/17/23 w/ acute drop from ~14. At that time attributed to blood loss from procedure. No reported melena or BRBPR.   :: Iron 27, UIBC 394, TIBC 421, % sat 6, ferritin 6  -start iron supplement  -daily cbc     #Chronic pain  -continue methadone 20 mg q12     #tobacco use disorder  -nicotine patches     F: diuresing  E: K>4, Mg>2  N: Na restricted  A: PIV  GI ppx: home PPI  DVT ppx: home xarelto  DESK: Daughter Kelsey [phone]  Code Status: Full code     Dispo: admit to medicine    Brenna Stanton MD

## 2024-02-27 NOTE — PROGRESS NOTES
"Marco Antonio Montalvo is a 73 y.o. male on day 4 of admission presenting with Acute on chronic systolic (congestive) heart failure (CMS/HCC).      Subjective   NAEO, VSS. Patient is pleasant and cooperative. Reports poor sleep due to variety of reasons including recurring thoughts and nighttime interruptions. Today patient was very talkative, with story telling predominating encounter. He reports that the only way to control his PTSD symptoms is to find \"calm\" and that situations that he cannot control and certain interactions with people trigger his symptoms. He proceeded to tell me a few triggering stories about his time in [place], often getting teary-eyed. He perseverates on thinking that his heart is not the cause of his medical conditions, feeling like it's more his kidney stones and his chronic back pain. Regarding his A-fib he reports that he has \"had it my whole life.\" Denies SI/HI or AVH.      Objective   Last Recorded Vitals  Blood pressure 114/61, pulse 70, temperature 36.5 °C (97.7 °F), resp. rate 16, height 1.778 m (5' 10\"), weight 99.2 kg (218 lb 12.8 oz), SpO2 95 %.    Review of Systems  Insomnia- improved  No chest pain or SOB    Psychiatric ROS - Adult  Anxiety: Negative  Depression: anhedonia, energy, helpless, and passive thoughts of death  Delirium: negative  Psychosis: delusions  Vera: negative  Safety Issues: passive death wish    Mental Status Exam  General: NAD  Appearance: wearing hospital gown, appears tired   Attitude: calm, cooperative, engaged in conversation  Behavior: appropriate eye contact  Motor Activity: no psychomotor agitation or retardation, no abnormal movements  Speech: spontaneous, normal rate, volume, and tone  Mood: \"hurting (points to LUQ abdomen)...about the same\"  Affect: full range, congruent with mood  Thought Content: Today denies SI/plan/intent. Denies HI. No elicited grandiose or delusional thoughts today.  Thought Perception: Denies AH/VH. Does not appear to be " responding to internal stimuli.  Thought Process: organized, linear, goal-directed, associations were logical  Cognition: adequate attention and concentration, no gross deficits  Insight: fair  Judgement: fair    Psychiatric Risk Assessment  Violence Risk Assessment: male,  history or weapons training, pst history of violence, and personality disorder (antisocial, borderline)  Acute Risk of Harm to Others is Considered: low   Suicide Risk Assessment: age > 65 yrs old, borderline personality disorder, , chronic medical illness, chronic pain, current psychiatric illness, feelings of hopelessness, male, and other passive death wish  Protective Factors against Suicide: adherence to  treatment, hopefulness/future orientation, positive family relationships, Lutheran affiliation/spirituality, sense of responsibility toward family, social support/connectedness, and strong coping skills  Acute Risk of Harm to Self is Considered: low; chronic risk is moderate    Relevant Results  Results for orders placed or performed during the hospital encounter of 02/23/24 (from the past 24 hour(s))   Transthoracic Echo (TTE) Complete   Result Value Ref Range    AV pk zach 1.44 m/s    AV mn grad 4.7 mmHg    LVOT diam 2.47 cm    MV avg E/e' ratio 6.37     MV E/A ratio 4.01     LA vol index A/L 52.2 ml/m2    Tricuspid annular plane systolic excursion 1.3 cm    RV free wall pk S' 6.00 cm/s    LVIDd 6.26 cm    RVSP 21.5 mmHg    Aortic Valve Area by Continuity of VTI 3.77 cm2    Aortic Valve Area by Continuity of Peak Velocity 4.06 cm2    AV pk grad 8.3 mmHg      Assessment/Plan   Principal Problem:    Acute on chronic systolic (congestive) heart failure (CMS/HCC)  Active Problems:    Acute on chronic systolic congestive heart failure (CMS/HCC)    Marco Antonio Montalvo is a 73 y.o. male with a past psychiatric history of PTSD, depression  and a past medical history of CAD s/p PCI in 2001, AF RVR (8/2023), HFrEF 30%, nephrolithiasis,  "gall stones, and chronic pain who was admitted to [facility] for decompensated heart failure in setting of medication non adherence.  Psychiatry was consulted for anxiety and insomnia.      On initial evaluation, patient was organized and linear although quite grandiose at times. He reports history of PTSD and reports no benefit to psychiatric medications in past. He is presenting with a mixed picture of exacerbated insomnia that seems to be more environmental and demoralization stemming from recent health challenges.     While chronically risk of suicide is elevated, acutely risk is low. While he reports current passive suicidal thoughts, these appear unchanged from his baseline. When questioned further, admits he would never act on these thoughts for the sake of his children. He is future oriented on exam which is also reassuring.    Update 2/26: Patient reporting poor sleep but appears to be more situational than primary insomnia. As patient reports not wanting to take pills at discharge do not recommend any medication changes.      EKG: QTc of 437ms (Bazett) on most recent EKG from 2/25/24     IMPRESSION  PTSD  Demoralization  Cluster B traits      RECOMMENDATIONS  -No indication for inpatient psychiatry  -To evaluate decision-making capacity, recommend use of the Capacity Evaluation Tool. Search “ IP Capacity Evaluation under SmartText\" unless the patient has a legal guardian, in which case all decisions per the legal guardian.  -Patient does not require a 1:1 sitter from a psychiatric perspective at this time.  -Defer to primary team decision for 1:1 sitter for observation, elopement, other indication.     - Continue mirtazapine 7.5 mg PO at bedtime for insomnia.   - Continue quetiapine 12.5mg PO BID for anxiety  - Pain management per primary team.  - Consider engaging palliative care for goals of care discussion.     - Discussed recommendations with primary team.  - Patient expressed desire to engage with "  services as well as pet therapy.   - Psychiatry will continue to follow.     Thank you for allowing us to participate in the care of this patient. Please page [phone] with any questions or concerns.     Patient discussed with attending, Dr. Aldana, who agrees with above plan.     Medication Consent  Medication Consent: n/a; consult service     Dk Carroll MD PGY-4, psychiatry

## 2024-02-28 LAB
ALBUMIN SERPL BCP-MCNC: 4.1 G/DL (ref 3.4–5)
ANION GAP SERPL CALC-SCNC: 15 MMOL/L (ref 10–20)
BUN SERPL-MCNC: 31 MG/DL (ref 6–23)
CALCIUM SERPL-MCNC: 8.9 MG/DL (ref 8.6–10.6)
CHLORIDE SERPL-SCNC: 102 MMOL/L (ref 98–107)
CO2 SERPL-SCNC: 26 MMOL/L (ref 21–32)
CREAT SERPL-MCNC: 1.63 MG/DL (ref 0.5–1.3)
EGFRCR SERPLBLD CKD-EPI 2021: 44 ML/MIN/1.73M*2
GLUCOSE SERPL-MCNC: 101 MG/DL (ref 74–99)
MAGNESIUM SERPL-MCNC: 2.45 MG/DL (ref 1.6–2.4)
PHOSPHATE SERPL-MCNC: 4.8 MG/DL (ref 2.5–4.9)
POTASSIUM SERPL-SCNC: 4.4 MMOL/L (ref 3.5–5.3)
SODIUM SERPL-SCNC: 139 MMOL/L (ref 136–145)

## 2024-02-28 PROCEDURE — 2500000001 HC RX 250 WO HCPCS SELF ADMINISTERED DRUGS (ALT 637 FOR MEDICARE OP): Performed by: STUDENT IN AN ORGANIZED HEALTH CARE EDUCATION/TRAINING PROGRAM

## 2024-02-28 PROCEDURE — 2500000002 HC RX 250 W HCPCS SELF ADMINISTERED DRUGS (ALT 637 FOR MEDICARE OP, ALT 636 FOR OP/ED)

## 2024-02-28 PROCEDURE — 99233 SBSQ HOSP IP/OBS HIGH 50: CPT

## 2024-02-28 PROCEDURE — 2500000001 HC RX 250 WO HCPCS SELF ADMINISTERED DRUGS (ALT 637 FOR MEDICARE OP)

## 2024-02-28 PROCEDURE — 1200000002 HC GENERAL ROOM WITH TELEMETRY DAILY

## 2024-02-28 PROCEDURE — 36415 COLL VENOUS BLD VENIPUNCTURE: CPT

## 2024-02-28 PROCEDURE — 83735 ASSAY OF MAGNESIUM: CPT

## 2024-02-28 PROCEDURE — 2500000004 HC RX 250 GENERAL PHARMACY W/ HCPCS (ALT 636 FOR OP/ED)

## 2024-02-28 PROCEDURE — S0109 METHADONE ORAL 5MG: HCPCS

## 2024-02-28 PROCEDURE — 80069 RENAL FUNCTION PANEL: CPT

## 2024-02-28 RX ORDER — MAGNESIUM SULFATE HEPTAHYDRATE 40 MG/ML
2 INJECTION, SOLUTION INTRAVENOUS ONCE
Status: COMPLETED | OUTPATIENT
Start: 2024-02-28 | End: 2024-02-28

## 2024-02-28 RX ADMIN — OXYCODONE HYDROCHLORIDE AND ACETAMINOPHEN 1000 MG: 500 TABLET ORAL at 09:45

## 2024-02-28 RX ADMIN — SPIRONOLACTONE 25 MG: 25 TABLET, FILM COATED ORAL at 09:45

## 2024-02-28 RX ADMIN — QUETIAPINE FUMARATE 12.5 MG: 25 TABLET ORAL at 09:45

## 2024-02-28 RX ADMIN — SACUBITRIL AND VALSARTAN 1 TABLET: 24; 26 TABLET, FILM COATED ORAL at 09:45

## 2024-02-28 RX ADMIN — MAGNESIUM SULFATE HEPTAHYDRATE 2 G: 40 INJECTION, SOLUTION INTRAVENOUS at 06:29

## 2024-02-28 RX ADMIN — QUETIAPINE FUMARATE 12.5 MG: 25 TABLET ORAL at 20:10

## 2024-02-28 RX ADMIN — ASPIRIN 81 MG: 81 TABLET, COATED ORAL at 09:45

## 2024-02-28 RX ADMIN — FERROUS SULFATE TAB 325 MG (65 MG ELEMENTAL FE) 1 TABLET: 325 (65 FE) TAB at 09:45

## 2024-02-28 RX ADMIN — METOPROLOL SUCCINATE 25 MG: 25 TABLET, EXTENDED RELEASE ORAL at 09:45

## 2024-02-28 RX ADMIN — METHADONE HYDROCHLORIDE 20 MG: 10 TABLET ORAL at 20:10

## 2024-02-28 RX ADMIN — METOLAZONE 5 MG: 5 TABLET ORAL at 09:45

## 2024-02-28 RX ADMIN — PANTOPRAZOLE SODIUM 20 MG: 20 TABLET, DELAYED RELEASE ORAL at 06:30

## 2024-02-28 RX ADMIN — SACUBITRIL AND VALSARTAN 1 TABLET: 24; 26 TABLET, FILM COATED ORAL at 20:11

## 2024-02-28 RX ADMIN — ATORVASTATIN CALCIUM 40 MG: 40 TABLET, FILM COATED ORAL at 20:10

## 2024-02-28 RX ADMIN — TAMSULOSIN HYDROCHLORIDE 0.4 MG: 0.4 CAPSULE ORAL at 09:44

## 2024-02-28 RX ADMIN — METHADONE HYDROCHLORIDE 20 MG: 10 TABLET ORAL at 06:30

## 2024-02-28 ASSESSMENT — COGNITIVE AND FUNCTIONAL STATUS - GENERAL
DAILY ACTIVITIY SCORE: 24
CLIMB 3 TO 5 STEPS WITH RAILING: A LITTLE
MOBILITY SCORE: 23

## 2024-02-28 ASSESSMENT — PAIN SCALES - GENERAL
PAINLEVEL_OUTOF10: 5 - MODERATE PAIN
PAINLEVEL_OUTOF10: 9
PAINLEVEL_OUTOF10: 3
PAINLEVEL_OUTOF10: 9

## 2024-02-28 ASSESSMENT — PAIN DESCRIPTION - DESCRIPTORS: DESCRIPTORS: ACHING

## 2024-02-28 ASSESSMENT — PAIN - FUNCTIONAL ASSESSMENT
PAIN_FUNCTIONAL_ASSESSMENT: 0-10
PAIN_FUNCTIONAL_ASSESSMENT: 0-10

## 2024-02-28 NOTE — PROGRESS NOTES
"Marco Antonio Montalvo is a 73 y.o. male on day 5 of admission presenting with Acute on chronic systolic (congestive) heart failure (CMS/HCC).    Subjective   Patient feeling groggy and sleepy this AM. Believes it is secondary to medications he took yesterday.     Objective     Physical Exam  Constitutional: NAD  HEENT: EOMI, no scleral icterus, MMM  CV: regular, no m/r/g  Pulm: mild shortness of breath while speaking, diminished in bilateral bases, minimal crackles  GI: Soft, nontender, non-distended  Extremities: 3+ pitting edema, firm  Skin: warm  Neuro: grossly oriented, sleepy throughout conversation    Last Recorded Vitals  Blood pressure 112/65, pulse 57, temperature 36.3 °C (97.3 °F), resp. rate 17, height 1.778 m (5' 10\"), weight 98.2 kg (216 lb 7.9 oz), SpO2 92 %.  Intake/Output last 3 Shifts:  I/O last 3 completed shifts:  In: 720 (7.3 mL/kg) [P.O.:720]  Out: 2500 (25.5 mL/kg) [Urine:2500 (0.7 mL/kg/hr)]  Weight: 98.2 kg     Relevant Results  Results for orders placed or performed during the hospital encounter of 02/23/24 (from the past 24 hour(s))   Renal function panel   Result Value Ref Range    Glucose 93 74 - 99 mg/dL    Sodium 140 136 - 145 mmol/L    Potassium 4.3 3.5 - 5.3 mmol/L    Chloride 103 98 - 107 mmol/L    Bicarbonate 26 21 - 32 mmol/L    Anion Gap 15 10 - 20 mmol/L    Urea Nitrogen 22 6 - 23 mg/dL    Creatinine 1.50 (H) 0.50 - 1.30 mg/dL    eGFR 49 (L) >60 mL/min/1.73m*2    Calcium 8.3 (L) 8.6 - 10.6 mg/dL    Phosphorus 4.5 2.5 - 4.9 mg/dL    Albumin 3.9 3.4 - 5.0 g/dL   Magnesium   Result Value Ref Range    Magnesium 1.80 1.60 - 2.40 mg/dL   Renal function panel   Result Value Ref Range    Glucose 101 (H) 74 - 99 mg/dL    Sodium 139 136 - 145 mmol/L    Potassium 4.4 3.5 - 5.3 mmol/L    Chloride 102 98 - 107 mmol/L    Bicarbonate 26 21 - 32 mmol/L    Anion Gap 15 10 - 20 mmol/L    Urea Nitrogen 31 (H) 6 - 23 mg/dL    Creatinine 1.63 (H) 0.50 - 1.30 mg/dL    eGFR 44 (L) >60 mL/min/1.73m*2    " Calcium 8.9 8.6 - 10.6 mg/dL    Phosphorus 4.8 2.5 - 4.9 mg/dL    Albumin 4.1 3.4 - 5.0 g/dL   Magnesium   Result Value Ref Range    Magnesium 2.45 (H) 1.60 - 2.40 mg/dL      CTPE  IMPRESSION:  1. No evidence of acute pulmonary embolism.  2. Dilated main pulmonary artery along with reflux of intravenous  contrast into upper SVC and central hepatic veins. Correlate with  concern for pulmonary hypertension and elevated right heart pressures.  3. Suggestion of mild left ventricular and left atrial enlargement  and correlate with echocardiogram.  4. Aneurysmal dilatation ascending thoracic aorta measuring up to 4.5  cm. Further evaluation is limited due to near noncontrast  opacification on present pulmonary arterial phase scan. A follow-up  CT/MR angiogram of the chest in 6-12 months is recommended to ensure  stability.  5. Severe coronary artery calcifications, status post CABG. Please  note that, the present study is not tailored for evaluation of  coronary arteries or bypass grafts.  6. Multiple prominent to mildly enlarged mediastinal lymph nodes,  which are nonspecific, but may be reactive in nature. Attention on  follow-up imaging is recommended.  7. Patchy mosaic attenuation within bilateral lungs, which may  represent a component of small vessel/small airway disease.    CT A/P  IMPRESSION:  1. No evidence of acute pathology within the abdomen and pelvis.  2. Please refer to the same-day CTA chest regarding intrathoracic  findings.  3. Incidental findings of atrophy of the left hepatic lobe, chronic  pancreatitis, and nonobstructing bilateral nephrolithiasis as  described above.    Assessment/Plan   Principal Problem:    Acute on chronic systolic (congestive) heart failure (CMS/HCC)  Active Problems:    Acute on chronic systolic congestive heart failure (CMS/HCC)    Marco Antonio Montalvo is a 73 y.o. male PMH HFrEF (EF 48% in 11/23), CAD s/p PCI in 2001 and CABG x3 (LIMA-LAD, SVG-PDA, SVG-D2) 11/2023, A-fib RVR on  xaretlo, cholelithiasis, nephrolithiasis c/b pyelonephritis, PTSD, depression, chronic pain and GERD who presents today with several days of worsening shortness of breath and abdominal pain. Worsening HUNTER, LE edema and orthopnea in setting of elevated BNP and vascular congestion on imaging. Will continue to diurese for suspected acute decompensation heart failure iso medication noncompliance.     Updates:  -s/p 80 mg IV lasix BID and metolazone 5 mg yesterday  -UOP 900cc . wt 98.2kg from 102 on admission  -f/up Cr, if cotninuing to uptrend may need RHC to better assess volume status  -hold diuresis today     #Acute on Chronic Systolic HF (EF 48%, 11/23) s/t medication noncompliance  #CAD s/p CABG (11/23)  :: TTE 11/23 w/ EF 48% post-CABG  :: dry weight ~190lbs, admission weight 210 lbs.  :: trop negative, BNP elevated at 219 (2000s at OSH)  :: supposed to be on torsemide 20 mg daily at home, metoprolol succinate ER 25 mg daily, lisinopril 5 daily  :: CXR w/ cardiomegaly and vascular congestion  :: CTPE w/ did note dilated main pulmonary artery, mild left ventricular and left atrial enlargement, a multiple prominent to mildly enlarged mediastinal lymph nodes, and patchy mosaic attenuation within bilateral lungs, which may represent a component of small vessel/small airway disease.  :: PA Systolic pressure 33 in 11/23 on PA cath, though in setting of diuresis  :: PFTs 11/23 with normal FEV1 and FVC but reduced ratio suggestive of mild obstructive pattern or normal variant. Significant bronchodilator response. Normal DLCO.  :: TTE w/ reduced EF 35-40% with global hypokinesis from 48% 11/23, likely s/t medication noncompliance  Plan  -Preload: holding diuresis  -Afterload: entresto 24-26 BID  -NMB: spironolactone 25 mg, metoprolol succinate 25 mg  -SGLT2i: [facility]  -continue home atorvastatin 40 mg  -consider RHC for further eval of volume status given minimal response to diuresis and  worsening ROSALBA  -strict I&O's, daily weights    #ROSALBA  :: Cr 0.91 on admission -> 1.63, uptrending with diuresis  -diuretic holiday today  -ctm     #A fib w/ bradycardia  :: home meds- metoprolol succinate, xarelto 20 mg daily. Has not been taking.  -restarted metoprolol, continue xarelto  -CTM, if concern for sxs bradycardia may need to discuss PPM    #Depression vs Bipolar 1  #PTSD  #Social  :: patient teary and perseverating on time in   -not taking zoloft 100 mg daily  -psych consulted, impression was PTSD and Group B personality traits  -started seroquel 12.5 mg BID, will hold mirtazapine given lasting sedation   -would benefit from home health care     #Abdominal Pain  #Diarrhea  :: CT A/P No evidence of acute pathology within the abdomen and pelvis. Incidental findings of atrophy of the left hepatic lobe, chronic  pancreatitis, and nonobstructing bilateral nephrolithiasis as described above  :: UA bland, LFTs wnl  -ctm, suspect may be s/t constipation as patient states he not regular. Low suspicion for acute pancreatitis flare. Could also be component acid reflux  vs PUD as improves with PPI  -continue PPI  -needs outpatient endoscopy     #Thoracic Aortic Aneurysm  :: CT PE w/ aneurysmal dilatation ascending thoracic aorta measuring up to 4.5 cm  -follow-up CT/MR angiogram of the chest in 6-12 months is recommended to ensure  stability.     #SHON  :: hgb 9.3, hgb 8-9 since 11/17/23 w/ acute drop from ~14. At that time attributed to blood loss from procedure. No reported melena or BRBPR.   :: Iron 27, UIBC 394, TIBC 421, % sat 6, ferritin 6  -start iron supplement  -daily cbc     #Chronic pain  -continue methadone 20 mg q12     #tobacco use disorder  -nicotine patches     F: diuresing  E: K>4, Mg>2  N: Na restricted  A: PIV  GI ppx: home PPI  DVT ppx: home xarelto  NOK: Daughter Kelsey [phone]  Code Status: Full code      Brenna Stanton MD

## 2024-02-28 NOTE — CARE PLAN
The patient's goals for the shift include      The clinical goals for the shift include pt will sleep a minimumof 4 hours by the end of this shift      Problem: Heart Failure  Goal: Improved gas exchange this shift  Outcome: Progressing  Goal: Improved urinary output this shift  Outcome: Progressing  Goal: Reduction in peripheral edema within 24 hours  Outcome: Progressing  Goal: Report improvement of dyspnea/breathlessness this shift  Outcome: Progressing  Goal: Weight from fluid excess reduced over 2-3 days, then stabilize  Outcome: Progressing  Goal: Increase self care and/or family involvement in 24 hours  Outcome: Progressing

## 2024-02-29 LAB
ABO GROUP (TYPE) IN BLOOD: NORMAL
ALBUMIN SERPL BCP-MCNC: 3.5 G/DL (ref 3.4–5)
ALBUMIN SERPL BCP-MCNC: 3.8 G/DL (ref 3.4–5)
ANION GAP SERPL CALC-SCNC: 14 MMOL/L (ref 10–20)
ANION GAP SERPL CALC-SCNC: 16 MMOL/L (ref 10–20)
ANTIBODY SCREEN: NORMAL
BNP SERPL-MCNC: 68 PG/ML (ref 0–99)
BUN SERPL-MCNC: 32 MG/DL (ref 6–23)
BUN SERPL-MCNC: 34 MG/DL (ref 6–23)
CALCIUM SERPL-MCNC: 8.1 MG/DL (ref 8.6–10.6)
CALCIUM SERPL-MCNC: 8.3 MG/DL (ref 8.6–10.6)
CHLORIDE SERPL-SCNC: 101 MMOL/L (ref 98–107)
CHLORIDE SERPL-SCNC: 102 MMOL/L (ref 98–107)
CHLORIDE UR-SCNC: 118 MMOL/L
CHLORIDE/CREATININE (MMOL/G) IN URINE: 330 MMOL/G CREAT (ref 23–275)
CO2 SERPL-SCNC: 22 MMOL/L (ref 21–32)
CO2 SERPL-SCNC: 24 MMOL/L (ref 21–32)
CREAT SERPL-MCNC: 1.29 MG/DL (ref 0.5–1.3)
CREAT SERPL-MCNC: 1.4 MG/DL (ref 0.5–1.3)
CREAT UR-MCNC: 35.8 MG/DL (ref 20–370)
CREAT UR-MCNC: 35.8 MG/DL (ref 20–370)
EGFRCR SERPLBLD CKD-EPI 2021: 53 ML/MIN/1.73M*2
EGFRCR SERPLBLD CKD-EPI 2021: 59 ML/MIN/1.73M*2
GLUCOSE SERPL-MCNC: 86 MG/DL (ref 74–99)
GLUCOSE SERPL-MCNC: 92 MG/DL (ref 74–99)
MAGNESIUM SERPL-MCNC: 2.1 MG/DL (ref 1.6–2.4)
MAGNESIUM SERPL-MCNC: 2.19 MG/DL (ref 1.6–2.4)
PHOSPHATE SERPL-MCNC: 3.9 MG/DL (ref 2.5–4.9)
PHOSPHATE SERPL-MCNC: 4.4 MG/DL (ref 2.5–4.9)
POTASSIUM SERPL-SCNC: 4.4 MMOL/L (ref 3.5–5.3)
POTASSIUM SERPL-SCNC: 4.8 MMOL/L (ref 3.5–5.3)
POTASSIUM UR-SCNC: 16 MMOL/L
POTASSIUM/CREAT UR-RTO: 45 MMOL/G CREAT
PROT UR-ACNC: <4 MG/DL (ref 5–25)
PROT/CREAT UR: ABNORMAL MG/G{CREAT}
RH FACTOR (ANTIGEN D): NORMAL
SODIUM SERPL-SCNC: 135 MMOL/L (ref 136–145)
SODIUM SERPL-SCNC: 135 MMOL/L (ref 136–145)
SODIUM UR-SCNC: 109 MMOL/L
SODIUM/CREAT UR-RTO: 304 MMOL/G CREAT

## 2024-02-29 PROCEDURE — 1200000002 HC GENERAL ROOM WITH TELEMETRY DAILY

## 2024-02-29 PROCEDURE — 2500000004 HC RX 250 GENERAL PHARMACY W/ HCPCS (ALT 636 FOR OP/ED)

## 2024-02-29 PROCEDURE — 36415 COLL VENOUS BLD VENIPUNCTURE: CPT

## 2024-02-29 PROCEDURE — 2500000002 HC RX 250 W HCPCS SELF ADMINISTERED DRUGS (ALT 637 FOR MEDICARE OP, ALT 636 FOR OP/ED)

## 2024-02-29 PROCEDURE — 83735 ASSAY OF MAGNESIUM: CPT

## 2024-02-29 PROCEDURE — 2500000001 HC RX 250 WO HCPCS SELF ADMINISTERED DRUGS (ALT 637 FOR MEDICARE OP)

## 2024-02-29 PROCEDURE — 84156 ASSAY OF PROTEIN URINE: CPT

## 2024-02-29 PROCEDURE — 99233 SBSQ HOSP IP/OBS HIGH 50: CPT

## 2024-02-29 PROCEDURE — 2500000005 HC RX 250 GENERAL PHARMACY W/O HCPCS

## 2024-02-29 PROCEDURE — 97165 OT EVAL LOW COMPLEX 30 MIN: CPT | Mod: GO

## 2024-02-29 PROCEDURE — 80069 RENAL FUNCTION PANEL: CPT | Mod: MUE

## 2024-02-29 PROCEDURE — 82436 ASSAY OF URINE CHLORIDE: CPT

## 2024-02-29 PROCEDURE — 83880 ASSAY OF NATRIURETIC PEPTIDE: CPT

## 2024-02-29 PROCEDURE — S0109 METHADONE ORAL 5MG: HCPCS

## 2024-02-29 PROCEDURE — 80069 RENAL FUNCTION PANEL: CPT

## 2024-02-29 PROCEDURE — 86901 BLOOD TYPING SEROLOGIC RH(D): CPT

## 2024-02-29 RX ORDER — FUROSEMIDE 10 MG/ML
80 INJECTION INTRAMUSCULAR; INTRAVENOUS ONCE
Status: COMPLETED | OUTPATIENT
Start: 2024-02-29 | End: 2024-02-29

## 2024-02-29 RX ORDER — LIDOCAINE 560 MG/1
1 PATCH PERCUTANEOUS; TOPICAL; TRANSDERMAL ONCE AS NEEDED
Status: COMPLETED | OUTPATIENT
Start: 2024-02-29 | End: 2024-02-29

## 2024-02-29 RX ORDER — LISINOPRIL 5 MG/1
5 TABLET ORAL DAILY
Status: DISCONTINUED | OUTPATIENT
Start: 2024-03-02 | End: 2024-03-04 | Stop reason: HOSPADM

## 2024-02-29 RX ORDER — METOLAZONE 5 MG/1
5 TABLET ORAL DAILY
Status: DISCONTINUED | OUTPATIENT
Start: 2024-02-29 | End: 2024-03-01

## 2024-02-29 RX ADMIN — FUROSEMIDE 80 MG: 10 INJECTION, SOLUTION INTRAMUSCULAR; INTRAVENOUS at 11:14

## 2024-02-29 RX ADMIN — PANTOPRAZOLE SODIUM 20 MG: 20 TABLET, DELAYED RELEASE ORAL at 08:19

## 2024-02-29 RX ADMIN — QUETIAPINE FUMARATE 12.5 MG: 25 TABLET ORAL at 21:26

## 2024-02-29 RX ADMIN — OXYCODONE HYDROCHLORIDE AND ACETAMINOPHEN 1000 MG: 500 TABLET ORAL at 08:20

## 2024-02-29 RX ADMIN — SPIRONOLACTONE 25 MG: 25 TABLET, FILM COATED ORAL at 08:20

## 2024-02-29 RX ADMIN — METHADONE HYDROCHLORIDE 20 MG: 10 TABLET ORAL at 21:26

## 2024-02-29 RX ADMIN — ASPIRIN 81 MG: 81 TABLET, COATED ORAL at 08:20

## 2024-02-29 RX ADMIN — ATORVASTATIN CALCIUM 40 MG: 40 TABLET, FILM COATED ORAL at 21:26

## 2024-02-29 RX ADMIN — METHADONE HYDROCHLORIDE 20 MG: 10 TABLET ORAL at 08:21

## 2024-02-29 RX ADMIN — QUETIAPINE FUMARATE 12.5 MG: 25 TABLET ORAL at 08:21

## 2024-02-29 RX ADMIN — FERROUS SULFATE TAB 325 MG (65 MG ELEMENTAL FE) 1 TABLET: 325 (65 FE) TAB at 08:20

## 2024-02-29 RX ADMIN — TAMSULOSIN HYDROCHLORIDE 0.4 MG: 0.4 CAPSULE ORAL at 08:20

## 2024-02-29 RX ADMIN — METOPROLOL SUCCINATE 25 MG: 25 TABLET, EXTENDED RELEASE ORAL at 08:20

## 2024-02-29 RX ADMIN — METOLAZONE 5 MG: 5 TABLET ORAL at 11:14

## 2024-02-29 RX ADMIN — LIDOCAINE 1 PATCH: 4 PATCH TOPICAL at 04:25

## 2024-02-29 ASSESSMENT — PAIN DESCRIPTION - DESCRIPTORS
DESCRIPTORS: BURNING
DESCRIPTORS: BURNING
DESCRIPTORS: ACHING
DESCRIPTORS: ACHING;SORE

## 2024-02-29 ASSESSMENT — PAIN SCALES - PAIN ASSESSMENT IN ADVANCED DEMENTIA (PAINAD)
BREATHING: NORMAL
BODYLANGUAGE: RELAXED
TOTALSCORE: 0
CONSOLABILITY: NO NEED TO CONSOLE
FACIALEXPRESSION: SMILING OR INEXPRESSIVE

## 2024-02-29 ASSESSMENT — PAIN - FUNCTIONAL ASSESSMENT
PAIN_FUNCTIONAL_ASSESSMENT: 0-10

## 2024-02-29 ASSESSMENT — COGNITIVE AND FUNCTIONAL STATUS - GENERAL
HELP NEEDED FOR BATHING: A LITTLE
DRESSING REGULAR LOWER BODY CLOTHING: A LITTLE
DAILY ACTIVITIY SCORE: 22

## 2024-02-29 ASSESSMENT — PAIN SCALES - GENERAL
PAINLEVEL_OUTOF10: 7
PAINLEVEL_OUTOF10: 2
PAINLEVEL_OUTOF10: 8
PAINLEVEL_OUTOF10: 5 - MODERATE PAIN
PAINLEVEL_OUTOF10: 4

## 2024-02-29 ASSESSMENT — PAIN DESCRIPTION - LOCATION: LOCATION: BACK

## 2024-02-29 ASSESSMENT — PAIN DESCRIPTION - ORIENTATION: ORIENTATION: POSTERIOR;LOWER

## 2024-02-29 ASSESSMENT — ACTIVITIES OF DAILY LIVING (ADL)
ADL_ASSISTANCE: INDEPENDENT
BATHING_ASSISTANCE: MINIMAL

## 2024-02-29 NOTE — PRE-SEDATION DOCUMENTATION
Sedation Plan    ASA 2     Mallampati class: unable to assess.    Risks, benefits, and alternatives discussed with patient.

## 2024-02-29 NOTE — INTERVAL H&P NOTE
H&P reviewed. The patient was examined and there are no changes to the H&P.  Interviewed patient in room.  Refused to let me listen to him with stethoscope   Warm and dry   alert and oriented x 3, psyche following, legs are swollen, warm to touch.  Refused to open mouth for mallampati score

## 2024-02-29 NOTE — PROGRESS NOTES
Spiritual Care Visit    Clinical Encounter Type  Visited With: Patient  Routine Visit: Introduction  Continue Visiting: Yes  Referral To:          Values/Beliefs  Cultural Requests During Hospitalization: patient is a [place]  with PTSD. Please be mindful of this.  Spiritual Requests During Hospitalization: support, life review    Sacramental Encounters  Communion: Ask the patient  Communion Given Indicator: No    Patient Spiritual Care Encounters  Suffering Severity: Moderate  Fear Level: Substantial  Feelings of Loneliness: Moderate  Feelings of Hopelessness: Good              PC-7 Assessment (Level of Unmet Needs)  Existential Struggle: Substantial  Spiritual/Hindu Struggle: Some  Legacy: Some  Relationships: Some  Fear of Death/Dying: Further assess  Values/Medical Decision Making: Some  Ritual/Other: Further assess  PC-7 Score: 6    SDAT (Spiritual Distress Assessment Tool)  Need for Life Balance: Substancial evidence of unmet spiritual need  Need for Connection: Substancial evidence of unmet spiritual need  Need for Values Acknowledgement: Substancial evidence of unmet spiritual need  Need to Maintain Control: Substancial evidence of unmet spiritual need  Need to Maintain Identity: Substancial evidence of unmet spiritual need  SDAT Score: 10  SDAT Average Score: 2    Taxonomy  Intended Effects: Build relationship of care and support, Demonstrate caring and concern, Convey a calming presence, Meaning-making  Methods: Demonstrate acceptance, Encourage story-telling, Explore temi and values, Offer emotional support, Offer support  Interventions: Active listening, Ask guided questions, Ask guided questions about temi, Ask questions to bring forth feelings, Discuss concerns, Facilitate life review, Provide hospitality, Silent prayer    Initial spiritual care visit with patient this afternoon. Pt shared he went to [place] at the age of 18. He was a gunner on a helicopter. He said [place]  "wouldn't have  been so bad had \"it all been bad, but there were good times, too.\"  asked patient if this caused confusion for him as a young man. He did acknowledge that yes, it did. Pt is leery of opening up. He has a good defense mechanism in his stating \"all is well. I'm not sad, I'm fine. No [place] did not affect me\"   used her curiosity to get to more underlying feelings patient may be experiencing. He did open up at times, and at times seemed close to tears. In my experience working with Veterans, particularly, [place] Veterans, there is a lot of moral injury. There is also a need to reconcile things that happened. Pt said he was raised Anglican. His grandmother took him to Protestant. He does believe in God, and by the very nature that he is alive, he states he is \"forgiven.\"  asked patient about how he wants his life to be now. Pt wants to get better, but he does have suspicions regarding the medical system and community. Pt has a daughter and a son. His mother was murdered in 1986 and he also lost a lot of family members that year.  Patient has experienced a great deal of loss and has experienced trauma.  listened as he shared some stories.  asked guided questions and provided acceptance and a non anxious presence. Pt said he would like  to return.  will follow. Pt was getting tired and wanted to sleep.  will follow again.  "

## 2024-02-29 NOTE — PROGRESS NOTES
Occupational Therapy    Evaluation    Patient Name: Marco Antonio Montalvo  MRN: [id number]  Today's Date: 2/29/2024  Time Calculation  Start Time: 0936  Stop Time: 0959  Time Calculation (min): 23 min    Assessment  IP OT Assessment  OT Assessment: Pt demonstrates decreased ability to engagein ADLs/IADLs due to increased pain and lack of endurance. Pt would benefit from skilled OT to address these impairments.  Prognosis: Good  End of Session Patient Position: Bed, 2 rail up (Pt seated EOB upon end of OT session)  Plan:  Treatment Interventions: ADL retraining, Functional transfer training, UE strengthening/ROM, Endurance training, Equipment evaluation/education, Compensatory technique education  OT Frequency: 3 times per week  OT Discharge Recommendations: Low intensity level of continued care  OT Recommended Transfer Status: Stand by assist  OT - OK to Discharge: Yes    Subjective     General:  General  Reason for Referral: ADLs, deconditioning  Past Medical History Relevant to Rehab: CHF,CAD, CABG x3, afib, PTSD, depression, GERD, chronic pain  Prior to Session Communication: Bedside nurse  Patient Position Received: Bed, 2 rail up  Preferred Learning Style: verbal, visual  General Comment: Pt appeared supine in bed with feet elevated at EOB upon arrival. Pt willing to work with OT. Pt reports being tired due to medication and not sleeping. Pt reports he is interested in working with therapy to increase strength and endurance and education on both.  Precautions:  Medical Precautions: Fall precautions  Precautions Comment: Pt reports no recent falls and that he has no trouble with safety or falls.  Vital Signs:     Pain:  Pain Assessment  Pain Assessment: 0-10  Pain Score: 7  Pain Location: Back  Pain Orientation: Lower  Pain Descriptors: Aching, Sore  Pain Frequency: Constant/continuous  Pain Onset: Ongoing  Multiple Pain Sites: Three  Pain 2  Pain Score 2: 5 - Moderate pain  Pain Location 2: Knee  Pain Orientation 2:  Right, Left  Pain Descriptors 2: Aching (swollen)  Pain Frequency 2: Constant/continuous  Pain 3  Pain Location 3: Hand  Pain Orientation 3: Right  Pain Descriptors 3: Burning  Pain Onset 3: On-going    Objective   Cognition:  Overall Cognitive Status: Within Functional Limits           Home Living:  Type of Home: House  Lives With: Alone  Home Adaptive Equipment: Cane, Walker rolling or standard  Home Layout: One level  Home Access: Stairs to enter without rails  Entrance Stairs-Number of Steps: 2  Bathroom Shower/Tub: Tub/shower unit  Bathroom Toilet: Standard  Bathroom Equipment: Shower chair with back, Grab bars in shower  Bathroom Accessibility: Pt reports he is not showering/bathing in current house he is staying at. He goes to friends house to utilize their bathroom [facility] shower chair and grab bars in shower.  Home Living Comments: Pt reports he has been house sitting at best friend's sisters ranch style house for the time being. Has access to walker and cane but does not use around the house.   Prior Function:  Level of [place]: Independent with ADLs and functional transfers, Independent with homemaking with ambulation  Receives Help From: Family, Friends  ADL Assistance: Independent  Homemaking Assistance: Independent  Ambulatory Assistance: Independent  Vocational: Retired  Leisure: Pt enjoys boating and fishing  Hand Dominance: Right  Prior Function Comments: Pt reports he was independent in all ADLs/IADLs prior to admission, though things have been getting difficult for him due to pain and lack of endurance. (cooking, cleaning, bathing)  IADL History:  Homemaking Responsibilities: Yes  Current License: Yes  Mode of Transportation: Car  Occupation: Retired  Leisure and Hobbies: fishing and boating  IADL Comments: Was completing independently, though admits things have started to get hard for him to complete without assistance.  ADL:  Eating Assistance: Independent  Grooming Assistance:  Independent  Bathing Assistance: Minimal  Bathing Deficit: Increased time to complete , Supervision/safety, Steadying, Right lower leg including foot, Left lower leg including foot, Use of adaptive equipment (anticipated, pt reports increased difficulty in ind. bathing)  UE Dressing Assistance: Independent  LE Dressing Assistance: Moderate  LE Dressing Deficit: Supervision/safety, Setup, Increased time to complete, Tie shoes, Don/doff R sock, Don/doff L sock, Thread LLE into pants, Thread LLE into underwear, Don/doff L shoe (Pt demonstrated ability to don R sock independently by bringing foot up to knee, required max assist for L sock due to swelling and pain.)  Toileting Assistance with Device: Independent  Functional Assistance: Independent  ADL Comments: Pt reports increased difficulty in self care activities due to pain and endurance. Pt easy to fatigue while doing standing or upright activities.  Activity Tolerance:  Endurance: Decreased tolerance for upright activites  Activity Tolerance Comments: Pt demonstrated functional mobility to and from bathroom wtihout device with distant supervision, reports easiy fatigued.  Bed Mobility/Transfers: Bed Mobility  Bed Mobility: Yes  Bed Mobility 1  Bed Mobility 1: Supine to sitting, Scooting  Level of Assistance 1: Distant supervision  Bed Mobility Comments 1: Pt demonstrated supine to sitting EOB and scooting EOB with distant supervision for safety.    Transfers  Transfer: Yes  Transfer 1  Transfer From 1: Sit to, Stand to  Transfer to 1: Stand, Sit  Technique 1: Sit to stand, Stand to sit  Transfer Level of Assistance 1: Close supervision  Trials/Comments 1: Pt demonstrated sit to stand and stand to sit transfers with supervision for safety.      Ambulation/Gait Training:  Ambulation/Gait Training  Ambulation/Gait Training Performed: Yes  Ambulation/Gait Training 1  Device 1: No device  Assistance 1: Distant supervision  Comments/Distance (ft) 1: Pt demosntrated  functional mobility to and from bathroom from EOB  Sitting Balance:  Static Sitting Balance  Static Sitting-Balance Support: No upper extremity supported  Static Sitting-Level of Assistance: Distant supervision  Static Sitting-Comment/Number of Minutes: Pt tolerated sitting EOB UEs unsupported while answering subjective questions with OT.  Dynamic Sitting Balance  Dynamic Sitting-Comments: L/R UEs supported one at a time back and forth while donning R sock at EOB with close supervision for safety.  Modalities:     IADL's:   Homemaking Responsibilities: Yes  Current License: Yes  Mode of Transportation: Car  Occupation: Retired  Leisure and Hobbies: fishing and boating  IADL Comments: Was completing independently, though admits things have started to get hard for him to complete without assistance.    Sensation:  Sensation Comment: Pt reports burning in R hand, ongoing  Strength:  Strength Comments: Pt reports decreased overall strength in previous 2 years    Extremities: RUE   RUE : Within Functional Limits and LUE   LUE: Within Functional Limits    Outcome Measures: [facility] Daily Activity  Putting on and taking off regular lower body clothing: A little  Bathing (including washing, rinsing, drying): A little  Putting on and taking off regular upper body clothing: None  Toileting, which includes using toilet, bedpan or urinal: None  Taking care of personal grooming such as brushing teeth: None  Eating Meals: None  Daily Activity - Total Score: 22      Education Documentation  Body Mechanics, taught by DARREL Cuevas at 2/29/2024 11:29 AM.  Learner: Patient  Readiness: Acceptance  Method: Explanation  Response: Verbalizes Understanding    Precautions, taught by DARREL Cuevas at 2/29/2024 11:29 AM.  Learner: Patient  Readiness: Acceptance  Method: Explanation  Response: Verbalizes Understanding    Home Exercise Program, taught by DARREL Cuevas at 2/29/2024 11:29 AM.  Learner: Patient  Readiness:  Acceptance  Method: Explanation  Response: Verbalizes Understanding    ADL Training, taught by DARREL Cuevas at 2/29/2024 11:29 AM.  Learner: Patient  Readiness: Acceptance  Method: Explanation  Response: Verbalizes Understanding    Education Comments  No comments found.      Goals:   Encounter Problems       Encounter Problems (Active)       ADLs       Patient will perform LB bathing with modified independent level of assistance and PRN adaptive equipment. (Progressing)       Start:  02/29/24    Expected End:  03/21/24            Patient with complete lower body dressing with independent level of assistance donning and doffing all LE clothes  with PRN adaptive equipment while edge of bed  (Progressing)       Start:  02/29/24    Expected End:  03/21/24               BALANCE       Pt will maintain dynamic standing balance during ADL task with supervision level of assistance in order to demonstrate decreased risk of falling and improved overall endurance.  (Progressing)       Start:  02/29/24    Expected End:  03/21/24               COGNITION/SAFETY       Pt will demonstrate energy conservation techniques while completing ADL tasks with min verbal cues to increase pt safety and independence in ADLs/IADLs. (Progressing)       Start:  02/29/24    Expected End:  03/21/24               EXERCISE/STRENGTHENING       Patient with improve BUE strength to continue independence in ADLs and IADLs. (Progressing)       Start:  02/29/24    Expected End:  03/21/24
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
## 2024-02-29 NOTE — CARE PLAN
Problem: Heart Failure  Goal: Improved gas exchange this shift  Outcome: Progressing  Goal: Improved urinary output this shift  Outcome: Progressing  Goal: Reduction in peripheral edema within 24 hours  Outcome: Progressing  Goal: Report improvement of dyspnea/breathlessness this shift  Outcome: Progressing  Goal: Weight from fluid excess reduced over 2-3 days, then stabilize  Outcome: Progressing  Goal: Increase self care and/or family involvement in 24 hours  Outcome: Progressing   The patient's goals for the shift include      The clinical goals for the shift include free from falls

## 2024-02-29 NOTE — PROGRESS NOTES
"Marco Antonio Montalvo is a 73 y.o. male on day 6 of admission presenting with Acute on chronic systolic (congestive) heart failure (CMS/HCC).    Subjective   Patient more alert this aM after holding mirtazapine. Feels his breathing is still labored and the swelling in his legs hasn't improved much.     Objective     Physical Exam  Constitutional: NAD  HEENT: EOMI, no scleral icterus, MMM  CV: regular, no m/r/g  Pulm: diminished in bilateral bases, minimal crackles in bases  GI: Soft, nontender, non-distended  Extremities: significant BL LE pitting edema, firm  Skin: warm  Neuro: grossly oriented, sleepy throughout conversation    Last Recorded Vitals  Blood pressure 102/59, pulse 60, temperature 36.6 °C (97.9 °F), resp. rate 18, height 1.778 m (5' 10\"), weight 98.9 kg (218 lb 2.3 oz), SpO2 97 %.  Intake/Output last 3 Shifts:  I/O last 3 completed shifts:  In: - (0 mL/kg)   Out: 2900 (29.3 mL/kg) [Urine:2900 (0.8 mL/kg/hr)]  Weight: 98.9 kg     Relevant Results  Results for orders placed or performed during the hospital encounter of 02/23/24 (from the past 24 hour(s))   Renal function panel   Result Value Ref Range    Glucose 92 74 - 99 mg/dL    Sodium 135 (L) 136 - 145 mmol/L    Potassium 4.4 3.5 - 5.3 mmol/L    Chloride 101 98 - 107 mmol/L    Bicarbonate 24 21 - 32 mmol/L    Anion Gap 14 10 - 20 mmol/L    Urea Nitrogen 32 (H) 6 - 23 mg/dL    Creatinine 1.29 0.50 - 1.30 mg/dL    eGFR 59 (L) >60 mL/min/1.73m*2    Calcium 8.3 (L) 8.6 - 10.6 mg/dL    Phosphorus 4.4 2.5 - 4.9 mg/dL    Albumin 3.8 3.4 - 5.0 g/dL   Magnesium   Result Value Ref Range    Magnesium 2.19 1.60 - 2.40 mg/dL   B-type natriuretic peptide   Result Value Ref Range    BNP 68 0 - 99 pg/mL   Type and Screen   Result Value Ref Range    ABO TYPE B     Rh TYPE POS     ANTIBODY SCREEN NEG         Assessment/Plan   Principal Problem:    Acute on chronic systolic (congestive) heart failure (CMS/[facility])  Active Problems:    Acute on chronic systolic congestive heart " failure (CMS/[facility])    Marco Antonio Montalvo is a 73 y.o. male PMH HFrEF (EF 48% in 11/23), CAD s/p PCI in 2001 and CABG x3 (LIMA-LAD, SVG-PDA, SVG-D2) 11/2023, A-fib RVR on xaretlo, cholelithiasis, nephrolithiasis c/b pyelonephritis, PTSD, depression, chronic pain and GERD who presents today with several days of worsening shortness of breath and abdominal pain. Worsening HUNTER, LE edema and orthopnea in setting of elevated BNP and vascular congestion on imaging. Will continue to diurese for suspected acute decompensation heart failure iso medication noncompliance.     Updates:  -held diuresis yesterday, UOP 2.9L  -will give metolazone 5 mg and lasix 80 IV this AM, f/up I/O's in evening  -BNP 69 from 219 on admission  -plan for RHC today  -Cr 1.29 from 1.63     #Acute on Chronic Systolic HF (EF 48%, 11/23) s/t medication noncompliance  #CAD s/p CABG (11/23)  :: TTE 11/23 w/ EF 48% post-CABG  :: dry weight ~190lbs, admission weight 210 lbs.  :: trop negative, BNP elevated at 219 (2000s at OSH)  :: supposed to be on torsemide 20 mg daily at home, metoprolol succinate ER 25 mg daily, lisinopril 5 daily  :: CXR w/ cardiomegaly and vascular congestion  :: CTPE w/ did note dilated main pulmonary artery, mild left ventricular and left atrial enlargement, a multiple prominent to mildly enlarged mediastinal lymph nodes, and patchy mosaic attenuation within bilateral lungs, which may represent a component of small vessel/small airway disease.  :: PA Systolic pressure 33 in 11/23 on PA cath, though in setting of diuresis  :: PFTs 11/23 with normal FEV1 and FVC but reduced ratio suggestive of mild obstructive pattern or normal variant. Significant bronchodilator response. Normal DLCO.  :: TTE w/ reduced EF 35-40% with global hypokinesis from 48% 11/23, likely s/t medication noncompliance  Plan  -Preload: metolazone 5mg, lasix 80 IV  -Afterload: entresto held as not covered, will need to resume lisinopril 5 after 36 hr washout  -NMB:  spironolactone 25 mg, metoprolol succinate 25 mg  -SGLT2i: limited by affordability  -continue home atorvastatin 40 mg  -RHC today given significant persistent hypervolemia on exam and normal BNP  -strict I&O's, daily weights    #ROSALBA  :: Cr 0.91 on admission -> 1.63 with diuresis -> 1.29 following diuretic holiday 2/29  -ctm     #A fib w/ bradycardia  :: home meds- metoprolol succinate, xarelto 20 mg daily. Has not been taking.  -restarted metoprolol, continue xarelto  -CTM, if concern for sxs bradycardia may need to discuss PPM    #Depression vs Bipolar 1  #PTSD  #Social  :: not taking zoloft 100 mg daily  -psych consulted, now signed off. Impression was PTSD and Group B personality traits  -started seroquel 12.5 mg BID, stopped mirtazapine given lasting sedation   -would benefit from home health care     #Abdominal Pain  #Diarrhea  :: CT A/P No evidence of acute pathology within the abdomen and pelvis. Incidental findings of atrophy of the left hepatic lobe, chronic  pancreatitis, and nonobstructing bilateral nephrolithiasis as described above  :: UA bland, LFTs wnl  -ctm, suspect may be s/t constipation as patient states he not regular. Low suspicion for acute pancreatitis flare. Could also be component acid reflux  vs PUD as improves with PPI  -continue PPI  -needs outpatient endoscopy     #Thoracic Aortic Aneurysm  :: CT PE w/ aneurysmal dilatation ascending thoracic aorta measuring up to 4.5 cm  -follow-up CT/MR angiogram of the chest in 6-12 months is recommended to ensure  stability.     #SHON  :: hgb 9.3, hgb 8-9 since 11/17/23 w/ acute drop from ~14. At that time attributed to blood loss from procedure. No reported melena or BRBPR.   :: Iron 27, UIBC 394, TIBC 421, % sat 6, ferritin 6  -start iron supplement  -daily cbc     #Chronic pain  -continue methadone 20 mg q12     #tobacco use disorder  -nicotine patches     F: diuresing  E: K>4, Mg>2  N: Na restricted  A: PIV  GI ppx: home PPI  DVT ppx: home  hina SORIA: Daughter Kelsey [phone]  Code Status: Full code      Brenna Stanton MD

## 2024-02-29 NOTE — PROGRESS NOTES
Physical Therapy                 Therapy Communication Note    Patient Name: Marco Antonio Montalvo  MRN: [id number]  Today's Date: 2/29/2024     Discipline: Physical Therapy    Missed Visit Reason: Missed Visit Reason: Patient refused    Missed Time: Attempt    Comment: The pt was aggressively rude the entire PT evaluation attempt.

## 2024-03-01 LAB
ALBUMIN SERPL BCP-MCNC: 3.8 G/DL (ref 3.4–5)
ANION GAP SERPL CALC-SCNC: 17 MMOL/L (ref 10–20)
BUN SERPL-MCNC: 45 MG/DL (ref 6–23)
CALCIUM SERPL-MCNC: 8.3 MG/DL (ref 8.6–10.6)
CHLORIDE SERPL-SCNC: 99 MMOL/L (ref 98–107)
CO2 SERPL-SCNC: 23 MMOL/L (ref 21–32)
CREAT SERPL-MCNC: 1.82 MG/DL (ref 0.5–1.3)
EGFRCR SERPLBLD CKD-EPI 2021: 39 ML/MIN/1.73M*2
GLUCOSE SERPL-MCNC: 120 MG/DL (ref 74–99)
MAGNESIUM SERPL-MCNC: 2.19 MG/DL (ref 1.6–2.4)
PHOSPHATE SERPL-MCNC: 5.5 MG/DL (ref 2.5–4.9)
POTASSIUM SERPL-SCNC: 5 MMOL/L (ref 3.5–5.3)
SODIUM SERPL-SCNC: 134 MMOL/L (ref 136–145)

## 2024-03-01 PROCEDURE — 2500000001 HC RX 250 WO HCPCS SELF ADMINISTERED DRUGS (ALT 637 FOR MEDICARE OP)

## 2024-03-01 PROCEDURE — 99233 SBSQ HOSP IP/OBS HIGH 50: CPT

## 2024-03-01 PROCEDURE — 2500000002 HC RX 250 W HCPCS SELF ADMINISTERED DRUGS (ALT 637 FOR MEDICARE OP, ALT 636 FOR OP/ED)

## 2024-03-01 PROCEDURE — S4991 NICOTINE PATCH NONLEGEND: HCPCS

## 2024-03-01 PROCEDURE — 82374 ASSAY BLOOD CARBON DIOXIDE: CPT

## 2024-03-01 PROCEDURE — 36415 COLL VENOUS BLD VENIPUNCTURE: CPT

## 2024-03-01 PROCEDURE — 1200000002 HC GENERAL ROOM WITH TELEMETRY DAILY

## 2024-03-01 PROCEDURE — 2500000004 HC RX 250 GENERAL PHARMACY W/ HCPCS (ALT 636 FOR OP/ED)

## 2024-03-01 PROCEDURE — 81003 URINALYSIS AUTO W/O SCOPE: CPT

## 2024-03-01 PROCEDURE — 83735 ASSAY OF MAGNESIUM: CPT

## 2024-03-01 PROCEDURE — S0109 METHADONE ORAL 5MG: HCPCS

## 2024-03-01 RX ORDER — ACETAMINOPHEN 500 MG
5 TABLET ORAL ONCE
Status: COMPLETED | OUTPATIENT
Start: 2024-03-01 | End: 2024-03-01

## 2024-03-01 RX ORDER — FUROSEMIDE 10 MG/ML
80 INJECTION INTRAMUSCULAR; INTRAVENOUS ONCE
Status: COMPLETED | OUTPATIENT
Start: 2024-03-01 | End: 2024-03-01

## 2024-03-01 RX ADMIN — FERROUS SULFATE TAB 325 MG (65 MG ELEMENTAL FE) 1 TABLET: 325 (65 FE) TAB at 09:28

## 2024-03-01 RX ADMIN — METOLAZONE 5 MG: 5 TABLET ORAL at 09:28

## 2024-03-01 RX ADMIN — ACETAMINOPHEN 975 MG: 325 TABLET ORAL at 00:58

## 2024-03-01 RX ADMIN — NICOTINE 1 PATCH: 14 PATCH, EXTENDED RELEASE TRANSDERMAL at 21:10

## 2024-03-01 RX ADMIN — Medication 5 MG: at 01:15

## 2024-03-01 RX ADMIN — RIVAROXABAN 20 MG: 20 TABLET, FILM COATED ORAL at 17:57

## 2024-03-01 RX ADMIN — ATORVASTATIN CALCIUM 40 MG: 40 TABLET, FILM COATED ORAL at 21:03

## 2024-03-01 RX ADMIN — FUROSEMIDE 80 MG: 10 INJECTION, SOLUTION INTRAMUSCULAR; INTRAVENOUS at 12:59

## 2024-03-01 RX ADMIN — QUETIAPINE FUMARATE 12.5 MG: 25 TABLET ORAL at 21:03

## 2024-03-01 RX ADMIN — SPIRONOLACTONE 25 MG: 25 TABLET, FILM COATED ORAL at 09:28

## 2024-03-01 RX ADMIN — ACETAMINOPHEN 975 MG: 325 TABLET ORAL at 19:42

## 2024-03-01 RX ADMIN — METOPROLOL SUCCINATE 25 MG: 25 TABLET, EXTENDED RELEASE ORAL at 09:28

## 2024-03-01 RX ADMIN — METHADONE HYDROCHLORIDE 20 MG: 10 TABLET ORAL at 21:03

## 2024-03-01 RX ADMIN — TAMSULOSIN HYDROCHLORIDE 0.4 MG: 0.4 CAPSULE ORAL at 09:28

## 2024-03-01 RX ADMIN — OXYCODONE HYDROCHLORIDE AND ACETAMINOPHEN 1000 MG: 500 TABLET ORAL at 09:28

## 2024-03-01 RX ADMIN — QUETIAPINE FUMARATE 12.5 MG: 25 TABLET ORAL at 09:28

## 2024-03-01 RX ADMIN — METHADONE HYDROCHLORIDE 20 MG: 10 TABLET ORAL at 09:28

## 2024-03-01 RX ADMIN — ASPIRIN 81 MG: 81 TABLET, COATED ORAL at 09:28

## 2024-03-01 ASSESSMENT — PAIN SCALES - GENERAL
PAINLEVEL_OUTOF10: 0 - NO PAIN
PAINLEVEL_OUTOF10: 8
PAINLEVEL_OUTOF10: 0 - NO PAIN
PAINLEVEL_OUTOF10: 9
PAINLEVEL_OUTOF10: 5 - MODERATE PAIN
PAINLEVEL_OUTOF10: 9
PAINLEVEL_OUTOF10: 7
PAINLEVEL_OUTOF10: 8

## 2024-03-01 ASSESSMENT — COGNITIVE AND FUNCTIONAL STATUS - GENERAL
MOBILITY SCORE: 24
DAILY ACTIVITIY SCORE: 24
DAILY ACTIVITIY SCORE: 24
MOBILITY SCORE: 24

## 2024-03-01 ASSESSMENT — PAIN - FUNCTIONAL ASSESSMENT
PAIN_FUNCTIONAL_ASSESSMENT: 0-10

## 2024-03-01 ASSESSMENT — PAIN SCALES - PAIN ASSESSMENT IN ADVANCED DEMENTIA (PAINAD)
FACIALEXPRESSION: SMILING OR INEXPRESSIVE
CONSOLABILITY: NO NEED TO CONSOLE
BREATHING: NORMAL
BODYLANGUAGE: RELAXED
TOTALSCORE: 0

## 2024-03-01 ASSESSMENT — PAIN DESCRIPTION - DESCRIPTORS
DESCRIPTORS: BURNING
DESCRIPTORS: BURNING

## 2024-03-01 ASSESSMENT — PAIN DESCRIPTION - LOCATION: LOCATION: BACK

## 2024-03-01 NOTE — NURSING NOTE
Pt's BP is 90/58 manually. Pt does endorse mild dizziness. Pt is due for 20mg of methadone and 12.5mg of seroquel. Paged the MD on call. MD states to give the scheduled meds. No additional orders given. Will continue to monitor per POC

## 2024-03-01 NOTE — CARE PLAN
The patient's goals for the shift include      The clinical goals for the shift include Patient will remain HDS throughout shift      Problem: Heart Failure  Goal: Improved gas exchange this shift  Outcome: Progressing  Goal: Improved urinary output this shift  Outcome: Progressing  Goal: Reduction in peripheral edema within 24 hours  Outcome: Progressing  Goal: Report improvement of dyspnea/breathlessness this shift  Outcome: Progressing  Goal: Weight from fluid excess reduced over 2-3 days, then stabilize  Outcome: Progressing  Goal: Increase self care and/or family involvement in 24 hours  Outcome: Progressing     Problem: Pain  Goal: Takes deep breaths with improved pain control throughout the shift  Outcome: Progressing  Goal: Turns in bed with improved pain control throughout the shift  Outcome: Progressing  Goal: Walks with improved pain control throughout the shift  Outcome: Progressing  Goal: Performs ADL's with improved pain control throughout shift  Outcome: Progressing  Goal: Participates in PT with improved pain control throughout the shift  Outcome: Progressing  Goal: Free from opioid side effects throughout the shift  Outcome: Progressing  Goal: Free from acute confusion related to pain meds throughout the shift  Outcome: Progressing     Problem: Pain - Adult  Goal: Verbalizes/displays adequate comfort level or baseline comfort level  Outcome: Progressing     Problem: Safety - Adult  Goal: Free from fall injury  Outcome: Progressing     Problem: Discharge Planning  Goal: Discharge to home or other facility with appropriate resources  Outcome: Progressing     Problem: Chronic Conditions and Co-morbidities  Goal: Patient's chronic conditions and co-morbidity symptoms are monitored and maintained or improved  Outcome: Progressing

## 2024-03-01 NOTE — PROGRESS NOTES
"Marco Antonio Montalvo is a 73 y.o. male on day 7 of admission presenting with Acute on chronic systolic (congestive) heart failure (CMS/HCC).    Subjective   Patient is awake, alert and cooperative on exam this morning resting in bed.  He reports overnight some mild chest pain, shortness of breath, and dizziness but not present this morning.  He otherwise reports no new symptoms or complaints overnight.  He reports no nausea, vomiting, fever, sweating, chills, or palpitations.       Objective     Physical Exam  HENT:      Head: Normocephalic and atraumatic.   Eyes:      General: No scleral icterus.     Extraocular Movements: Extraocular movements intact.      Pupils: Pupils are equal, round, and reactive to light.   Cardiovascular:      Rate and Rhythm: Normal rate and regular rhythm.      Heart sounds: No murmur heard.     No friction rub. No gallop.   Pulmonary:      Breath sounds: Rales present. No wheezing or rhonchi.   Abdominal:      General: Bowel sounds are normal. There is no distension.      Palpations: Abdomen is soft.      Tenderness: There is no abdominal tenderness. There is no guarding.   Musculoskeletal:      Right lower leg: Edema present.      Left lower leg: Edema present.      Comments: Left flank tenderness to palpation   Neurological:      General: No focal deficit present.      Mental Status: He is oriented to person, place, and time.   Psychiatric:         Mood and Affect: Mood normal.         Behavior: Behavior normal.         Last Recorded Vitals  Blood pressure 108/53, pulse (!) 48, temperature 36.3 °C (97.3 °F), temperature source Temporal, resp. rate 14, height 1.778 m (5' 10\"), weight 97.6 kg (215 lb 1.6 oz), SpO2 94 %.  Intake/Output last 3 Shifts:  I/O last 3 completed shifts:  In: 240 (2.5 mL/kg) [P.O.:240]  Out: 3100 (31.8 mL/kg) [Urine:3100 (0.9 mL/kg/hr)]  Weight: 97.6 kg     Relevant Results    Scheduled medications  ascorbic acid, 1,000 mg, oral, Daily  aspirin, 81 mg, oral, " Daily  atorvastatin, 40 mg, oral, Daily  ferrous sulfate (325 mg ferrous sulfate), 65 mg of iron, oral, Daily with breakfast  furosemide, 80 mg, intravenous, Once  [START ON 3/2/2024] lisinopril, 5 mg, oral, Daily  methadone, 20 mg, oral, q12h  metoprolol succinate XL, 25 mg, oral, Daily  [Held by provider] mirtazapine, 7.5 mg, oral, Nightly  pantoprazole, 20 mg, oral, Daily before breakfast  [Held by provider] polyethylene glycol, 17 g, oral, Daily  QUEtiapine, 12.5 mg, oral, BID  [Held by provider] rivaroxaban, 20 mg, oral, Daily with evening meal  spironolactone, 25 mg, oral, Daily  tamsulosin, 0.4 mg, oral, Daily    Continuous medications     PRN medications  PRN medications: acetaminophen, albuterol, diclofenac sodium, nicotine    Results for orders placed or performed during the hospital encounter of 02/23/24 (from the past 24 hour(s))   Protein, Urine Random   Result Value Ref Range    Total Protein, Urine Random <4 (L) 5 - 25 mg/dL    Creatinine, Urine Random 35.8 20.0 - 370.0 mg/dL    T. Protein/Creatinine Ratio     Urine electrolytes   Result Value Ref Range    Sodium, Urine Random 109 mmol/L    Sodium/Creatinine Ratio 304 Not established. mmol/g Creat    Potassium, Urine Random 16 mmol/L    Potassium/Creatinine Ratio 45 Not established mmol/g Creat    Chloride, Urine Random 118 mmol/L    Chloride/Creatinine Ratio 330 (H) 23 - 275 mmol/g creat    Creatinine, Urine Random 35.8 20.0 - 370.0 mg/dL   Renal function panel   Result Value Ref Range    Glucose 86 74 - 99 mg/dL    Sodium 135 (L) 136 - 145 mmol/L    Potassium 4.8 3.5 - 5.3 mmol/L    Chloride 102 98 - 107 mmol/L    Bicarbonate 22 21 - 32 mmol/L    Anion Gap 16 10 - 20 mmol/L    Urea Nitrogen 34 (H) 6 - 23 mg/dL    Creatinine 1.40 (H) 0.50 - 1.30 mg/dL    eGFR 53 (L) >60 mL/min/1.73m*2    Calcium 8.1 (L) 8.6 - 10.6 mg/dL    Phosphorus 3.9 2.5 - 4.9 mg/dL    Albumin 3.5 3.4 - 5.0 g/dL   Magnesium   Result Value Ref Range    Magnesium 2.10 1.60 - 2.40  mg/dL       Assessment/Plan   Principal Problem:    Acute on chronic systolic (congestive) heart failure (CMS/HCC)  Active Problems:    Acute on chronic systolic congestive heart failure (CMS/HCC)    ASSESSMENT:    Marco Antonio Montalvo is a 73 y.o. male PMH HFrEF (EF 48% in 11/23), CAD s/p PCI in 2001 and CABG x3 (LIMA-LAD, SVG-PDA, SVG-D2) 11/2023, A-fib RVR on xaretlo, cholelithiasis, nephrolithiasis c/b pyelonephritis, PTSD, depression, chronic pain and GERD who presents today with several days of worsening shortness of breath and abdominal pain. Worsening HUNTER, LE edema and orthopnea in setting of elevated BNP and vascular congestion on imaging. Will continue to diurese for suspected acute decompensation heart failure iso medication noncompliance.      UPDATES 3/1:  - Patient refusing RHC this morning, called cath lab and canceled, will medically manage  - Gave metolazone 5 mg and lasix 80mg IV this AM (3/1)  - Additional dose of Lasix 80mg IV this evening (3/1)  - Cr 1.40 from 1.29  - Ordered Renal US and UA with reflex, c/f nephrolithiasis  - Resuming Xarelto since no plan for RHC  [ ] f/u PM RFP, will replete lytes     PLAN:    #Acute on Chronic Systolic HF (EF 48%, 11/23) s/t medication noncompliance  #CAD s/p CABG (11/23)  :: TTE 11/23 w/ EF 48% post-CABG  :: dry weight ~190lbs, admission weight 210 lbs.  :: trop negative, BNP elevated at 219 (2000s at OSH)  :: supposed to be on torsemide 20 mg daily at home, metoprolol succinate ER 25 mg daily, lisinopril 5 daily  :: CXR w/ cardiomegaly and vascular congestion  :: CTPE w/ did note dilated main pulmonary artery, mild left ventricular and left atrial enlargement, a multiple prominent to mildly enlarged mediastinal lymph nodes, and patchy mosaic attenuation within bilateral lungs, which may represent a component of small vessel/small airway disease.  :: PA Systolic pressure 33 in 11/23 on PA cath, though in setting of diuresis  :: PFTs 11/23 with normal FEV1 and  FVC but reduced ratio suggestive of mild obstructive pattern or normal variant. Significant bronchodilator response. Normal DLCO.  :: TTE w/ reduced EF 35-40% with global hypokinesis from 48% 11/23, likely s/t medication noncompliance  Plan  -Preload: metolazone 5mg, lasix 80 IV  -Afterload: entresto held as not covered, will need to resume lisinopril 5 after 36 hr washout  -NMB: spironolactone 25 mg, metoprolol succinate 25 mg  -SGLT2i: limited by affordability  -continue home atorvastatin 40 mg  -RHC cancelled due to patient refusing for second consecutive day, will medically manage  -strict I&O's, daily weights     #ROSALBA  :: Cr 0.91 on admission -> 1.63 with diuresis -> 1.29 following diuretic holiday 2/29, 1.40 3/1  -ctm     #A fib w/ bradycardia  :: home meds- metoprolol succinate, xarelto 20 mg daily. Has not been taking.  -restarted metoprolol, continue xarelto  -CTM, if concern for sxs bradycardia may need to discuss PPM     #Depression vs Bipolar 1  #PTSD  #Social  :: not taking zoloft 100 mg daily  -psych consulted, now signed off. Impression was PTSD and Group B personality traits  -started seroquel 12.5 mg BID, stopped mirtazapine given lasting sedation   -would benefit from home health care     #Abdominal Pain  #Diarrhea  :: CT A/P No evidence of acute pathology within the abdomen and pelvis. Incidental findings of atrophy of the left hepatic lobe, chronic  pancreatitis, and nonobstructing bilateral nephrolithiasis as described above  :: UA bland, LFTs wnl  -ctm, suspect may be s/t constipation as patient states he not regular. Low suspicion for acute pancreatitis flare. Could also be component acid reflux  vs PUD as improves with PPI  -continue PPI  -needs outpatient endoscopy  -Endorsing left flank pain 3/1, ordered Renal US and UA w/ reflex     #Thoracic Aortic Aneurysm  :: CT PE w/ aneurysmal dilatation ascending thoracic aorta measuring up to 4.5 cm  -follow-up CT/MR angiogram of the chest in 6-12  months is recommended to ensure  stability.     #SHON  :: hgb 9.3, hgb 8-9 since 11/17/23 w/ acute drop from ~14. At that time attributed to blood loss from procedure. No reported melena or BRBPR.   :: Iron 27, UIBC 394, TIBC 421, % sat 6, ferritin 6  -start iron supplement  -daily cbc     #Chronic pain  -continue methadone 20 mg q12     #tobacco use disorder  -nicotine patches     F: diuresing  E: K>4, Mg>2  N: Na restricted  A: PIV  GI ppx: home PPI  DVT ppx: home xarelto  NOK: Daughter Kelsey [phone]  Code Status: Full code           Zeus Feliciano MD

## 2024-03-01 NOTE — INTERVAL H&P NOTE
H&P reviewed. The patient was examined and there are no changes to the H&P. Patient alert and cooperative this morning and agreeable to procedure. Reports feeling groggy from melatonin last night. No orthopnea, denies SOB.

## 2024-03-02 LAB
ALBUMIN SERPL BCP-MCNC: 3.9 G/DL (ref 3.4–5)
ANION GAP SERPL CALC-SCNC: 17 MMOL/L (ref 10–20)
APPEARANCE UR: CLEAR
BASOPHILS # BLD AUTO: 0.03 X10*3/UL (ref 0–0.1)
BASOPHILS NFR BLD AUTO: 0.6 %
BILIRUB UR STRIP.AUTO-MCNC: NEGATIVE MG/DL
BUN SERPL-MCNC: 55 MG/DL (ref 6–23)
CALCIUM SERPL-MCNC: 8.5 MG/DL (ref 8.6–10.6)
CHLORIDE SERPL-SCNC: 101 MMOL/L (ref 98–107)
CO2 SERPL-SCNC: 25 MMOL/L (ref 21–32)
COLOR UR: NORMAL
CREAT SERPL-MCNC: 1.72 MG/DL (ref 0.5–1.3)
EGFRCR SERPLBLD CKD-EPI 2021: 41 ML/MIN/1.73M*2
EOSINOPHIL # BLD AUTO: 0.18 X10*3/UL (ref 0–0.4)
EOSINOPHIL NFR BLD AUTO: 3.6 %
ERYTHROCYTE [DISTWIDTH] IN BLOOD BY AUTOMATED COUNT: 16.7 % (ref 11.5–14.5)
GLUCOSE SERPL-MCNC: 116 MG/DL (ref 74–99)
GLUCOSE UR STRIP.AUTO-MCNC: NORMAL MG/DL
HCT VFR BLD AUTO: 31.8 % (ref 41–52)
HGB BLD-MCNC: 9.8 G/DL (ref 13.5–17.5)
HOLD SPECIMEN: NORMAL
IMM GRANULOCYTES # BLD AUTO: 0.01 X10*3/UL (ref 0–0.5)
IMM GRANULOCYTES NFR BLD AUTO: 0.2 % (ref 0–0.9)
KETONES UR STRIP.AUTO-MCNC: NEGATIVE MG/DL
LEUKOCYTE ESTERASE UR QL STRIP.AUTO: NEGATIVE
LYMPHOCYTES # BLD AUTO: 1.3 X10*3/UL (ref 0.8–3)
LYMPHOCYTES NFR BLD AUTO: 25.9 %
MAGNESIUM SERPL-MCNC: 2.06 MG/DL (ref 1.6–2.4)
MAGNESIUM SERPL-MCNC: 2.08 MG/DL (ref 1.6–2.4)
MCH RBC QN AUTO: 25.3 PG (ref 26–34)
MCHC RBC AUTO-ENTMCNC: 30.8 G/DL (ref 32–36)
MCV RBC AUTO: 82 FL (ref 80–100)
MONOCYTES # BLD AUTO: 0.57 X10*3/UL (ref 0.05–0.8)
MONOCYTES NFR BLD AUTO: 11.4 %
NEUTROPHILS # BLD AUTO: 2.92 X10*3/UL (ref 1.6–5.5)
NEUTROPHILS NFR BLD AUTO: 58.3 %
NITRITE UR QL STRIP.AUTO: NEGATIVE
NRBC BLD-RTO: 0 /100 WBCS (ref 0–0)
PH UR STRIP.AUTO: 5.5 [PH]
PHOSPHATE SERPL-MCNC: 5.8 MG/DL (ref 2.5–4.9)
PLATELET # BLD AUTO: 236 X10*3/UL (ref 150–450)
POTASSIUM SERPL-SCNC: 4.6 MMOL/L (ref 3.5–5.3)
PROT UR STRIP.AUTO-MCNC: NEGATIVE MG/DL
RBC # BLD AUTO: 3.88 X10*6/UL (ref 4.5–5.9)
RBC # UR STRIP.AUTO: NEGATIVE /UL
SODIUM SERPL-SCNC: 138 MMOL/L (ref 136–145)
SP GR UR STRIP.AUTO: 1.01
UROBILINOGEN UR STRIP.AUTO-MCNC: NORMAL MG/DL
WBC # BLD AUTO: 5 X10*3/UL (ref 4.4–11.3)

## 2024-03-02 PROCEDURE — 2500000001 HC RX 250 WO HCPCS SELF ADMINISTERED DRUGS (ALT 637 FOR MEDICARE OP)

## 2024-03-02 PROCEDURE — 2500000002 HC RX 250 W HCPCS SELF ADMINISTERED DRUGS (ALT 637 FOR MEDICARE OP, ALT 636 FOR OP/ED)

## 2024-03-02 PROCEDURE — 84100 ASSAY OF PHOSPHORUS: CPT

## 2024-03-02 PROCEDURE — 2500000001 HC RX 250 WO HCPCS SELF ADMINISTERED DRUGS (ALT 637 FOR MEDICARE OP): Performed by: STUDENT IN AN ORGANIZED HEALTH CARE EDUCATION/TRAINING PROGRAM

## 2024-03-02 PROCEDURE — 85025 COMPLETE CBC W/AUTO DIFF WBC: CPT

## 2024-03-02 PROCEDURE — 99233 SBSQ HOSP IP/OBS HIGH 50: CPT

## 2024-03-02 PROCEDURE — S0109 METHADONE ORAL 5MG: HCPCS

## 2024-03-02 PROCEDURE — 36415 COLL VENOUS BLD VENIPUNCTURE: CPT

## 2024-03-02 PROCEDURE — 83735 ASSAY OF MAGNESIUM: CPT

## 2024-03-02 PROCEDURE — 1200000002 HC GENERAL ROOM WITH TELEMETRY DAILY

## 2024-03-02 PROCEDURE — 2500000004 HC RX 250 GENERAL PHARMACY W/ HCPCS (ALT 636 FOR OP/ED): Performed by: STUDENT IN AN ORGANIZED HEALTH CARE EDUCATION/TRAINING PROGRAM

## 2024-03-02 RX ORDER — LIDOCAINE 560 MG/1
2 PATCH PERCUTANEOUS; TOPICAL; TRANSDERMAL ONCE
Status: DISCONTINUED | OUTPATIENT
Start: 2024-03-03 | End: 2024-03-04 | Stop reason: HOSPADM

## 2024-03-02 RX ORDER — FUROSEMIDE 10 MG/ML
80 INJECTION INTRAMUSCULAR; INTRAVENOUS ONCE
Status: COMPLETED | OUTPATIENT
Start: 2024-03-02 | End: 2024-03-02

## 2024-03-02 RX ORDER — METOLAZONE 5 MG/1
5 TABLET ORAL DAILY
Status: DISCONTINUED | OUTPATIENT
Start: 2024-03-02 | End: 2024-03-04 | Stop reason: HOSPADM

## 2024-03-02 RX ADMIN — METHADONE HYDROCHLORIDE 20 MG: 10 TABLET ORAL at 20:04

## 2024-03-02 RX ADMIN — ACETAMINOPHEN 975 MG: 325 TABLET ORAL at 20:05

## 2024-03-02 RX ADMIN — ASPIRIN 81 MG: 81 TABLET, COATED ORAL at 08:48

## 2024-03-02 RX ADMIN — QUETIAPINE FUMARATE 12.5 MG: 25 TABLET ORAL at 20:05

## 2024-03-02 RX ADMIN — ACETAMINOPHEN 975 MG: 325 TABLET ORAL at 04:54

## 2024-03-02 RX ADMIN — SPIRONOLACTONE 25 MG: 25 TABLET, FILM COATED ORAL at 08:48

## 2024-03-02 RX ADMIN — OXYCODONE HYDROCHLORIDE AND ACETAMINOPHEN 1000 MG: 500 TABLET ORAL at 08:49

## 2024-03-02 RX ADMIN — TAMSULOSIN HYDROCHLORIDE 0.4 MG: 0.4 CAPSULE ORAL at 08:49

## 2024-03-02 RX ADMIN — FUROSEMIDE 80 MG: 10 INJECTION, SOLUTION INTRAVENOUS at 11:07

## 2024-03-02 RX ADMIN — METHADONE HYDROCHLORIDE 20 MG: 10 TABLET ORAL at 07:16

## 2024-03-02 RX ADMIN — METOPROLOL SUCCINATE 25 MG: 25 TABLET, EXTENDED RELEASE ORAL at 08:50

## 2024-03-02 RX ADMIN — DICLOFENAC SODIUM TOPICAL GEL, 1% 4 G: 10 GEL TOPICAL at 17:26

## 2024-03-02 RX ADMIN — ATORVASTATIN CALCIUM 40 MG: 40 TABLET, FILM COATED ORAL at 20:04

## 2024-03-02 RX ADMIN — LISINOPRIL 5 MG: 5 TABLET ORAL at 08:49

## 2024-03-02 RX ADMIN — PANTOPRAZOLE SODIUM 20 MG: 20 TABLET, DELAYED RELEASE ORAL at 07:16

## 2024-03-02 RX ADMIN — FERROUS SULFATE TAB 325 MG (65 MG ELEMENTAL FE) 1 TABLET: 325 (65 FE) TAB at 08:48

## 2024-03-02 RX ADMIN — RIVAROXABAN 20 MG: 20 TABLET, FILM COATED ORAL at 17:22

## 2024-03-02 RX ADMIN — QUETIAPINE FUMARATE 12.5 MG: 25 TABLET ORAL at 08:49

## 2024-03-02 RX ADMIN — ACETAMINOPHEN 975 MG: 325 TABLET ORAL at 11:07

## 2024-03-02 RX ADMIN — METOLAZONE 5 MG: 5 TABLET ORAL at 11:12

## 2024-03-02 ASSESSMENT — COGNITIVE AND FUNCTIONAL STATUS - GENERAL
MOBILITY SCORE: 24
DAILY ACTIVITIY SCORE: 24

## 2024-03-02 ASSESSMENT — PAIN - FUNCTIONAL ASSESSMENT
PAIN_FUNCTIONAL_ASSESSMENT: 0-10
PAIN_FUNCTIONAL_ASSESSMENT: 0-10
PAIN_FUNCTIONAL_ASSESSMENT: CRIES (CRYING REQUIRES OXYGEN INCREASED VITAL SIGNS EXPRESSION SLEEP)
PAIN_FUNCTIONAL_ASSESSMENT: 0-10

## 2024-03-02 ASSESSMENT — PAIN SCALES - GENERAL
PAINLEVEL_OUTOF10: 6
PAINLEVEL_OUTOF10: 8
PAINLEVEL_OUTOF10: 0 - NO PAIN

## 2024-03-02 ASSESSMENT — PAIN DESCRIPTION - ORIENTATION
ORIENTATION: LEFT;RIGHT
ORIENTATION: RIGHT;LOWER

## 2024-03-02 ASSESSMENT — PAIN DESCRIPTION - LOCATION
LOCATION: ABDOMEN
LOCATION: BACK

## 2024-03-02 NOTE — PROGRESS NOTES
"Marco Antonio Montalvo is a 73 y.o. male on day 8 of admission presenting with Acute on chronic systolic (congestive) heart failure (CMS/HCC).    Subjective   Patient denied RHC yesterday. This AM states he feels his breathing is less labored and he is able to take bigger breaths, but the swelling in his legs has only minimally improved. He continues to endorse left flank pain.       Objective   Physical Exam  Constitutional: NAD  HEENT: EOMI, no scleral icterus, MMM  CV: regular, no m/r/g  Pulm: diminished in bilateral bases, no increased WOB, no longer short of breath while speaking  GI: Soft, nontender, non-distended  MSK: tender over left flank  Extremities: significant BL LE pitting edema, firm  Skin: warm  Neuro: grossly alert and oriented    Last Recorded Vitals  Blood pressure 96/52, pulse 61, temperature 36.9 °C (98.4 °F), resp. rate 18, height 1.778 m (5' 10\"), weight 97.9 kg (215 lb 13.3 oz), SpO2 95 %.  Intake/Output last 3 Shifts:  I/O last 3 completed shifts:  In: 840 (8.6 mL/kg) [P.O.:840]  Out: 2550 (26 mL/kg) [Urine:2550 (0.7 mL/kg/hr)]  Weight: 97.9 kg     Scheduled medications  ascorbic acid, 1,000 mg, oral, Daily  aspirin, 81 mg, oral, Daily  atorvastatin, 40 mg, oral, Daily  ferrous sulfate (325 mg ferrous sulfate), 65 mg of iron, oral, Daily with breakfast  lisinopril, 5 mg, oral, Daily  methadone, 20 mg, oral, q12h  metOLazone, 5 mg, oral, Daily  metoprolol succinate XL, 25 mg, oral, Daily  pantoprazole, 20 mg, oral, Daily before breakfast  QUEtiapine, 12.5 mg, oral, BID  rivaroxaban, 20 mg, oral, Daily with evening meal  spironolactone, 25 mg, oral, Daily  tamsulosin, 0.4 mg, oral, Daily    Continuous medications     PRN medications  PRN medications: acetaminophen, albuterol, diclofenac sodium, nicotine    Results for orders placed or performed during the hospital encounter of 02/23/24 (from the past 24 hour(s))   Renal Function Panel   Result Value Ref Range    Glucose 120 (H) 74 - 99 mg/dL    " Sodium 134 (L) 136 - 145 mmol/L    Potassium 5.0 3.5 - 5.3 mmol/L    Chloride 99 98 - 107 mmol/L    Bicarbonate 23 21 - 32 mmol/L    Anion Gap 17 10 - 20 mmol/L    Urea Nitrogen 45 (H) 6 - 23 mg/dL    Creatinine 1.82 (H) 0.50 - 1.30 mg/dL    eGFR 39 (L) >60 mL/min/1.73m*2    Calcium 8.3 (L) 8.6 - 10.6 mg/dL    Phosphorus 5.5 (H) 2.5 - 4.9 mg/dL    Albumin 3.8 3.4 - 5.0 g/dL   Magnesium   Result Value Ref Range    Magnesium 2.19 1.60 - 2.40 mg/dL   Urinalysis with Reflex Culture and Microscopic   Result Value Ref Range    Color, Urine Light-Yellow Light-Yellow, Yellow, Dark-Yellow    Appearance, Urine Clear Clear    Specific Gravity, Urine 1.015 1.005 - 1.035    pH, Urine 5.5 5.0, 5.5, 6.0, 6.5, 7.0, 7.5, 8.0    Protein, Urine NEGATIVE NEGATIVE, 10 (TRACE), 20 (TRACE) mg/dL    Glucose, Urine Normal Normal mg/dL    Blood, Urine NEGATIVE NEGATIVE    Ketones, Urine NEGATIVE NEGATIVE mg/dL    Bilirubin, Urine NEGATIVE NEGATIVE    Urobilinogen, Urine Normal Normal mg/dL    Nitrite, Urine NEGATIVE NEGATIVE    Leukocyte Esterase, Urine NEGATIVE NEGATIVE   Extra Urine Gray Tube   Result Value Ref Range    Extra Tube Hold for add-ons.    Renal function panel   Result Value Ref Range    Glucose 116 (H) 74 - 99 mg/dL    Sodium 138 136 - 145 mmol/L    Potassium 4.6 3.5 - 5.3 mmol/L    Chloride 101 98 - 107 mmol/L    Bicarbonate 25 21 - 32 mmol/L    Anion Gap 17 10 - 20 mmol/L    Urea Nitrogen 55 (H) 6 - 23 mg/dL    Creatinine 1.72 (H) 0.50 - 1.30 mg/dL    eGFR 41 (L) >60 mL/min/1.73m*2    Calcium 8.5 (L) 8.6 - 10.6 mg/dL    Phosphorus 5.8 (H) 2.5 - 4.9 mg/dL    Albumin 3.9 3.4 - 5.0 g/dL   Magnesium   Result Value Ref Range    Magnesium 2.06 1.60 - 2.40 mg/dL   CBC and Auto Differential   Result Value Ref Range    WBC 5.0 4.4 - 11.3 x10*3/uL    nRBC 0.0 0.0 - 0.0 /100 WBCs    RBC 3.88 (L) 4.50 - 5.90 x10*6/uL    Hemoglobin 9.8 (L) 13.5 - 17.5 g/dL    Hematocrit 31.8 (L) 41.0 - 52.0 %    MCV 82 80 - 100 fL    MCH 25.3 (L) 26.0 -  34.0 pg    MCHC 30.8 (L) 32.0 - 36.0 g/dL    RDW 16.7 (H) 11.5 - 14.5 %    Platelets 236 150 - 450 x10*3/uL    Neutrophils % 58.3 40.0 - 80.0 %    Immature Granulocytes %, Automated 0.2 0.0 - 0.9 %    Lymphocytes % 25.9 13.0 - 44.0 %    Monocytes % 11.4 2.0 - 10.0 %    Eosinophils % 3.6 0.0 - 6.0 %    Basophils % 0.6 0.0 - 2.0 %    Neutrophils Absolute 2.92 1.60 - 5.50 x10*3/uL    Immature Granulocytes Absolute, Automated 0.01 0.00 - 0.50 x10*3/uL    Lymphocytes Absolute 1.30 0.80 - 3.00 x10*3/uL    Monocytes Absolute 0.57 0.05 - 0.80 x10*3/uL    Eosinophils Absolute 0.18 0.00 - 0.40 x10*3/uL    Basophils Absolute 0.03 0.00 - 0.10 x10*3/uL       Assessment/Plan   Principal Problem:    Acute on chronic systolic (congestive) heart failure (CMS/HCC)  Active Problems:    Acute on chronic systolic congestive heart failure (CMS/[facility])    ASSESSMENT:  MarcoA ntonio Montalvo is a 73 y.o. male PMH HFrEF (EF 48% in 11/23), CAD s/p PCI in 2001 and CABG x3 (LIMA-LAD, SVG-PDA, SVG-D2) 11/2023, A-fib RVR on xaretlo, cholelithiasis, nephrolithiasis c/b pyelonephritis, PTSD, depression, chronic pain and GERD who presents today with several days of worsening shortness of breath and abdominal pain. Worsening HUNTER, LE edema and orthopnea in setting of elevated BNP and vascular congestion on imaging. Will continue to diurese for suspected acute decompensation heart failure iso medication noncompliance.     Updates:  -UOP 2L last 24h, will give metolazone 5 mg and lasix 80mg IV again this AM   -endorsing persistent flank pain, repeat UA bland and renal UA pending  -patient will need PCP and HF follow[facility]up at  [facility]     #Acute on Chronic Systolic HF (EF 48%, 11/23) s/t medication noncompliance  #CAD s/p CABG (11/23)  :: TTE 11/23 w/ EF 48% post-CABG  :: dry weight ~190lbs, admission weight 210 lbs.  :: trop negative, BNP elevated at 219 (2000s at OSH)  :: supposed to be on torsemide 20 mg daily at home,  metoprolol succinate ER 25 mg daily, lisinopril 5 daily  :: CXR w/ cardiomegaly and vascular congestion  :: CTPE w/ did note dilated main pulmonary artery, mild left ventricular and left atrial enlargement, a multiple prominent to mildly enlarged mediastinal lymph nodes, and patchy mosaic attenuation within bilateral lungs, which may represent a component of small vessel/small airway disease.  :: PA Systolic pressure 33 in 11/23 on PA cath, though in setting of diuresis  :: PFTs 11/23 with normal FEV1 and FVC but reduced ratio suggestive of mild obstructive pattern or normal variant. Significant bronchodilator response. Normal DLCO.  :: TTE w/ reduced EF 35-40% with global hypokinesis from 48% 11/23, likely s/t medication noncompliance  Plan  -Preload: metolazone 5mg, lasix 80 IV. Switch to PO torsemide at DC.  -Afterload: lisinopril 5 mg  -NMB: spironolactone 25 mg, metoprolol succinate 25 mg  -SGLT2i: limited by affordability  -continue home atorvastatin 40 mg  -RHC cancelled due to patient refusing for second consecutive day, will medically manage  -strict I&O's, daily weights     #Nonoliguric ROSALBA  :: Cr 0.91 on admission -> peak 1.82  :: s/t diuresis  -ctm     #A fib w/ bradycardia  :: home meds- metoprolol succinate, xarelto 20 mg daily. Has not been taking.  -restarted metoprolol, continue xarelto  -CTM, if concern for sxs bradycardia may need to discuss PPM    #Abdominal Pain  #Left Flank pain  #Non-obstructing nephrolithiasis  :: CT A/P No evidence of acute pathology within the abdomen and pelvis. Incidental findings of atrophy of the left hepatic lobe, chronic pancreatitis, and nonobstructing bilateral nephrolithiasis as described above  :: UA bland, LFTs wnl  :: suspect may be s/t constipation as patient states he not regular. Low suspicion for acute pancreatitis flare. Could also be component acid reflux  vs PUD as improves with PPI  -continue PPI  -needs outpatient endoscopy  -renal ultrasound ordered      #Depression  #PTSD  #Social  :: not taking home zoloft 100 mg daily  -psych consulted, now signed off. Impression was PTSD and Group B personality traits  -started seroquel 12.5 mg BID, stopped mirtazapine given lasting sedation   -would benefit from home health care     #Thoracic Aortic Aneurysm  :: CT PE w/ aneurysmal dilatation ascending thoracic aorta measuring up to 4.5 cm  -follow-up CT/MR angiogram of the chest in 6-12 months is recommended to ensure  stability.     #SHON  :: hgb 9.3, hgb 8-9 since 11/17/23 w/ acute drop from ~14. At that time attributed to blood loss from procedure. No reported melena or BRBPR.   :: Iron 27, UIBC 394, TIBC 421, % sat 6, ferritin 6  -start iron supplement  -daily cbc     #Chronic pain  -continue methadone 20 mg q12     #tobacco use disorder  -nicotine patches     F: diuresing  E: K>4, Mg>2  N: Na restricted  A: PIV  GI ppx: home PPI  DVT ppx: home xarelto  DESK: Daughter Kelsey [phone]  Code Status: Full code    Brenna Stanton MD

## 2024-03-02 NOTE — CARE PLAN
The patient's goals for the shift include  to get pain under 4- not met    The clinical goals for the shift include Patient will remain HDS throughout shift    Over the shift, the patient did not make progress toward the following goals. Barriers to progression include pt noncompliance and anxiety.  Recommendations to address these barriers include speaking about plan to patient, address pain management and treating anxiety  Problem: Heart Failure  Goal: Improved gas exchange this shift  Outcome: Progressing  Goal: Improved urinary output this shift  Outcome: Progressing  Goal: Reduction in peripheral edema within 24 hours  Outcome: Progressing  Goal: Report improvement of dyspnea/breathlessness this shift  Outcome: Progressing  Goal: Weight from fluid excess reduced over 2-3 days, then stabilize  Outcome: Progressing  Goal: Increase self care and/or family involvement in 24 hours  Outcome: Progressing     Problem: Pain  Goal: Takes deep breaths with improved pain control throughout the shift  Outcome: Progressing  Goal: Turns in bed with improved pain control throughout the shift  Outcome: Progressing  Goal: Walks with improved pain control throughout the shift  Outcome: Progressing  Goal: Performs ADL's with improved pain control throughout shift  Outcome: Progressing  Goal: Participates in PT with improved pain control throughout the shift  Outcome: Progressing  Goal: Free from opioid side effects throughout the shift  Outcome: Progressing  Goal: Free from acute confusion related to pain meds throughout the shift  Outcome: Progressing     Problem: Pain - Adult  Goal: Verbalizes/displays adequate comfort level or baseline comfort level  Outcome: Progressing     Problem: Safety - Adult  Goal: Free from fall injury  Outcome: Progressing     Problem: Discharge Planning  Goal: Discharge to home or other facility with appropriate resources  Outcome: Progressing     Problem: Chronic Conditions and Co-morbidities  Goal:  Patient's chronic conditions and co-morbidity symptoms are monitored and maintained or improved  Outcome: Progressing

## 2024-03-03 ENCOUNTER — APPOINTMENT (OUTPATIENT)
Dept: RADIOLOGY | Facility: HOSPITAL | Age: 74
DRG: 292 | End: 2024-03-03
Payer: MEDICARE

## 2024-03-03 LAB
ALBUMIN SERPL BCP-MCNC: 3.8 G/DL (ref 3.4–5)
ANION GAP SERPL CALC-SCNC: 15 MMOL/L (ref 10–20)
BUN SERPL-MCNC: 68 MG/DL (ref 6–23)
CALCIUM SERPL-MCNC: 8.5 MG/DL (ref 8.6–10.6)
CHLORIDE SERPL-SCNC: 101 MMOL/L (ref 98–107)
CO2 SERPL-SCNC: 26 MMOL/L (ref 21–32)
CREAT SERPL-MCNC: 2.11 MG/DL (ref 0.5–1.3)
EGFRCR SERPLBLD CKD-EPI 2021: 32 ML/MIN/1.73M*2
GLUCOSE SERPL-MCNC: 97 MG/DL (ref 74–99)
MAGNESIUM SERPL-MCNC: 2.03 MG/DL (ref 1.6–2.4)
PHOSPHATE SERPL-MCNC: 6.6 MG/DL (ref 2.5–4.9)
POTASSIUM SERPL-SCNC: 4.9 MMOL/L (ref 3.5–5.3)
SODIUM SERPL-SCNC: 137 MMOL/L (ref 136–145)

## 2024-03-03 PROCEDURE — 1200000002 HC GENERAL ROOM WITH TELEMETRY DAILY

## 2024-03-03 PROCEDURE — 99233 SBSQ HOSP IP/OBS HIGH 50: CPT

## 2024-03-03 PROCEDURE — 76770 US EXAM ABDO BACK WALL COMP: CPT

## 2024-03-03 PROCEDURE — 2500000002 HC RX 250 W HCPCS SELF ADMINISTERED DRUGS (ALT 637 FOR MEDICARE OP, ALT 636 FOR OP/ED)

## 2024-03-03 PROCEDURE — 36415 COLL VENOUS BLD VENIPUNCTURE: CPT

## 2024-03-03 PROCEDURE — 2500000001 HC RX 250 WO HCPCS SELF ADMINISTERED DRUGS (ALT 637 FOR MEDICARE OP)

## 2024-03-03 PROCEDURE — S0109 METHADONE ORAL 5MG: HCPCS

## 2024-03-03 PROCEDURE — 83735 ASSAY OF MAGNESIUM: CPT

## 2024-03-03 PROCEDURE — 80069 RENAL FUNCTION PANEL: CPT

## 2024-03-03 PROCEDURE — 2500000005 HC RX 250 GENERAL PHARMACY W/O HCPCS

## 2024-03-03 PROCEDURE — 76770 US EXAM ABDO BACK WALL COMP: CPT | Performed by: RADIOLOGY

## 2024-03-03 PROCEDURE — 2500000001 HC RX 250 WO HCPCS SELF ADMINISTERED DRUGS (ALT 637 FOR MEDICARE OP): Performed by: STUDENT IN AN ORGANIZED HEALTH CARE EDUCATION/TRAINING PROGRAM

## 2024-03-03 RX ORDER — ALUMINUM HYDROXIDE, MAGNESIUM HYDROXIDE, AND SIMETHICONE 1200; 120; 1200 MG/30ML; MG/30ML; MG/30ML
10 SUSPENSION ORAL 4 TIMES DAILY PRN
Status: DISCONTINUED | OUTPATIENT
Start: 2024-03-03 | End: 2024-03-04 | Stop reason: HOSPADM

## 2024-03-03 RX ADMIN — ATORVASTATIN CALCIUM 40 MG: 40 TABLET, FILM COATED ORAL at 20:32

## 2024-03-03 RX ADMIN — METHADONE HYDROCHLORIDE 20 MG: 10 TABLET ORAL at 08:06

## 2024-03-03 RX ADMIN — ACETAMINOPHEN 975 MG: 325 TABLET ORAL at 04:00

## 2024-03-03 RX ADMIN — PANTOPRAZOLE SODIUM 20 MG: 20 TABLET, DELAYED RELEASE ORAL at 08:06

## 2024-03-03 RX ADMIN — OXYCODONE HYDROCHLORIDE AND ACETAMINOPHEN 1000 MG: 500 TABLET ORAL at 08:06

## 2024-03-03 RX ADMIN — LISINOPRIL 5 MG: 5 TABLET ORAL at 08:06

## 2024-03-03 RX ADMIN — TAMSULOSIN HYDROCHLORIDE 0.4 MG: 0.4 CAPSULE ORAL at 08:06

## 2024-03-03 RX ADMIN — METOLAZONE 5 MG: 5 TABLET ORAL at 08:06

## 2024-03-03 RX ADMIN — FERROUS SULFATE TAB 325 MG (65 MG ELEMENTAL FE) 1 TABLET: 325 (65 FE) TAB at 08:06

## 2024-03-03 RX ADMIN — METHADONE HYDROCHLORIDE 20 MG: 10 TABLET ORAL at 20:31

## 2024-03-03 RX ADMIN — QUETIAPINE FUMARATE 12.5 MG: 25 TABLET ORAL at 08:06

## 2024-03-03 RX ADMIN — RIVAROXABAN 20 MG: 20 TABLET, FILM COATED ORAL at 17:04

## 2024-03-03 RX ADMIN — ASPIRIN 81 MG: 81 TABLET, COATED ORAL at 08:06

## 2024-03-03 ASSESSMENT — COGNITIVE AND FUNCTIONAL STATUS - GENERAL
MOVING FROM LYING ON BACK TO SITTING ON SIDE OF FLAT BED WITH BEDRAILS: A LITTLE
STANDING UP FROM CHAIR USING ARMS: A LITTLE
MOBILITY SCORE: 18
MOVING TO AND FROM BED TO CHAIR: A LITTLE
DAILY ACTIVITIY SCORE: 24
TURNING FROM BACK TO SIDE WHILE IN FLAT BAD: A LITTLE
WALKING IN HOSPITAL ROOM: A LITTLE
CLIMB 3 TO 5 STEPS WITH RAILING: A LITTLE

## 2024-03-03 ASSESSMENT — PAIN SCALES - GENERAL
PAINLEVEL_OUTOF10: 7
PAINLEVEL_OUTOF10: 7
PAINLEVEL_OUTOF10: 0 - NO PAIN

## 2024-03-03 ASSESSMENT — PAIN SCALES - WONG BAKER: WONGBAKER_NUMERICALRESPONSE: NO HURT

## 2024-03-03 ASSESSMENT — PAIN DESCRIPTION - LOCATION: LOCATION: BACK

## 2024-03-03 NOTE — PROGRESS NOTES
"Marco Antonio Montalvo is a 73 y.o. male on day 9 of admission presenting with Acute on chronic systolic (congestive) heart failure (CMS/HCC).    Subjective   NAEO. Pt reports he is feeling ok today but complains of gas and abdominal pain. Denies any constipation, last BM yesterday.  Nurse alerted team that pt seemed lethargic and less responsive. Per pt he is just tried. He was HDS at the time, would not allow us to check BG. Agreed to wear his compression socks.  His nurse reports she held Spironolactone and Metoprolol for HR in the 40-50s.        Objective   Physical Exam  Constitutional: NAD  HEENT: EOMI, no scleral icterus, MMM  CV: regular, no m/r/g  Pulm: diminished in bilateral bases, no increased WOB, no longer short of breath while speaking  GI: Soft, nontender, non-distended  MSK: tender over left flank  Extremities: significant BL LE pitting edema, firm- painful to touch  Skin: warm  Neuro: grossly alert and oriented    Last Recorded Vitals  Blood pressure 96/57, pulse 56, temperature 36.5 °C (97.7 °F), temperature source Tympanic, resp. rate 18, height 1.778 m (5' 10\"), weight 98.6 kg (217 lb 6 oz), SpO2 95 %.  Intake/Output last 3 Shifts:  I/O last 3 completed shifts:  In: 960 (9.7 mL/kg) [P.O.:960]  Out: 2775 (28.1 mL/kg) [Urine:2775 (0.8 mL/kg/hr)]  Weight: 98.6 kg     Scheduled medications  ascorbic acid, 1,000 mg, oral, Daily  aspirin, 81 mg, oral, Daily  atorvastatin, 40 mg, oral, Daily  ferrous sulfate (325 mg ferrous sulfate), 65 mg of iron, oral, Daily with breakfast  lidocaine, 2 patch, transdermal, Once  lisinopril, 5 mg, oral, Daily  methadone, 20 mg, oral, q12h  metOLazone, 5 mg, oral, Daily  metoprolol succinate XL, 25 mg, oral, Daily  pantoprazole, 20 mg, oral, Daily before breakfast  QUEtiapine, 12.5 mg, oral, BID  rivaroxaban, 20 mg, oral, Daily with evening meal  spironolactone, 25 mg, oral, Daily  tamsulosin, 0.4 mg, oral, Daily    Continuous medications     PRN medications  PRN " medications: acetaminophen, albuterol, diclofenac sodium, nicotine    Results for orders placed or performed during the hospital encounter of 02/23/24 (from the past 24 hour(s))   Magnesium   Result Value Ref Range    Magnesium 2.08 1.60 - 2.40 mg/dL       Assessment/Plan   Principal Problem:    Acute on chronic systolic (congestive) heart failure (CMS/HCC)  Active Problems:    Acute on chronic systolic congestive heart failure (CMS/HCC)    ASSESSMENT:  Marco Antonio Montalvo is a 73 y.o. male PMH HFrEF (EF 48% in 11/23), CAD s/p PCI in 2001 and CABG x3 (LIMA-LAD, SVG-PDA, SVG-D2) 11/2023, A-fib RVR on xaretlo, cholelithiasis, nephrolithiasis c/b pyelonephritis, PTSD, depression, chronic pain and GERD who presents today with several days of worsening shortness of breath and abdominal pain. Worsening HUNTER, LE edema and orthopnea in setting of elevated BNP and vascular congestion on imaging. Will continue to diurese for suspected acute decompensation heart failure iso medication noncompliance.     Updates:  -UOP 2.2L last 24h  - Hold diuresis today given worsening ROSALBA and Cr 2.11  -Simethicone for gas  - Hold seroquel tonight I/s/o pt being less responsive and fatigued  -patient will need PCP and HF follow[facility]up [facility]     #Acute on Chronic Systolic HF (EF 48%, 11/23) s/t medication noncompliance  #CAD s/p CABG (11/23)  :: TTE 11/23 w/ EF 48% post-CABG  :: dry weight ~190lbs, admission weight 210 lbs.  :: trop negative, BNP elevated at 219 (2000s at OSH)  :: supposed to be on torsemide 20 mg daily at home, metoprolol succinate ER 25 mg daily, lisinopril 5 daily  :: CXR w/ cardiomegaly and vascular congestion  :: CTPE w/ did note dilated main pulmonary artery, mild left ventricular and left atrial enlargement, a multiple prominent to mildly enlarged mediastinal lymph nodes, and patchy mosaic attenuation within bilateral lungs, which may represent a component of small vessel/small airway  disease.  :: PA Systolic pressure 33 in 11/23 on PA cath, though in setting of diuresis  :: PFTs 11/23 with normal FEV1 and FVC but reduced ratio suggestive of mild obstructive pattern or normal variant. Significant bronchodilator response. Normal DLCO.  :: TTE w/ reduced EF 35-40% with global hypokinesis from 48% 11/23, likely s/t medication noncompliance  Plan  -Preload: - holding today I/s/o worsening ROSALBA - metolazone 5mg, lasix 80 IV. Switch to PO torsemide at DC.  -Afterload: lisinopril 5 mg  -NMB: spironolactone 25 mg, metoprolol succinate 25 mg  -SGLT2i: limited by affordability  -continue home atorvastatin 40 mg  -RHC cancelled due to patient refusing for second consecutive day, will medically manage  -strict I&O's, daily weights     #Nonoliguric ROSALBA  :: Cr 0.91 on admission -> today 2.11  :: will hold diuresis today  -ctm     #A fib w/ bradycardia  :: home meds- metoprolol succinate, xarelto 20 mg daily. Has not been taking.  -restarted metoprolol, continue xarelto  -CTM, if concern for sxs bradycardia may need to discuss PPM    #Abdominal Pain  #Left Flank pain  #Non-obstructing nephrolithiasis  :: CT A/P No evidence of acute pathology within the abdomen and pelvis. Incidental findings of atrophy of the left hepatic lobe, chronic pancreatitis, and nonobstructing bilateral nephrolithiasis as described above  :: UA bland, LFTs wnl  :: suspect may be s/t constipation as patient states he not regular. Low suspicion for acute pancreatitis flare. Could also be component acid reflux  vs PUD as improves with PPI  -continue PPI  -needs outpatient endoscopy  -renal ultrasound ordered- no hydro  -Simethicone for complaints of gas     #Depression  #PTSD  #Social  :: not taking home zoloft 100 mg daily  -psych consulted, now signed off. Impression was PTSD and Group B personality traits  -started seroquel 12.5 mg BID, stopped mirtazapine given lasting sedation   - hold Seroquel tonight due to pt being sedated and less  responsive  -would benefit from home health care     #Thoracic Aortic Aneurysm  :: CT PE w/ aneurysmal dilatation ascending thoracic aorta measuring up to 4.5 cm  -follow-up CT/MR angiogram of the chest in 6-12 months is recommended to ensure  stability.     #SHON  :: hgb 9.3, hgb 8-9 since 11/17/23 w/ acute drop from ~14. At that time attributed to blood loss from procedure. No reported melena or BRBPR.   :: Iron 27, UIBC 394, TIBC 421, % sat 6, ferritin 6  -start iron supplement  -daily cbc     #Chronic pain  -continue methadone 20 mg q12     #tobacco use disorder  -nicotine patches     F: diuresing  E: K>4, Mg>2  N: Na restricted  A: PIV  GI ppx: home PPI  DVT ppx: home xarelto  YANG: Daughter Kelsey [phone]  Code Status: Full code    Cheyanne Horan,

## 2024-03-04 VITALS
DIASTOLIC BLOOD PRESSURE: 66 MMHG | RESPIRATION RATE: 16 BRPM | TEMPERATURE: 97.9 F | WEIGHT: 217.37 LBS | SYSTOLIC BLOOD PRESSURE: 112 MMHG | HEART RATE: 58 BPM | HEIGHT: 70 IN | BODY MASS INDEX: 31.12 KG/M2 | OXYGEN SATURATION: 94 %

## 2024-03-04 LAB
ALBUMIN SERPL BCP-MCNC: 3.9 G/DL (ref 3.4–5)
ANION GAP SERPL CALC-SCNC: 13 MMOL/L (ref 10–20)
BUN SERPL-MCNC: 58 MG/DL (ref 6–23)
CALCIUM SERPL-MCNC: 8.5 MG/DL (ref 8.6–10.6)
CHLORIDE SERPL-SCNC: 104 MMOL/L (ref 98–107)
CO2 SERPL-SCNC: 23 MMOL/L (ref 21–32)
CREAT SERPL-MCNC: 1.59 MG/DL (ref 0.5–1.3)
EGFRCR SERPLBLD CKD-EPI 2021: 46 ML/MIN/1.73M*2
ERYTHROCYTE [DISTWIDTH] IN BLOOD BY AUTOMATED COUNT: 16.5 % (ref 11.5–14.5)
GLUCOSE SERPL-MCNC: 120 MG/DL (ref 74–99)
HCT VFR BLD AUTO: 33.2 % (ref 41–52)
HGB BLD-MCNC: 9.6 G/DL (ref 13.5–17.5)
MAGNESIUM SERPL-MCNC: 2 MG/DL (ref 1.6–2.4)
MCH RBC QN AUTO: 24.7 PG (ref 26–34)
MCHC RBC AUTO-ENTMCNC: 28.9 G/DL (ref 32–36)
MCV RBC AUTO: 85 FL (ref 80–100)
NRBC BLD-RTO: 0 /100 WBCS (ref 0–0)
PHOSPHATE SERPL-MCNC: 4.1 MG/DL (ref 2.5–4.9)
PLATELET # BLD AUTO: 218 X10*3/UL (ref 150–450)
POTASSIUM SERPL-SCNC: 4.8 MMOL/L (ref 3.5–5.3)
RBC # BLD AUTO: 3.89 X10*6/UL (ref 4.5–5.9)
SODIUM SERPL-SCNC: 135 MMOL/L (ref 136–145)
WBC # BLD AUTO: 4.8 X10*3/UL (ref 4.4–11.3)

## 2024-03-04 PROCEDURE — 85027 COMPLETE CBC AUTOMATED: CPT

## 2024-03-04 PROCEDURE — 2500000001 HC RX 250 WO HCPCS SELF ADMINISTERED DRUGS (ALT 637 FOR MEDICARE OP)

## 2024-03-04 PROCEDURE — 2500000002 HC RX 250 W HCPCS SELF ADMINISTERED DRUGS (ALT 637 FOR MEDICARE OP, ALT 636 FOR OP/ED): Mod: MUE

## 2024-03-04 PROCEDURE — 99238 HOSP IP/OBS DSCHRG MGMT 30/<: CPT | Performed by: INTERNAL MEDICINE

## 2024-03-04 PROCEDURE — S0109 METHADONE ORAL 5MG: HCPCS

## 2024-03-04 PROCEDURE — 80069 RENAL FUNCTION PANEL: CPT

## 2024-03-04 PROCEDURE — 2500000002 HC RX 250 W HCPCS SELF ADMINISTERED DRUGS (ALT 637 FOR MEDICARE OP, ALT 636 FOR OP/ED)

## 2024-03-04 PROCEDURE — 36415 COLL VENOUS BLD VENIPUNCTURE: CPT

## 2024-03-04 PROCEDURE — 83735 ASSAY OF MAGNESIUM: CPT

## 2024-03-04 RX ORDER — SPIRONOLACTONE 25 MG/1
25 TABLET ORAL DAILY
Qty: 30 TABLET | Refills: 0 | Status: SHIPPED | OUTPATIENT
Start: 2024-03-05 | End: 2024-03-04 | Stop reason: SDUPTHER

## 2024-03-04 RX ORDER — ALUMINUM HYDROXIDE, MAGNESIUM HYDROXIDE, AND SIMETHICONE 1200; 120; 1200 MG/30ML; MG/30ML; MG/30ML
10 SUSPENSION ORAL 4 TIMES DAILY PRN
Qty: 355 ML | Refills: 0 | Status: SHIPPED | OUTPATIENT
Start: 2024-03-04 | End: 2024-03-04 | Stop reason: SDUPTHER

## 2024-03-04 RX ORDER — DIPHENHYDRAMINE HCL 25 MG
25 CAPSULE ORAL ONCE
Status: COMPLETED | OUTPATIENT
Start: 2024-03-04 | End: 2024-03-04

## 2024-03-04 RX ORDER — TAMSULOSIN HYDROCHLORIDE 0.4 MG/1
0.4 CAPSULE ORAL DAILY
Qty: 30 CAPSULE | Refills: 0 | Status: SHIPPED | OUTPATIENT
Start: 2024-03-05 | End: 2024-03-04 | Stop reason: SDUPTHER

## 2024-03-04 RX ORDER — PANTOPRAZOLE SODIUM 20 MG/1
20 TABLET, DELAYED RELEASE ORAL
Qty: 30 TABLET | Refills: 0 | Status: SHIPPED | OUTPATIENT
Start: 2024-03-05 | End: 2024-04-04

## 2024-03-04 RX ORDER — ASPIRIN 81 MG/1
81 TABLET ORAL DAILY
Qty: 30 TABLET | Refills: 0 | Status: SHIPPED | OUTPATIENT
Start: 2024-03-04 | End: 2024-03-04 | Stop reason: SDUPTHER

## 2024-03-04 RX ORDER — SPIRONOLACTONE 25 MG/1
25 TABLET ORAL DAILY
Qty: 30 TABLET | Refills: 0 | Status: SHIPPED | OUTPATIENT
Start: 2024-03-05 | End: 2024-04-04

## 2024-03-04 RX ORDER — LISINOPRIL 5 MG/1
5 TABLET ORAL DAILY
Qty: 30 TABLET | Refills: 0 | Status: SHIPPED | OUTPATIENT
Start: 2024-03-04 | End: 2024-04-03

## 2024-03-04 RX ORDER — TIOTROPIUM BROMIDE INHALATION SPRAY 3.12 UG/1
2 SPRAY, METERED RESPIRATORY (INHALATION) DAILY
Qty: 8 G | Refills: 11 | Status: SHIPPED | OUTPATIENT
Start: 2024-03-04 | End: 2024-04-03

## 2024-03-04 RX ORDER — METOPROLOL SUCCINATE 25 MG/1
25 TABLET, EXTENDED RELEASE ORAL DAILY
Qty: 30 TABLET | Refills: 0 | Status: SHIPPED | OUTPATIENT
Start: 2024-03-04 | End: 2024-04-03

## 2024-03-04 RX ORDER — LIDOCAINE 560 MG/1
2 PATCH PERCUTANEOUS; TOPICAL; TRANSDERMAL ONCE
Qty: 10 PATCH | Refills: 0 | Status: SHIPPED | OUTPATIENT
Start: 2024-03-04 | End: 2024-03-04

## 2024-03-04 RX ORDER — FUROSEMIDE 40 MG/1
40 TABLET ORAL DAILY
Qty: 30 TABLET | Refills: 0 | Status: SHIPPED | OUTPATIENT
Start: 2024-03-04 | End: 2024-03-04 | Stop reason: HOSPADM

## 2024-03-04 RX ORDER — VIT C/E/ZN/COPPR/LUTEIN/ZEAXAN 250MG-90MG
25 CAPSULE ORAL DAILY
Qty: 30 CAPSULE | Refills: 0 | Status: SHIPPED | OUTPATIENT
Start: 2024-03-04 | End: 2024-04-03

## 2024-03-04 RX ORDER — TORSEMIDE 20 MG/1
20 TABLET ORAL DAILY
Qty: 30 TABLET | Refills: 0 | Status: SHIPPED | OUTPATIENT
Start: 2024-03-04 | End: 2024-04-03

## 2024-03-04 RX ORDER — PANTOPRAZOLE SODIUM 20 MG/1
20 TABLET, DELAYED RELEASE ORAL
Qty: 30 TABLET | Refills: 0 | Status: SHIPPED | OUTPATIENT
Start: 2024-03-05 | End: 2024-03-04 | Stop reason: SDUPTHER

## 2024-03-04 RX ORDER — FERROUS SULFATE 325(65) MG
65 TABLET ORAL
Qty: 30 TABLET | Refills: 0 | Status: SHIPPED | OUTPATIENT
Start: 2024-03-05 | End: 2024-03-04 | Stop reason: SDUPTHER

## 2024-03-04 RX ORDER — ALUMINUM HYDROXIDE, MAGNESIUM HYDROXIDE, AND SIMETHICONE 1200; 120; 1200 MG/30ML; MG/30ML; MG/30ML
10 SUSPENSION ORAL 4 TIMES DAILY PRN
Qty: 355 ML | Refills: 0 | Status: SHIPPED | OUTPATIENT
Start: 2024-03-04

## 2024-03-04 RX ORDER — FERROUS SULFATE, DRIED 160(50) MG
1 TABLET, EXTENDED RELEASE ORAL DAILY
Qty: 30 TABLET | Refills: 0 | Status: SHIPPED | OUTPATIENT
Start: 2024-03-04 | End: 2024-04-03

## 2024-03-04 RX ORDER — TAMSULOSIN HYDROCHLORIDE 0.4 MG/1
0.4 CAPSULE ORAL DAILY
Qty: 30 CAPSULE | Refills: 0 | Status: SHIPPED | OUTPATIENT
Start: 2024-03-05 | End: 2024-04-04

## 2024-03-04 RX ORDER — FERROUS SULFATE 325(65) MG
65 TABLET ORAL
Qty: 30 TABLET | Refills: 0 | Status: SHIPPED | OUTPATIENT
Start: 2024-03-05 | End: 2024-04-04

## 2024-03-04 RX ORDER — ATORVASTATIN CALCIUM 40 MG/1
40 TABLET, FILM COATED ORAL DAILY
Qty: 30 TABLET | Refills: 0 | Status: SHIPPED | OUTPATIENT
Start: 2024-03-04 | End: 2024-04-03

## 2024-03-04 RX ORDER — ASCORBIC ACID 500 MG
500 TABLET ORAL DAILY
Qty: 30 TABLET | Refills: 0 | Status: SHIPPED | OUTPATIENT
Start: 2024-03-04 | End: 2024-04-03

## 2024-03-04 RX ORDER — ALBUTEROL SULFATE 90 UG/1
2 AEROSOL, METERED RESPIRATORY (INHALATION) EVERY 6 HOURS PRN
Qty: 18 G | Refills: 11 | Status: SHIPPED | OUTPATIENT
Start: 2024-03-04 | End: 2024-04-03

## 2024-03-04 RX ORDER — ASPIRIN 81 MG/1
81 TABLET ORAL DAILY
Qty: 30 TABLET | Refills: 0 | Status: SHIPPED | OUTPATIENT
Start: 2024-03-04 | End: 2024-04-03

## 2024-03-04 RX ADMIN — OXYCODONE HYDROCHLORIDE AND ACETAMINOPHEN 1000 MG: 500 TABLET ORAL at 08:03

## 2024-03-04 RX ADMIN — DIPHENHYDRAMINE HYDROCHLORIDE 25 MG: 25 CAPSULE ORAL at 03:38

## 2024-03-04 RX ADMIN — FERROUS SULFATE TAB 325 MG (65 MG ELEMENTAL FE) 1 TABLET: 325 (65 FE) TAB at 08:03

## 2024-03-04 RX ADMIN — SPIRONOLACTONE 25 MG: 25 TABLET, FILM COATED ORAL at 08:03

## 2024-03-04 RX ADMIN — METHADONE HYDROCHLORIDE 20 MG: 10 TABLET ORAL at 06:12

## 2024-03-04 RX ADMIN — ASPIRIN 81 MG: 81 TABLET, COATED ORAL at 08:03

## 2024-03-04 RX ADMIN — PANTOPRAZOLE SODIUM 20 MG: 20 TABLET, DELAYED RELEASE ORAL at 06:12

## 2024-03-04 RX ADMIN — LISINOPRIL 5 MG: 5 TABLET ORAL at 08:03

## 2024-03-04 RX ADMIN — ACETAMINOPHEN 975 MG: 325 TABLET ORAL at 01:04

## 2024-03-04 RX ADMIN — METOPROLOL SUCCINATE 25 MG: 25 TABLET, EXTENDED RELEASE ORAL at 08:03

## 2024-03-04 RX ADMIN — TAMSULOSIN HYDROCHLORIDE 0.4 MG: 0.4 CAPSULE ORAL at 08:03

## 2024-03-04 ASSESSMENT — COGNITIVE AND FUNCTIONAL STATUS - GENERAL
DAILY ACTIVITIY SCORE: 24
MOBILITY SCORE: 24

## 2024-03-04 ASSESSMENT — PAIN SCALES - GENERAL: PAINLEVEL_OUTOF10: 6

## 2024-03-04 NOTE — PROGRESS NOTES
"Marco Antonio Montalvo is a 73 y.o. male on day 10 of admission presenting with Acute on chronic systolic (congestive) heart failure (CMS/HCC).    Subjective   NAEO. When seen in the morning  the patient reported he feels better, his shortness of breath improved and is mood is the best since admission according to him. He has a waxing and waning abdominal pain that is non specific ( right lower flank, left upper quadrant mainly) the pain is about the same as usual he mentioned, with some bloating.       Objective   Physical Exam  Constitutional: NAD  HEENT: EOMI, no scleral icterus, MMM  CV: regular, no m/r/g  Pulm: diminished in bilateral bases, no increased WOB, no longer short of breath while speaking  GI: Soft, nontender, non-distended  MSK: mild tenderness over left flank  Extremities: significant BL LE pitting edema, firm- painful to touch  Skin: warm  Neuro: grossly alert and oriented    Last Recorded Vitals  Blood pressure 112/66, pulse 58, temperature 36.6 °C (97.9 °F), resp. rate 16, height 1.778 m (5' 10\"), weight 98.6 kg (217 lb 6 oz), SpO2 94 %.  Intake/Output last 3 Shifts:  I/O last 3 completed shifts:  In: 480 (4.9 mL/kg) [P.O.:480]  Out: 1200 (12.2 mL/kg) [Urine:1200 (0.3 mL/kg/hr)]  Weight: 98.6 kg     Scheduled medications  ascorbic acid, 1,000 mg, oral, Daily  aspirin, 81 mg, oral, Daily  atorvastatin, 40 mg, oral, Daily  ferrous sulfate (325 mg ferrous sulfate), 65 mg of iron, oral, Daily with breakfast  lidocaine, 2 patch, transdermal, Once  lisinopril, 5 mg, oral, Daily  methadone, 20 mg, oral, q12h  [Held by provider] metOLazone, 5 mg, oral, Daily  metoprolol succinate XL, 25 mg, oral, Daily  pantoprazole, 20 mg, oral, Daily before breakfast  [Held by provider] QUEtiapine, 12.5 mg, oral, BID  rivaroxaban, 20 mg, oral, Daily with evening meal  spironolactone, 25 mg, oral, Daily  tamsulosin, 0.4 mg, oral, Daily    Continuous medications     PRN medications  PRN medications: acetaminophen, albuterol, " alum-mag hydroxide-simeth, diclofenac sodium, nicotine    Results for orders placed or performed during the hospital encounter of 02/23/24 (from the past 24 hour(s))   Renal function panel   Result Value Ref Range    Glucose 120 (H) 74 - 99 mg/dL    Sodium 135 (L) 136 - 145 mmol/L    Potassium 4.8 3.5 - 5.3 mmol/L    Chloride 104 98 - 107 mmol/L    Bicarbonate 23 21 - 32 mmol/L    Anion Gap 13 10 - 20 mmol/L    Urea Nitrogen 58 (H) 6 - 23 mg/dL    Creatinine 1.59 (H) 0.50 - 1.30 mg/dL    eGFR 46 (L) >60 mL/min/1.73m*2    Calcium 8.5 (L) 8.6 - 10.6 mg/dL    Phosphorus 4.1 2.5 - 4.9 mg/dL    Albumin 3.9 3.4 - 5.0 g/dL   Magnesium   Result Value Ref Range    Magnesium 2.00 1.60 - 2.40 mg/dL   CBC   Result Value Ref Range    WBC 4.8 4.4 - 11.3 x10*3/uL    nRBC 0.0 0.0 - 0.0 /100 WBCs    RBC 3.89 (L) 4.50 - 5.90 x10*6/uL    Hemoglobin 9.6 (L) 13.5 - 17.5 g/dL    Hematocrit 33.2 (L) 41.0 - 52.0 %    MCV 85 80 - 100 fL    MCH 24.7 (L) 26.0 - 34.0 pg    MCHC 28.9 (L) 32.0 - 36.0 g/dL    RDW 16.5 (H) 11.5 - 14.5 %    Platelets 218 150 - 450 x10*3/uL       Assessment/Plan   Principal Problem:    Acute on chronic systolic (congestive) heart failure (CMS/HCC)  Active Problems:    Acute on chronic systolic congestive heart failure (CMS/HCC)    ASSESSMENT:  Marco Antonio Montalvo is a 73 y.o. male PMH HFrEF (EF 48% in 11/23), CAD s/p PCI in 2001 and CABG x3 (LIMA-LAD, SVG-PDA, SVG-D2) 11/2023, A-fib RVR on xaretlo, cholelithiasis, nephrolithiasis c/b pyelonephritis, PTSD, depression, chronic pain and GERD who presents today with several days of worsening shortness of breath and abdominal pain. Worsening HUNTER, LE edema and orthopnea in setting of elevated BNP and vascular congestion on imaging. He was diuresed for suspected acute decompensation heart failure iso medication noncompliance.     Updates:  - UOP 1350L last 24h  - ROSALBA improved, the patient is planned for discharge today on lasix 40 mg daily.   - Simethicone for gas  - stopped  seroquel due to lethargy and fatigued  - PCP and HF follow[facility]up [facility]     #Acute on Chronic Systolic HF (EF 48%, 11/23) s/t medication noncompliance  #CAD s/p CABG (11/23)  :: TTE 11/23 w/ EF 48% post-CABG  :: dry weight ~190lbs, admission weight 210 lbs.  :: trop negative, BNP elevated at 219 (2000s at OSH)  :: supposed to be on torsemide 20 mg daily at home, metoprolol succinate ER 25 mg daily, lisinopril 5 daily  :: CXR w/ cardiomegaly and vascular congestion  :: CTPE w/ did note dilated main pulmonary artery, mild left ventricular and left atrial enlargement, a multiple prominent to mildly enlarged mediastinal lymph nodes, and patchy mosaic attenuation within bilateral lungs, which may represent a component of small vessel/small airway disease.  :: PA Systolic pressure 33 in 11/23 on PA cath, though in setting of diuresis  :: PFTs 11/23 with normal FEV1 and FVC but reduced ratio suggestive of mild obstructive pattern or normal variant. Significant bronchodilator response. Normal DLCO.  :: TTE w/ reduced EF 35-40% with global hypokinesis from 48% 11/23, likely s/t medication noncompliance  Plan  -Preload: - was on metolazone 5mg, lasix 80 IV, in hospital. Switched to PO lasix 40 mg at DC.  -Afterload: lisinopril 5 mg  -NMB: spironolactone 25 mg, metoprolol succinate 25 mg  -SGLT2i: limited by affordability  -continue home atorvastatin 40 mg  -RHC cancelled due to patient refusing for second consecutive day, will medically manage  -strict I&O's, daily weights     #Nonoliguric ROSALBA  :: Cr 0.91 on admission -> on 3/3 2.11 > 3/4 1.5, improving on holding diuresis for a day, we will discharge the patient on lasix 40 mg daily, with PCP follow up for Creatinine and volume status.     #A fib w/ bradycardia  :: home meds- metoprolol succinate, xarelto 20 mg daily. Has not been taking.  -restarted metoprolol, continue xarelto  -CTM, if concern for sxs bradycardia may need to discuss  PPM    #Abdominal Pain  #Left Flank pain  #Non-obstructing nephrolithiasis  :: CT A/P No evidence of acute pathology within the abdomen and pelvis. Incidental findings of atrophy of the left hepatic lobe, chronic pancreatitis, and nonobstructing bilateral nephrolithiasis as described above  :: UA bland, LFTs wnl  :: suspect may be s/t constipation as patient states he not regular. Low suspicion for acute pancreatitis flare. Could also be component acid reflux  vs PUD as improves with PPI  -continue PPI  - needs outpatient evaluation  -renal ultrasound ordered- no hydro  -Simethicone for complaints of gas     #Depression  #PTSD  #Social  :: not taking home zoloft 100 mg daily  -psych consulted, now signed off. Impression was PTSD and Group B personality traits  -started seroquel 12.5 mg BID, stopped mirtazapine given lasting sedation   - stopped Seroquel  due to pt being sedated and less responsive     #Thoracic Aortic Aneurysm  :: CT PE w/ aneurysmal dilatation ascending thoracic aorta measuring up to 4.5 cm  -follow-up CT/MR angiogram of the chest in 6-12 months is recommended to ensure  stability.     #SHON  :: hgb 9.3, hgb 8-9 since 11/17/23 w/ acute drop from ~14. At that time attributed to blood loss from procedure. No reported melena or BRBPR.   :: Iron 27, UIBC 394, TIBC 421, % sat 6, ferritin 6  -start iron supplement  -daily cbc     #Chronic pain  -continue methadone 20 mg q12     #tobacco use disorder  -nicotine patches     F: diuresing  E: K>4, Mg>2  N: Na restricted  A: PIV  GI ppx: home PPI  DVT ppx: home xarelto  NOK: Daughter Kelsey [phone]  Code Status: Full code    Hailey Benitez MD

## 2024-03-04 NOTE — DISCHARGE SUMMARY
Discharge Diagnosis  Acute on chronic systolic (congestive) heart failure (CMS/[facility])    Issues Requiring Follow-Up      Test Results Pending At Discharge  Pending Labs       No current pending labs.            Hospital Course  Marco Antonio Montalvo is a 73 y.o. male PMH HFmrEF (EF 48% in 11/23), CAD s/p PCI in 2001 and CABG x3 (LIMA-LAD, SVG-PDA, SVG-D2) 11/2023, A-fib RVR on xaretlo, cholelithiasis, nephrolithiasis c/b pyelonephritis, PTSD, depression, chronic pain and GERD who presents today with several days of worsening SOB, orthopnea, LE swelling. Weight has also gone up. Reports dry weight ~190s, 220s on admission. Patient initially presented to [facility] in [place] for these concerns, was being diuresed, but left AMA. BNP 2, 189, trop 24, procal 0.03. CXR cardiomegaly w/ mild pulmonary vascular congestion. He was discharged with script for lasix (7 days of 20 mg), 5 of which he took day prior to presentation. Daughter usually manages his medications, but she has been unavailable to help him, so he has not taken any medications aside from the lasix for several days.   HDS and afebrile in ED, O2 sat 96% on RA. . CTPE w/o acute PE. Diuresed aggressively for ADHF iso medication noncompliance with metolazone 5mg and lasix 80mg IV w/ improvement in SOB and LE edema. He also endorsed diffuse abdominal pain on admission, so CT A/P completed w/o acute abdominal findings, incidentally noted chronic pancreatitis and non-obstructing bilateral nephrolithiasis. Patient persistently fixated on kidney stones as cause of edema and complaining of left flank pain, so renal US ordered.    Patient was seen by psych for anxiety, PTSD. Impression was PTSD and Cluster B personality traits. He was started on seroquel 12.5 BID.    To Follow-Up:    PCP- needs repeat CT/MR angiogram of chest for monitoring thoracic aortic aneurysm. in 6-12 months  Thoracic Aortic Aneurysm    Follow up cr    Hf follow upPertinent Physical  Exam At Time of Discharge  Physical Exam    Home Medications     Medication List      START taking these medications     alum-mag hydroxide-simeth 200-200-20 mg/5 mL oral suspension; Commonly   known as: Mylanta; Take 10 mL by mouth 4 times a day as needed for   indigestion or heartburn.   ferrous sulfate (325 mg ferrous sulfate) tablet; Take 1 tablet by mouth   once daily with breakfast. Do not start before March 5, 2024.; Start   taking on: March 5, 2024   lidocaine 4 % patch; Place 2 patches over 12 hours on the skin 1 time   for 1 dose. Remove & discard patch within 12 hours or as directed by MD.   pantoprazole 20 mg EC tablet; Commonly known as: ProtoNix; Take 1 tablet   (20 mg) by mouth once daily in the morning. Take before meals. Do not   crush, chew, or split. Do not start before March 5, 2024.; Start taking   on: March 5, 2024; Replaces: omeprazole 20 mg DR capsule   spironolactone 25 mg tablet; Commonly known as: Aldactone; Take 1 tablet   (25 mg) by mouth once daily. Do not start before March 5, 2024.; Start   taking on: March 5, 2024   tamsulosin 0.4 mg 24 hr capsule; Commonly known as: Flomax; Take 1   capsule (0.4 mg) by mouth once daily. Do not start before March 5, 2024.;   Start taking on: March 5, 2024     CONTINUE taking these medications     albuterol 90 mcg/actuation inhaler; Inhale 2 puffs every 6 hours if   needed for shortness of breath.   ascorbic acid 500 mg tablet; Commonly known as: Vitamin C; Take 1 tablet   (500 mg) by mouth once daily.   aspirin 81 mg EC tablet; Take 1 tablet (81 mg) by mouth once daily.   atorvastatin 40 mg tablet; Commonly known as: Lipitor; Take 1 tablet (40   mg) by mouth once daily.   calcium carbonate-vitamin D3 500 mg-5 mcg (200 unit) tablet; Take 1   tablet by mouth once daily.   cholecalciferol 25 MCG (1000 UT) capsule; Commonly known as: Vitamin   D-3; Take 1 capsule (25 mcg) by mouth once daily.   lisinopril 5 mg tablet; Take 1 tablet (5 mg) by mouth once  daily.   methadone 10 mg tablet; Commonly known as: Dolophine   metoprolol succinate XL 25 mg 24 hr tablet; Commonly known as:   Toprol-XL; Take 1 tablet (25 mg) by mouth once daily.   rivaroxaban 20 mg tablet; Commonly known as: Xarelto; Take 1 tablet (20   mg) by mouth once daily in the evening. Take with meals.   Spiriva Respimat 2.5 mcg/actuation inhaler; Generic drug: tiotropium;   Inhale 2 puffs once daily.   torsemide 20 mg tablet; Commonly known as: Demadex; Take 1 tablet (20   mg) by mouth once daily.     STOP taking these medications     furosemide 20 mg tablet; Commonly known as: Lasix   omeprazole 20 mg DR capsule; Commonly known as: PriLOSEC; Replaced by:   pantoprazole 20 mg EC tablet       Outpatient Follow-Up  No future appointments.    Kelechi Layton MD

## 2024-03-04 NOTE — NURSING NOTE
The patient is sitting on the side of the bed nodding off and was asked multiple times to lay down to prevent a fall. The patient is refusing and continues to stay in that position.

## 2024-03-05 NOTE — DISCHARGE SUMMARY
Discharge Diagnosis  Acute on chronic systolic (congestive) heart failure (CMS/HCC)    Issues Requiring Follow-Up  PCP- Creatinine Trend and volume statues.  PCP- needs repeat CT/MR angiogram of chest for monitoring thoracic aortic aneurysm. in 6-12 months  PCP - continue Evaluation of intermittent non specific abdominal pain  - Urology Referral    - HF follow up    Test Results Pending At Discharge  Pending Labs       No current pending labs.            Hospital Course  Marco Antonio Montalvo is a 73 y.o. male PMH HFmrEF (EF 48% in 11/23), CAD s/p PCI in 2001 and CABG x3 (LIMA-LAD, SVG-PDA, SVG-D2) 11/2023, A-fib RVR on xaretlo, cholelithiasis, nephrolithiasis c/b pyelonephritis, PTSD, depression, chronic pain and GERD who presents today with several days of worsening SOB, orthopnea, LE swelling. And increase in Weight. Reports dry weight ~190s, 220s on admission. Patient initially presented to [facility] in [place] for these concerns, was being diuresed, but left AMA. BNP 2,189, trop 24, procal 0.03. CXR cardiomegaly w/ mild pulmonary vascular congestion. He was discharged with script for lasix (7 days of 20 mg), 5 of which he took day prior to presentation. Daughter usually manages his medications, but she has been unavailable to help him, so he has not taken any medications aside from the lasix for several days.   HDS and afebrile in ED, O2 sat 96% on RA. . CTPE w/o acute PE. Diuresed aggressively for ADHF iso medication noncompliance with metolazone 5mg and lasix 80mg IV w/ improvement in SOB and LE edema. He also endorsed diffuse abdominal pain on admission, so CT A/P completed w/o acute abdominal findings, incidentally noted chronic pancreatitis and non-obstructing bilateral nephrolithiasis. Patient persistently fixated on kidney stones as cause of edema and complaining of left flank pain, so renal US ordered, which showed  0.7 cm nonobstructive calculus within the midpole of the left  kidney.  Otherwise, no hydronephrosis bilaterally. UA bland, LFTs wnl, suspect may be s/t constipation as patient states he not regular. Low suspicion for acute pancreatitis flare. Could also be component acid reflux  vs PUD as improves with PPI, plan for further workup as an outpatient.  Patient was seen by psych for anxiety, PTSD. Impression was PTSD and Cluster B personality traits. He was started on seroquel 12.5 BID, but stopped seroquel due to the patient becoming sedated.   The patient had an increase in Cr on on 3/3 2.11 >3/4 1.5, improving on holding diuresis for a day, we discharged the patient on torsemide 20 mg daily, with PCP follow up for Creatinine and volume status.  The patient was discharge after improvement in Shortness of breath and LL swelling.   The patient is a [employer] and has benefits, [facility]     To Follow-Up:  PCP- Creatinine Trend and volume statues.  PCP- needs repeat CT/MR angiogram of chest for monitoring thoracic aortic aneurysm. in 6-12 months  PCP - continue Evaluation of intermittent non specific abdominal pain  - Urology Referral    - HF follow up    Pertinent Physical Exam At Time of Discharge  Physical Exam  Constitutional:       Appearance: Normal appearance.   HENT:      Nose: Nose normal.      Mouth/Throat:      Mouth: Mucous membranes are moist.      Pharynx: Oropharynx is clear.   Eyes:      Extraocular Movements: Extraocular movements intact.      Conjunctiva/sclera: Conjunctivae normal.   Cardiovascular:      Rate and Rhythm: Normal rate.   Pulmonary:      Effort: Pulmonary effort is normal.      Breath sounds: Normal breath sounds.   Abdominal:      General: Abdomen is flat.      Palpations: Abdomen is soft.   Musculoskeletal:      Cervical back: Normal range of motion and neck supple.   Neurological:      General: No focal deficit present.      Mental Status: He is alert and  oriented to person, place, and time. Mental status is at baseline.         Home Medications     Medication List      START taking these medications     alum-mag hydroxide-simeth 200-200-20 mg/5 mL oral suspension; Commonly   known as: Mylanta; Take 10 mL by mouth 4 times a day as needed for   indigestion or heartburn.   ferrous sulfate (325 mg ferrous sulfate) tablet; Take 1 tablet by mouth   once daily with breakfast. Do not start before March 5, 2024.; Start   taking on: March 5, 2024   lidocaine 4 % patch; Place 2 patches over 12 hours on the skin 1 time   for 1 dose. Remove & discard patch within 12 hours or as directed by MD.   pantoprazole 20 mg EC tablet; Commonly known as: ProtoNix; Take 1 tablet   (20 mg) by mouth once daily in the morning. Take before meals. Do not   crush, chew, or split. Do not start before March 5, 2024.; Start taking   on: March 5, 2024; Replaces: omeprazole 20 mg DR capsule   spironolactone 25 mg tablet; Commonly known as: Aldactone; Take 1 tablet   (25 mg) by mouth once daily. Do not start before March 5, 2024.; Start   taking on: March 5, 2024   tamsulosin 0.4 mg 24 hr capsule; Commonly known as: Flomax; Take 1   capsule (0.4 mg) by mouth once daily. Do not start before March 5, 2024.;   Start taking on: March 5, 2024     CONTINUE taking these medications     albuterol 90 mcg/actuation inhaler; Inhale 2 puffs every 6 hours if   needed for shortness of breath.   ascorbic acid 500 mg tablet; Commonly known as: Vitamin C; Take 1 tablet   (500 mg) by mouth once daily.   aspirin 81 mg EC tablet; Take 1 tablet (81 mg) by mouth once daily.   atorvastatin 40 mg tablet; Commonly known as: Lipitor; Take 1 tablet (40   mg) by mouth once daily.   calcium carbonate-vitamin D3 500 mg-5 mcg (200 unit) tablet; Take 1   tablet by mouth once daily.   cholecalciferol 25 MCG (1000 UT) capsule; Commonly known as: Vitamin   D-3; Take 1 capsule (25 mcg) by mouth once daily.   lisinopril 5 mg tablet; Take  1 tablet (5 mg) by mouth once daily.   methadone 10 mg tablet; Commonly known as: Dolophine   metoprolol succinate XL 25 mg 24 hr tablet; Commonly known as:   Toprol-XL; Take 1 tablet (25 mg) by mouth once daily.   rivaroxaban 20 mg tablet; Commonly known as: Xarelto; Take 1 tablet (20   mg) by mouth once daily in the evening. Take with meals.   Spiriva Respimat 2.5 mcg/actuation inhaler; Generic drug: tiotropium;   Inhale 2 puffs once daily.   torsemide 20 mg tablet; Commonly known as: Demadex; Take 1 tablet (20   mg) by mouth once daily.     STOP taking these medications     furosemide 20 mg tablet; Commonly known as: Lasix   omeprazole 20 mg DR capsule; Commonly known as: PriLOSEC; Replaced by:   pantoprazole 20 mg EC tablet       Outpatient Follow-Up  No future appointments.    Hailey Benitez MD

## (undated) DEVICE — HOOD, PEEL-AWAY: Brand: FLYTE

## (undated) DEVICE — GLOVE SURG 7.5 LTX TRIFLEX WIDE FINGER PWDR

## (undated) DEVICE — SUTURE MCRYL SZ 3-0 L27IN ABSRB UD L26MM SH 1/2 CIR Y416H

## (undated) DEVICE — Device: Brand: POWER-FLO®

## (undated) DEVICE — HANDLE CVR PATENTED RETENTION DISC STRL LIGHT SHLD

## (undated) DEVICE — SUTURE ETHLN SZ 4-0 L18IN NONABSORBABLE BLK L19MM PS-2 3/8 1667H

## (undated) DEVICE — ENCORE® LATEX TEXTURED SIZE 7.5, STERILE LATEX POWDER-FREE SURGICAL GLOVE: Brand: ENCORE

## (undated) DEVICE — ZIMMER® STERILE DISPOSABLE TOURNIQUET CUFF WITH PLC, DUAL PORT, SINGLE BLADDER, 30 IN. (76 CM)

## (undated) DEVICE — SYRINGE, LUER LOCK, 30ML: Brand: MEDLINE

## (undated) DEVICE — TOTAL BASIC PK

## (undated) DEVICE — NEEDLE HYPO 18GA L1.5IN PNK POLYPR HUB S STL THN WALL FILL

## (undated) DEVICE — CHLORAPREP 26ML ORANGE

## (undated) DEVICE — GLOVE ORANGE PI 8 1/2   MSG9085

## (undated) DEVICE — SYSTEM SKIN CLSR 22CM DERMBND PRINEO

## (undated) DEVICE — STANDARD HYPODERMIC NEEDLE,POLYPROPYLENE HUB: Brand: MONOJECT

## (undated) DEVICE — SYRINGE BLB 50CC IRRIG PLIABLE FNGR FLNG GRAD FLSK DISP

## (undated) DEVICE — SPONGE LAP W18XL18IN WHT COT 4 PLY FLD STRUNG RADPQ DISP ST

## (undated) DEVICE — PADDING CAST W6INXL4YD NONSTERILE WHT SYN FBR NONABSORBENT

## (undated) DEVICE — Z CONVERTED USE 2275207 CLOTH PREP W7.5XL7.5IN 2% CHG SKIN ALC AND RNS FREE

## (undated) DEVICE — BLADE SURG SAW SAG S STL AGG 905MM LEN 185MM W 127MM THCK

## (undated) DEVICE — GAUZE,SPONGE,4"X4",16PLY,XRAY,STRL,LF: Brand: MEDLINE

## (undated) DEVICE — C-ARM: Brand: UNBRANDED

## (undated) DEVICE — DUAL CUT SAGITTAL BLADE

## (undated) DEVICE — ADHESIVE SKIN CLSR 0.7ML TOP DERMBND ADV

## (undated) DEVICE — PADDING CAST W4INXL4YD SPUN DACRON POLY POR SYN VERSATILE

## (undated) DEVICE — GAUZE,SPONGE,4"X4",16PLY,STRL,LF,10/TRAY: Brand: MEDLINE

## (undated) DEVICE — NEEDLE HYPO 25GA L1.5IN BLU POLYPR HUB S STL REG BVL STR

## (undated) DEVICE — APPLICATOR MEDICATED 26 CC SOLUTION HI LT ORNG CHLORAPREP

## (undated) DEVICE — SUTURE VCRL + SZ 2-0 L18IN ABSRB UD CT1 L36MM 1/2 CIR VCP839D

## (undated) DEVICE — HANDPIECE SET WITH HIGH FLOW TIP AND SUCTION TUBE: Brand: INTERPULSE

## (undated) DEVICE — FAIRFIELD KNEE LF

## (undated) DEVICE — DEVICE POS W4INXL5YD KNEE SURG FOAM PD SELF ADH WRP DEMAYO

## (undated) DEVICE — GENESIS TROCHLEAR PIN 1/8 X 3
Type: IMPLANTABLE DEVICE | Site: KNEE | Status: NON-FUNCTIONAL
Brand: GENESIS
Removed: 2022-06-29

## (undated) DEVICE — SOLUTION IRRIG 3000ML 0.9% SOD CHL USP UROMATIC PLAS CONT

## (undated) DEVICE — BANDAGE,GAUZE,BULKEE II,4.5"X4.1YD,STRL: Brand: MEDLINE

## (undated) DEVICE — PRECISION THIN (9.0 X 0.38 X 25.0MM)

## (undated) DEVICE — 450 ML BOTTLE OF 0.05% CHLORHEXIDINE GLUCONATE IN 99.95% STERILE WATER FOR IRRIGATION, USP AND APPLICATOR.: Brand: IRRISEPT ANTIMICROBIAL WOUND LAVAGE

## (undated) DEVICE — PEN: MARKING STD 100/CS: Brand: MEDICAL ACTION INDUSTRIES

## (undated) DEVICE — STERILE TOTAL KNEE DRAPE PACK: Brand: CARDINAL HEALTH

## (undated) DEVICE — INTENDED FOR TISSUE SEPARATION, AND OTHER PROCEDURES THAT REQUIRE A SHARP SURGICAL BLADE TO PUNCTURE OR CUT.: Brand: BARD-PARKER ® STAINLESS STEEL BLADES

## (undated) DEVICE — DRESSING CNTCT 4X12IN IS THERABOND 3D

## (undated) DEVICE — HOOD: Brand: FLYTE

## (undated) DEVICE — SUTURE STRATAFIX SZ 1 L14IN ABSRB VLT L36MM MO-4 TAPERPOINT SXPD2B400

## (undated) DEVICE — SUTURE VCRL SZ 2-0 L27IN ABSRB UD L26MM CT-2 1/2 CIR J269H

## (undated) DEVICE — Device

## (undated) DEVICE — YANKAUER,BULB TIP,W/O VENT,RIGID,STERILE: Brand: MEDLINE

## (undated) DEVICE — SHEET,DRAPE,53X77,STERILE: Brand: MEDLINE

## (undated) DEVICE — DECANTER FLD 9IN ST BG FOR ASEP TRNSF OF FLD

## (undated) DEVICE — MEDI-VAC NON-CONDUCTIVE SUCTION TUBING: Brand: CARDINAL HEALTH

## (undated) DEVICE — SUTURE MCRYL + SZ 4-0 L27IN ABSRB UD L19MM PS-2 3/8 CIR MCP426H

## (undated) DEVICE — DRAPE 84X54IN RADIOLOGY C ARM KEYBOARD

## (undated) DEVICE — TOWEL OR BLUEE 16X26IN ST 8 PACK ORB08 16X26ORTWL

## (undated) DEVICE — DRESSING GZ XRFRM 4X4(25/BX 6BX/CS)

## (undated) DEVICE — SPLINT ORTH CLOSED PAT UNIV 20 IN BLK PROCARE QUICK-FIT

## (undated) DEVICE — 12 ML SYRINGE,LUER-LOCK TIP: Brand: MONOJECT

## (undated) DEVICE — BASIC PACK: Brand: CONVERTORS

## (undated) DEVICE — TIBURON EXTREMITY SHEET: Brand: CONVERTORS

## (undated) DEVICE — NAVIO FLAT MARKERS: Brand: NAVIO

## (undated) DEVICE — SUTURE VCRL + SZ 0 L18IN ABSRB UD L36MM CT-1 1/2 CIR VCP840D

## (undated) DEVICE — 1810 FOAM BLOCK NEEDLE COUNTER: Brand: DEVON

## (undated) DEVICE — DRAPE,U/ SHT,SPLIT,PLAS,STERIL: Brand: MEDLINE

## (undated) DEVICE — SOLUTION IV IRRIG POUR BRL 0.9% SODIUM CHL 2F7124

## (undated) DEVICE — SMALL TEAR RASP (10.3 X 5.0MM)

## (undated) DEVICE — GOWN,AURORA,NONREINF,RAGLAN,XXL,STERILE: Brand: MEDLINE

## (undated) DEVICE — 3 BONE CEMENT MIXER: Brand: MIXEVAC

## (undated) DEVICE — COMPONENT KNEE LOWER EXTREMITIES. ANCIL ZIRCONIUM NAVIO DISP